# Patient Record
Sex: FEMALE | Race: WHITE | NOT HISPANIC OR LATINO | Employment: FULL TIME | URBAN - NONMETROPOLITAN AREA
[De-identification: names, ages, dates, MRNs, and addresses within clinical notes are randomized per-mention and may not be internally consistent; named-entity substitution may affect disease eponyms.]

---

## 2020-04-26 ENCOUNTER — NURSE TRIAGE (OUTPATIENT)
Dept: CALL CENTER | Facility: HOSPITAL | Age: 40
End: 2020-04-26

## 2020-04-26 NOTE — TELEPHONE ENCOUNTER
"She tested positive for Covid 19- 16 days ago at [facility]. She is having sob, and lost of taste. She is having palpitations. She gets sob if get up talking on the phone and walking, she has to stop and set down. She got winded doing task like sweeping.Her lungs feels weird, she has a bad metal taste in mouth. She states she should of been off isolation Saturday, she does not feel right. Advised to cover face and mouth and go to the ED. Need to continue to isolate. She has not been on any medication for theses symptoms.     Reason for Disposition  • Patient sounds very sick or weak to the triager    Additional Information  • Negative: SEVERE difficulty breathing (e.g., struggling for each breath, speaks in single words)  • Negative: Difficult to awaken or acting confused (e.g., disoriented, slurred speech)  • Negative: Bluish (or gray) lips or face now  • Negative: Shock suspected (e.g., cold/pale/clammy skin, too weak to stand, low BP, rapid pulse)  • Negative: Sounds like a life-threatening emergency to the triager  • Negative: [1] COVID-19 suspected (e.g., cough, fever, shortness of breath) AND [2] public health department recommends testing  • Negative: [1] COVID-19 exposure AND [2] no symptoms  • Negative: COVID-19 and Breastfeeding, questions about  • Negative: SEVERE or constant chest pain (Exception: mild central chest pain, present only when coughing)  • Negative: MODERATE difficulty breathing (e.g., speaks in phrases, SOB even at rest, pulse 100-120)  • Negative: MILD difficulty breathing (e.g., minimal/no SOB at rest, SOB with walking, pulse <100)    Answer Assessment - Initial Assessment Questions  1. COVID-19 DIAGNOSIS: \"Who made your Coronavirus (COVID-19) diagnosis?\" \"Was it confirmed by a positive lab test?\" If not diagnosed by a HCP, ask \"Are there lots of cases (community spread) where you live?\" (See public health department website, if unsure)    * MAJOR community spread: high number of cases; " "numbers of cases are increasing; many people hospitalized.    * MINOR community spread: low number of cases; not increasing; few or no people hospitalized      She tested positive for Covid 19 16 days ago at the [facility].   2. ONSET: \"When did the COVID-19 symptoms start?\"       16 days ago- with fever, cough, and sob, lost of taste and smelll.   3. WORST SYMPTOM: \"What is your worst symptom?\" (e.g., cough, fever, shortness of breath, muscle aches)      Know she is sob, having palpitations, lungs feel strange, and has a bad taste. n  4. COUGH: \"How bad is the cough?\"        n  5. FEVER: \"Do you have a fever?\" If so, ask: \"What is your temperature, how was it measured, and when did it start?\"      n  6. RESPIRATORY STATUS: \"Describe your breathing?\" (e.g., shortness of breath, wheezing, unable to speak)       Sob with adl task, She has to stop and rest.   7. BETTER-SAME-WORSE: \"Are you getting better, staying the same or getting worse compared to yesterday?\"  If getting worse, ask, \"In what way?\"      Worse   8. HIGH RISK DISEASE: \"Do you have any chronic medical problems?\" (e.g., asthma, heart or lung disease, weak immune system, etc.)      n  9. PREGNANCY: \"Is there any chance you are pregnant?\" \"When was your last menstrual period?\"      n  10. OTHER SYMPTOMS: \"Do you have any other symptoms?\"  (e.g., runny nose, headache, sore throat, loss of smell)        Loss of taste.    Protocols used: CORONAVIRUS (COVID-19) DIAGNOSED OR SUSPECTED-ADULT-AH      "

## 2021-12-02 ENCOUNTER — OFFICE VISIT (OUTPATIENT)
Dept: OBSTETRICS AND GYNECOLOGY | Facility: CLINIC | Age: 41
End: 2021-12-02

## 2021-12-02 VITALS
BODY MASS INDEX: 23.55 KG/M2 | SYSTOLIC BLOOD PRESSURE: 132 MMHG | WEIGHT: 128 LBS | HEIGHT: 62 IN | DIASTOLIC BLOOD PRESSURE: 80 MMHG

## 2021-12-02 DIAGNOSIS — N63.0 BREAST LUMP: Primary | ICD-10-CM

## 2021-12-02 PROCEDURE — 99203 OFFICE O/P NEW LOW 30 MIN: CPT | Performed by: NURSE PRACTITIONER

## 2021-12-02 NOTE — PROGRESS NOTES
"Subjective     Aubree Alegria is a 41 y.o. female    Patient comes in today as a new patient.  She has complaints of a breast lump in her left arm.    Breast Mass  This is a new problem. The current episode started in the past 7 days. The problem occurs daily. The problem has been gradually worsening. Pertinent negatives include no abdominal pain, anorexia, arthralgias, change in bowel habit, chest pain, chills, congestion, coughing, diaphoresis, fatigue, fever, headaches, joint swelling, myalgias, nausea, neck pain, numbness, rash, sore throat, swollen glands, urinary symptoms, vertigo, visual change, vomiting or weakness. Nothing aggravates the symptoms. She has tried nothing for the symptoms.         /80   Ht 157.5 cm (62\")   Wt 58.1 kg (128 lb)   LMP 2021 (Approximate)   Breastfeeding No   BMI 23.41 kg/m²     No outpatient encounter medications on file as of 2021.     No facility-administered encounter medications on file as of 2021.       Surgical History  Past Surgical History:   Procedure Laterality Date   • APPENDECTOMY     • BREAST BIOPSY     •  SECTION      x2   • ECTOPIC PREGNANCY     • TUBAL ABDOMINAL LIGATION     • WISDOM TOOTH EXTRACTION         Family History  Family History   Problem Relation Age of Onset   • Kidney cancer Maternal Aunt    • Hypertension Father    • No Known Problems Mother    • No Known Problems Sister    • Leukemia Paternal Grandmother    • Breast cancer Paternal Aunt 40   • Ovarian cancer Neg Hx    • Uterine cancer Neg Hx    • Colon cancer Neg Hx    • Melanoma Neg Hx    • Prostate cancer Neg Hx        The following portions of the patient's history were reviewed and updated as appropriate: allergies, current medications, past family history, past medical history, past social history, past surgical history and problem list.    Review of Systems   Constitutional: Negative for activity change, appetite change, chills, diaphoresis, fatigue, fever, " unexpected weight gain and unexpected weight loss.   HENT: Negative for congestion, dental problem, drooling, ear discharge, ear pain, facial swelling, hearing loss, mouth sores, nosebleeds, postnasal drip, rhinorrhea, sinus pressure, sneezing, sore throat, swollen glands, tinnitus, trouble swallowing and voice change.    Eyes: Negative for blurred vision, double vision, photophobia, pain, discharge, redness, itching and visual disturbance.   Respiratory: Negative for apnea, cough, choking, chest tightness, shortness of breath, wheezing and stridor.    Cardiovascular: Negative for chest pain, palpitations and leg swelling.   Gastrointestinal: Negative for abdominal distention, abdominal pain, anal bleeding, anorexia, blood in stool, change in bowel habit, constipation, diarrhea, nausea, rectal pain, vomiting, GERD and indigestion.   Endocrine: Negative for cold intolerance, heat intolerance, polydipsia, polyphagia and polyuria.   Genitourinary: Positive for breast lump. Negative for amenorrhea, breast discharge, breast pain, decreased libido, decreased urine volume, difficulty urinating, dyspareunia, dysuria, flank pain, frequency, genital sores, hematuria, menstrual problem, pelvic pain, pelvic pressure, urgency, urinary incontinence, vaginal bleeding, vaginal discharge and vaginal pain.   Musculoskeletal: Negative for arthralgias, back pain, gait problem, joint swelling, myalgias, neck pain, neck stiffness and bursitis.   Skin: Negative for color change, dry skin and rash.   Allergic/Immunologic: Negative for environmental allergies, food allergies and immunocompromised state.   Neurological: Negative for dizziness, vertigo, tremors, seizures, syncope, facial asymmetry, speech difficulty, weakness, light-headedness, numbness, headache, memory problem and confusion.   Hematological: Negative for adenopathy. Does not bruise/bleed easily.   Psychiatric/Behavioral: Negative for agitation, behavioral problems,  decreased concentration, dysphoric mood, hallucinations, self-injury, sleep disturbance, suicidal ideas, negative for hyperactivity, depressed mood and stress. The patient is not nervous/anxious.        Objective   Physical Exam  Vitals and nursing note reviewed.   Constitutional:       Appearance: She is well-developed.   HENT:      Head: Normocephalic and atraumatic.   Neck:      Thyroid: No thyromegaly.   Chest:   Breasts: Breasts are symmetrical.      Right: No swelling, bleeding, inverted nipple, mass, nipple discharge, skin change, tenderness, axillary adenopathy or supraclavicular adenopathy.      Left: Mass and tenderness present. No swelling, bleeding, inverted nipple, nipple discharge, skin change, axillary adenopathy or supraclavicular adenopathy.         Musculoskeletal:      Cervical back: Normal range of motion and neck supple.   Lymphadenopathy:      Upper Body:      Right upper body: No supraclavicular, axillary or pectoral adenopathy.      Left upper body: No supraclavicular, axillary or pectoral adenopathy.   Skin:     General: Skin is warm and dry.   Neurological:      Mental Status: She is alert and oriented to person, place, and time.   Psychiatric:         Mood and Affect: Mood normal.         Behavior: Behavior normal.         Thought Content: Thought content normal.         Judgment: Judgment normal.         Assessment/Plan   Diagnoses and all orders for this visit:    1. Breast lump (Primary)  Comments:  Patient has an area in the left breast at approximately 1:00 that is very tender.  I feel a 1 cm firm area.  It has the feel of fibrocystic change.  She will have a mammogram and breast ultrasound at [facility].  She will return here for yearly checkup.  She is encouraged to decrease her caffeine intake.  Orders:  -     Mammo Diagnostic Digital Tomosynthesis Bilateral With CAD; Future  -     US Breast Left Limited; Future         Patient's Body mass index is 23.41 kg/m². indicating that she is  within normal range (BMI 18.5-24.9). No BMI management plan needed..      Michelle Larson, APRN  12/2/2021

## 2021-12-02 NOTE — PATIENT INSTRUCTIONS
"BMI for Adults  What is BMI?  Body mass index (BMI) is a number that is calculated from a person's weight and height. BMI can help estimate how much of a person's weight is composed of fat. BMI does not measure body fat directly. Rather, it is an alternative to procedures that directly measure body fat, which can be difficult and expensive.  BMI can help identify people who may be at higher risk for certain medical problems.  What are BMI measurements used for?  BMI is used as a screening tool to identify possible weight problems. It helps determine whether a person is obese, overweight, a healthy weight, or underweight.  BMI is useful for:  · Identifying a weight problem that may be related to a medical condition or may increase the risk for medical problems.  · Promoting changes, such as changes in diet and exercise, to help reach a healthy weight. BMI screening can be repeated to see if these changes are working.  How is BMI calculated?  BMI involves measuring your weight in relation to your height. Both height and weight are measured, and the BMI is calculated from those numbers. This can be done either in English ([place] or metric measurements. Note that charts and online BMI calculators are available to help you find your BMI quickly and easily without having to do these calculations yourself.  To calculate your BMI in English ([place] measurements:    1. Measure your weight in pounds (lb).  2. Multiply the number of pounds by 703.  ? For example, for a person who weighs 180 lb, multiply that number by 703, which equals 126,540.  3. Measure your height in inches. Then multiply that number by itself to get a measurement called \"inches squared.\"  ? For example, for a person who is 70 inches tall, the \"inches squared\" measurement is 70 inches x 70 inches, which equals 4,900 inches squared.  4. Divide the total from step 2 (number of lb x 703) by the total from step 3 (inches squared): 126,540 ÷ 4,900 = 25.8. This is " "your BMI.    To calculate your BMI in metric measurements:  1. Measure your weight in kilograms (kg).  2. Measure your height in meters (m). Then multiply that number by itself to get a measurement called \"meters squared.\"  ? For example, for a person who is 1.75 m tall, the \"meters squared\" measurement is 1.75 m x 1.75 m, which is equal to 3.1 meters squared.  3. Divide the number of kilograms (your weight) by the meters squared number. In this example: 70 ÷ 3.1 = 22.6. This is your BMI.  What do the results mean?  BMI charts are used to identify whether you are underweight, normal weight, overweight, or obese. The following guidelines will be used:  · Underweight: BMI less than 18.5.  · Normal weight: BMI between 18.5 and 24.9.  · Overweight: BMI between 25 and 29.9.  · Obese: BMI of 30 or above.  Keep these notes in mind:  · Weight includes both fat and muscle, so someone with a muscular build, such as an athlete, may have a BMI that is higher than 24.9. In cases like these, BMI is not an accurate measure of body fat.  · To determine if excess body fat is the cause of a BMI of 25 or higher, further assessments may need to be done by a health care provider.  · BMI is usually interpreted in the same way for men and women.  Where to find more information  For more information about BMI, including tools to quickly calculate your BMI, go to these websites:  · Centers for Disease Control and Prevention: www.cdc.gov  · American Heart Association: www.heart.org  · National Heart, Lung, and Blood [place]: www.nhlbi.nih.gov  Summary  · Body mass index (BMI) is a number that is calculated from a person's weight and height.  · BMI may help estimate how much of a person's weight is composed of fat. BMI can help identify those who may be at higher risk for certain medical problems.  · BMI can be measured using English measurements or metric measurements.  · BMI charts are used to identify whether you are underweight, normal " weight, overweight, or obese.  This information is not intended to replace advice given to you by your health care provider. Make sure you discuss any questions you have with your health care provider.  Document Revised: 09/09/2020 Document Reviewed: 07/17/2020  [employer]vier Patient Education © 2021 [employer]

## 2021-12-13 ENCOUNTER — HOSPITAL ENCOUNTER (OUTPATIENT)
Dept: MAMMOGRAPHY | Facility: HOSPITAL | Age: 41
Discharge: HOME | End: 2021-12-13

## 2021-12-13 ENCOUNTER — APPOINTMENT (OUTPATIENT)
Dept: ULTRASOUND IMAGING | Facility: HOSPITAL | Age: 41
End: 2021-12-13

## 2022-01-05 ENCOUNTER — HOSPITAL ENCOUNTER (OUTPATIENT)
Dept: MAMMOGRAPHY | Facility: HOSPITAL | Age: 42
Discharge: HOME | End: 2022-01-05

## 2022-01-05 ENCOUNTER — HOSPITAL ENCOUNTER (OUTPATIENT)
Dept: ULTRASOUND IMAGING | Facility: HOSPITAL | Age: 42
Discharge: HOME | End: 2022-01-05

## 2022-01-05 DIAGNOSIS — N63.0 BREAST LUMP: ICD-10-CM

## 2022-01-05 PROCEDURE — G0279 TOMOSYNTHESIS, MAMMO: HCPCS

## 2022-01-05 PROCEDURE — 76642 ULTRASOUND BREAST LIMITED: CPT

## 2022-01-05 PROCEDURE — 77066 DX MAMMO INCL CAD BI: CPT

## 2022-01-20 RX ORDER — NITROFURANTOIN 25; 75 MG/1; MG/1
100 CAPSULE ORAL 2 TIMES DAILY
Qty: 10 CAPSULE | Refills: 0 | Status: SHIPPED | OUTPATIENT
Start: 2022-01-20 | End: 2022-01-25

## 2022-01-25 ENCOUNTER — OFFICE VISIT (OUTPATIENT)
Dept: OBSTETRICS AND GYNECOLOGY | Facility: CLINIC | Age: 42
End: 2022-01-25

## 2022-01-25 VITALS
BODY MASS INDEX: 23.55 KG/M2 | DIASTOLIC BLOOD PRESSURE: 82 MMHG | HEIGHT: 62 IN | SYSTOLIC BLOOD PRESSURE: 124 MMHG | WEIGHT: 128 LBS

## 2022-01-25 DIAGNOSIS — Z12.31 ENCOUNTER FOR SCREENING MAMMOGRAM FOR BREAST CANCER: ICD-10-CM

## 2022-01-25 DIAGNOSIS — Z80.3 FAMILY HISTORY OF BREAST CANCER: ICD-10-CM

## 2022-01-25 DIAGNOSIS — N89.8 VAGINAL ITCHING: ICD-10-CM

## 2022-01-25 DIAGNOSIS — Z92.29 COVID-19 VACCINE SERIES COMPLETED: ICD-10-CM

## 2022-01-25 DIAGNOSIS — E55.9 VITAMIN D DEFICIENCY: ICD-10-CM

## 2022-01-25 DIAGNOSIS — N92.0 MENORRHAGIA WITH REGULAR CYCLE: ICD-10-CM

## 2022-01-25 DIAGNOSIS — N89.8 VAGINAL DISCHARGE: ICD-10-CM

## 2022-01-25 DIAGNOSIS — Z01.419 WELL WOMAN EXAM WITH ROUTINE GYNECOLOGICAL EXAM: Primary | ICD-10-CM

## 2022-01-25 PROCEDURE — 87563 M. GENITALIUM AMP PROBE: CPT | Performed by: NURSE PRACTITIONER

## 2022-01-25 PROCEDURE — 87512 GARDNER VAG DNA QUANT: CPT | Performed by: NURSE PRACTITIONER

## 2022-01-25 PROCEDURE — G0123 SCREEN CERV/VAG THIN LAYER: HCPCS | Performed by: NURSE PRACTITIONER

## 2022-01-25 PROCEDURE — 87481 CANDIDA DNA AMP PROBE: CPT | Performed by: NURSE PRACTITIONER

## 2022-01-25 PROCEDURE — 87661 TRICHOMONAS VAGINALIS AMPLIF: CPT | Performed by: NURSE PRACTITIONER

## 2022-01-25 PROCEDURE — 99396 PREV VISIT EST AGE 40-64: CPT | Performed by: NURSE PRACTITIONER

## 2022-01-25 PROCEDURE — 99213 OFFICE O/P EST LOW 20 MIN: CPT | Performed by: NURSE PRACTITIONER

## 2022-01-25 PROCEDURE — 87798 DETECT AGENT NOS DNA AMP: CPT | Performed by: NURSE PRACTITIONER

## 2022-01-25 PROCEDURE — 87624 HPV HI-RISK TYP POOLED RSLT: CPT | Performed by: NURSE PRACTITIONER

## 2022-01-25 NOTE — PATIENT INSTRUCTIONS
Health Maintenance, Female  Adopting a healthy lifestyle and getting preventive care are important in promoting health and wellness. Ask your health care provider about:  · The right schedule for you to have regular tests and exams.  · Things you can do on your own to prevent diseases and keep yourself healthy.  What should I know about diet, weight, and exercise?  Eat a healthy diet    · Eat a diet that includes plenty of vegetables, fruits, low-fat dairy products, and lean protein.  · Do not eat a lot of foods that are high in solid fats, added sugars, or sodium.    Maintain a healthy weight  Body mass index (BMI) is used to identify weight problems. It estimates body fat based on height and weight. Your health care provider can help determine your BMI and help you achieve or maintain a healthy weight.  Get regular exercise  Get regular exercise. This is one of the most important things you can do for your health. Most adults should:  · Exercise for at least 150 minutes each week. The exercise should increase your heart rate and make you sweat (moderate-intensity exercise).  · Do strengthening exercises at least twice a week. This is in addition to the moderate-intensity exercise.  · Spend less time sitting. Even light physical activity can be beneficial.  · Start  Vitamin D 5000IU per day. Vitamin D has been shown to improve your mood, help with fatigue and increase your immune system. A normal Vitamin D in women should be 50-70.  You should have your Vitamin D checked yearly  Watch cholesterol and blood lipids  Have your blood tested for lipids and cholesterol at 20 years of age, then have this test every 3 years.  Have your cholesterol levels checked more often if:  · Your lipid or cholesterol levels are high.  · You are older than 30 years of age.  · You are at high risk for heart disease.  What should I know about cancer screening?  Depending on your health history and family history, you may need to have  cancer screening at various ages. This may include screening for:  · Breast cancer.  · Cervical cancer.  · Colorectal cancer.  · Skin cancer.  · Lung cancer.  What should I know about heart disease, diabetes, and high blood pressure?  Blood pressure and heart disease  1. High blood pressure causes heart disease and increases the risk of stroke. This is more likely to develop in people who have high blood pressure readings, are of  descent, or are overweight.  2. Have your blood pressure checked:                  Every year.  Diabetes  Have regular diabetes screenings. This checks your fasting blood sugar level. Have the screening done:  · Once every year after age 30 if you are at a normal weight and have a low risk for diabetes.  · More often and at a younger age if you are overweight or have a high risk for diabetes.  What should I know about preventing infection?  Hepatitis B  If you have a higher risk for hepatitis B, you should be screened for this virus. Talk with your health care provider to find out if you are at risk for hepatitis B infection.  Hepatitis C  Testing is recommended for:  · Everyone born from 1945 through 1965.  · Anyone with known risk factors for hepatitis C.  Sexually transmitted infections (STIs)  1. Get screened for STIs, including gonorrhea and chlamydia, if:  ? You are sexually active and are younger than 24 years of age.  ? You are older than 24 years of age and your health care provider tells you that you are at risk for this type of infection.  ? Your sexual activity has changed since you were last screened, and you are at increased risk for chlamydia or gonorrhea. Ask your health care provider if you are at risk.  2. Ask your health care provider about whether you are at high risk for HIV. Your health care provider may recommend a prescription medicine to help prevent HIV infection. If you choose to take medicine to prevent HIV, you should first get tested for HIV. You should  then be tested every 3 months for as long as you are taking the medicine.  Pregnancy  · If you are about to stop having your period (premenopausal) and you may become pregnant, seek counseling before you get pregnant.  · Take 400 to 800 micrograms (mcg) of folic acid every day if you become pregnant.  · Ask for birth control (contraception) if you want to prevent pregnancy.  Osteoporosis and menopause  Osteoporosis is a disease in which the bones lose minerals and strength with aging. This can result in bone fractures. If you are 55 years old or older, or if you are at risk for osteoporosis and fractures, ask your health care provider if you should:  · Be screened for bone loss.  · Take a calcium or vitamin D supplement to lower your risk of fractures.  · Be given hormone replacement therapy (HRT) to treat symptoms of menopause.  Follow these instructions at home:  Lifestyle  · Do not use any products that contain nicotine or tobacco, such as cigarettes, e-cigarettes, and chewing tobacco. If you need help quitting, ask your health care provider.  · Do not use street drugs.  · Do not share needles.  · Ask your health care provider for help if you need support or information about quitting drugs.  Alcohol use  1. Do not drink alcohol if:  ? Your health care provider tells you not to drink.  ? You are pregnant, may be pregnant, or are planning to become pregnant.  2. If you drink alcohol:  ? Limit how much you use to 0-1 drink a day.  ? Limit intake if you are breastfeeding.  3. Be aware of how much alcohol is in your drink. In the [place] one drink equals one 12 oz bottle of beer (355 mL), one 5 oz glass of wine (148 mL), or one 1½ oz glass of hard liquor (44 mL).  General instructions  1. Schedule regular health, dental, and eye exams.  2. Stay current with your vaccines.  3. Tell your health care provider if:  ? You often feel depressed.  ? You have ever been abused or do not feel safe at home.  Summary  · Adopting a  healthy lifestyle and getting preventive care are important in promoting health and wellness.  · Follow your health care provider's instructions about healthy diet, exercising, and getting tested or screened for diseases.  · Follow your health care provider's instructions on monitoring your cholesterol and blood pressure.  This information is not intended to replace advice given to you by your health care provider. Make sure you discuss any questions you have with your health care provider.  Document Revised: 12/11/2019 Document Reviewed: 12/11/2019  [employer] Patient Education © 2021 [employer]      BMI for Adults  What is BMI?  Body mass index (BMI) is a number that is calculated from a person's weight and height. BMI can help estimate how much of a person's weight is composed of fat. BMI does not measure body fat directly. Rather, it is an alternative to procedures that directly measure body fat, which can be difficult and expensive.  BMI can help identify people who may be at higher risk for certain medical problems.  What are BMI measurements used for?  BMI is used as a screening tool to identify possible weight problems. It helps determine whether a person is obese, overweight, a healthy weight, or underweight.  BMI is useful for:  · Identifying a weight problem that may be related to a medical condition or may increase the risk for medical problems.  · Promoting changes, such as changes in diet and exercise, to help reach a healthy weight. BMI screening can be repeated to see if these changes are working.  How is BMI calculated?  BMI involves measuring your weight in relation to your height. Both height and weight are measured, and the BMI is calculated from those numbers. This can be done either in English ([place] or metric measurements. Note that charts and online BMI calculators are available to help you find your BMI quickly and easily without having to do these calculations yourself.  To calculate your  "BMI in English ([place] measurements:    1. Measure your weight in pounds (lb).  2. Multiply the number of pounds by 703.  ? For example, for a person who weighs 180 lb, multiply that number by 703, which equals 126,540.  3. Measure your height in inches. Then multiply that number by itself to get a measurement called \"inches squared.\"  ? For example, for a person who is 70 inches tall, the \"inches squared\" measurement is 70 inches x 70 inches, which equals 4,900 inches squared.  4. Divide the total from step 2 (number of lb x 703) by the total from step 3 (inches squared): 126,540 ÷ 4,900 = 25.8. This is your BMI.    To calculate your BMI in metric measurements:  1. Measure your weight in kilograms (kg).  2. Measure your height in meters (m). Then multiply that number by itself to get a measurement called \"meters squared.\"  ? For example, for a person who is 1.75 m tall, the \"meters squared\" measurement is 1.75 m x 1.75 m, which is equal to 3.1 meters squared.  3. Divide the number of kilograms (your weight) by the meters squared number. In this example: 70 ÷ 3.1 = 22.6. This is your BMI.  What do the results mean?  BMI charts are used to identify whether you are underweight, normal weight, overweight, or obese. The following guidelines will be used:  · Underweight: BMI less than 18.5.  · Normal weight: BMI between 18.5 and 24.9.  · Overweight: BMI between 25 and 29.9.  · Obese: BMI of 30 or above.  Keep these notes in mind:  · Weight includes both fat and muscle, so someone with a muscular build, such as an athlete, may have a BMI that is higher than 24.9. In cases like these, BMI is not an accurate measure of body fat.  · To determine if excess body fat is the cause of a BMI of 25 or higher, further assessments may need to be done by a health care provider.  · BMI is usually interpreted in the same way for men and women.  Where to find more information  For more information about BMI, including tools to quickly " calculate your BMI, go to these websites:  · Centers for Disease Control and Prevention: www.cdc.gov  · American Heart Association: www.heart.org  · National Heart, Lung, and Blood [place]: www.nhlbi.nih.gov  Summary  · Body mass index (BMI) is a number that is calculated from a person's weight and height.  · BMI may help estimate how much of a person's weight is composed of fat. BMI can help identify those who may be at higher risk for certain medical problems.  · BMI can be measured using English measurements or metric measurements.  · BMI charts are used to identify whether you are underweight, normal weight, overweight, or obese.  This information is not intended to replace advice given to you by your health care provider. Make sure you discuss any questions you have with your health care provider.  Document Revised: 09/09/2020 Document Reviewed: 07/17/2020  [employer] Patient Education © 2021 [employer]

## 2022-01-25 NOTE — PROGRESS NOTES
"Subjective     Aubree Alegria is a 41 y.o. female    Patient is here today for yearly checkup. She has complaints of vaginal itching. She also has complaints of very heavy periods that have gradually gotten worse. She is passing large clots and states her periods last the full 7 days.    Gynecologic Exam  The patient's primary symptoms include genital itching. The patient's pertinent negatives include no pelvic pain or vaginal discharge. This is a new problem. The current episode started more than 1 month ago. The problem occurs intermittently. The problem has been waxing and waning. The patient is experiencing no pain. Pertinent negatives include no abdominal pain, anorexia, back pain, chills, constipation, diarrhea, discolored urine, dysuria, fever, flank pain, frequency, headaches, hematuria, joint pain, joint swelling, nausea, painful intercourse, rash, sore throat, urgency or vomiting. She is sexually active. She uses nothing for contraception. Her menstrual history has been regular.         /82   Ht 157.5 cm (62.01\")   Wt 58.1 kg (128 lb)   LMP 2022 (Approximate)   Breastfeeding No   BMI 23.41 kg/m²     Outpatient Encounter Medications as of 2022   Medication Sig Dispense Refill   • nystatin-triamcinolone (MYCOLOG II) 281847-2.1 UNIT/GM-% cream Apply 1 application topically to the appropriate area as directed 2 (Two) Times a Day. 45 g 3   • [DISCONTINUED] nitrofurantoin, macrocrystal-monohydrate, (Macrobid) 100 MG capsule Take 1 capsule by mouth 2 (Two) Times a Day for 5 days. 10 capsule 0     No facility-administered encounter medications on file as of 2022.       Surgical History  Past Surgical History:   Procedure Laterality Date   • APPENDECTOMY     • BREAST BIOPSY Bilateral     benign   •  SECTION      x2   • ECTOPIC PREGNANCY     • TUBAL ABDOMINAL LIGATION     • WISDOM TOOTH EXTRACTION         Family History  Family History   Problem Relation Age of Onset   • Kidney " cancer Maternal Aunt    • Hypertension Father    • No Known Problems Mother    • No Known Problems Sister    • Leukemia Paternal Grandmother    • Breast cancer Paternal Aunt 40   • Ovarian cancer Neg Hx    • Uterine cancer Neg Hx    • Colon cancer Neg Hx    • Melanoma Neg Hx    • Prostate cancer Neg Hx        The following portions of the patient's history were reviewed and updated as appropriate: allergies, current medications, past family history, past medical history, past social history, past surgical history, and problem list.    Review of Systems   Constitutional: Negative for activity change, appetite change, chills, diaphoresis, fatigue, fever, unexpected weight gain and unexpected weight loss.   HENT: Negative for congestion, dental problem, drooling, ear discharge, ear pain, facial swelling, hearing loss, mouth sores, nosebleeds, postnasal drip, rhinorrhea, sinus pressure, sneezing, sore throat, swollen glands, tinnitus, trouble swallowing and voice change.    Eyes: Negative for blurred vision, double vision, photophobia, pain, discharge, redness, itching and visual disturbance.   Respiratory: Negative for apnea, cough, choking, chest tightness, shortness of breath, wheezing and stridor.    Cardiovascular: Negative for chest pain, palpitations and leg swelling.   Gastrointestinal: Negative for abdominal distention, abdominal pain, anal bleeding, anorexia, blood in stool, constipation, diarrhea, nausea, rectal pain, vomiting, GERD and indigestion.   Endocrine: Negative for cold intolerance, heat intolerance, polydipsia, polyphagia and polyuria.   Genitourinary: Positive for menstrual problem. Negative for amenorrhea, breast discharge, breast lump, breast pain, decreased libido, decreased urine volume, difficulty urinating, dyspareunia, dysuria, flank pain, frequency, genital sores, hematuria, pelvic pain, pelvic pressure, urgency, urinary incontinence, vaginal bleeding, vaginal discharge and vaginal pain.    Musculoskeletal: Negative for arthralgias, back pain, gait problem, joint pain, joint swelling, myalgias, neck pain, neck stiffness and bursitis.   Skin: Negative for color change, dry skin and rash.   Allergic/Immunologic: Negative for environmental allergies, food allergies and immunocompromised state.   Neurological: Negative for dizziness, tremors, seizures, syncope, facial asymmetry, speech difficulty, weakness, light-headedness, numbness, headache, memory problem and confusion.   Hematological: Negative for adenopathy. Does not bruise/bleed easily.   Psychiatric/Behavioral: Negative for agitation, behavioral problems, decreased concentration, dysphoric mood, hallucinations, self-injury, sleep disturbance, suicidal ideas, negative for hyperactivity, depressed mood and stress. The patient is not nervous/anxious.        Objective   Physical Exam  Vitals and nursing note reviewed. Exam conducted with a chaperone present.   Constitutional:       General: She is not in acute distress.     Appearance: She is well-developed. She is not diaphoretic.   HENT:      Head: Normocephalic.      Right Ear: External ear normal.      Left Ear: External ear normal.      Nose: Nose normal.   Eyes:      General: No scleral icterus.        Right eye: No discharge.         Left eye: No discharge.      Conjunctiva/sclera: Conjunctivae normal.      Pupils: Pupils are equal, round, and reactive to light.   Neck:      Thyroid: No thyromegaly.      Vascular: No carotid bruit.      Trachea: No tracheal deviation.   Cardiovascular:      Rate and Rhythm: Normal rate and regular rhythm.      Heart sounds: Normal heart sounds. No murmur heard.      Pulmonary:      Effort: Pulmonary effort is normal. No respiratory distress.      Breath sounds: Normal breath sounds. No wheezing.   Chest:   Breasts: Breasts are symmetrical.      Right: Normal. No swelling, bleeding, inverted nipple, mass, nipple discharge, skin change, tenderness, axillary  adenopathy or supraclavicular adenopathy.      Left: Normal. No swelling, bleeding, inverted nipple, mass, nipple discharge, skin change, tenderness, axillary adenopathy or supraclavicular adenopathy.       Abdominal:      General: There is no distension.      Palpations: Abdomen is soft. There is no mass.      Tenderness: There is no abdominal tenderness. There is no right CVA tenderness, left CVA tenderness or guarding.      Hernia: No hernia is present. There is no hernia in the left inguinal area or right inguinal area.   Genitourinary:     General: Normal vulva.      Exam position: Lithotomy position.      Labia:         Right: No rash, tenderness, lesion or injury.         Left: No rash, tenderness, lesion or injury.       Vagina: No signs of injury and foreign body. Vaginal discharge present. No erythema, tenderness or bleeding.      Cervix: Normal.      Uterus: Normal. Enlarged. Not fixed and not tender.       Adnexa: Right adnexa normal and left adnexa normal.        Right: No mass, tenderness or fullness.          Left: No mass, tenderness or fullness.        Rectum: Normal. No mass.      Comments:   BSU normal  Urethral meatus  Normal  Perineum  Normal  Musculoskeletal:         General: No tenderness. Normal range of motion.      Cervical back: Normal range of motion and neck supple.   Lymphadenopathy:      Head:      Right side of head: No submental, submandibular, tonsillar, preauricular, posterior auricular or occipital adenopathy.      Left side of head: No submental, submandibular, tonsillar, preauricular, posterior auricular or occipital adenopathy.      Cervical: No cervical adenopathy.      Right cervical: No superficial, deep or posterior cervical adenopathy.     Left cervical: No superficial, deep or posterior cervical adenopathy.      Upper Body:      Right upper body: No supraclavicular, axillary or pectoral adenopathy.      Left upper body: No supraclavicular, axillary or pectoral adenopathy.       Lower Body: No right inguinal adenopathy. No left inguinal adenopathy.   Skin:     General: Skin is warm and dry.      Findings: No bruising, erythema or rash.   Neurological:      Mental Status: She is alert and oriented to person, place, and time.      Coordination: Coordination normal.   Psychiatric:         Mood and Affect: Mood normal.         Behavior: Behavior normal.         Thought Content: Thought content normal.         Judgment: Judgment normal.         Assessment/Plan   Diagnoses and all orders for this visit:    1. Well woman exam with routine gynecological exam (Primary)  Normal GYN exam. Will RTO for lab work. Encouraged SBE, pt is aware how to do self breast exam and the importance of same. Discussed weight management and importance of maintaining a healthy weight. Discussed Vitamin D intake and the importance of adequate vitamin D for both Bone Health and a healthy immune system.  Discussed Daily exercise and the importance of same, in regards to a healthy heart as well as helping to maintain her weight and improving her mental health.  BMI 23.4. Mammogram already done. Pap smear is done per [facility] guidelines.  -     Liquid-based Pap Smear, Screening  -     CBC & Differential  -     Comprehensive Metabolic Panel  -     Lipid Panel With LDL / HDL Ratio  -     TSH  -     T3, Uptake  -     T4, Free  -     Hemoglobin A1c  -     Urine Culture - , Urine, Clean Catch  -     UA / M With / Rflx Culture([facility] ONLY) - Urine, Clean Catch  -     Hepatitis C Antibody    2. Encounter for screening mammogram for breast cancer  Comments:  Patient recently had a normal mammogram. She had a small cyst in the left breast.    3. COVID-19 vaccine series completed  Comments:  Patient has received her Covid vaccine.    4. Family history of breast cancer  Comments:  Patient has family history of breast cancer. We discussed genetic screening. She does not wish to proceed at this point.    5. Menorrhagia with regular  cycle  Comments:  Patient is having very heavy periods with clotting and cramping. States she passed a clot as large as her fist. She states her periods last a full 7 to 8 days. She bleeds heavy for most of that time. We discussed birth control pills but she has a history of migraines and does not really want to start those. We also discussed an IUD. We will go into further detail pending her ultrasound results. She will return for pelvic ultrasound.    6. Vaginal itching  Comments:  Patient complains of itching during her. More toward the clitoral area. No evidence of lichens. She is given nystatin cream to use as needed  Orders:  -     nystatin-triamcinolone (MYCOLOG II) 039677-3.1 UNIT/GM-% cream; Apply 1 application topically to the appropriate area as directed 2 (Two) Times a Day.  Dispense: 45 g; Refill: 3    7. Vaginal discharge  Comments:  Patient has a moderate amount of white discharge. BV panel was added to Pap smear.  Orders:  -     Liquid-based Pap Smear, Screening    8. Vitamin D deficiency  Comments:  Patient will return for blood work.  Orders:  -     Vitamin D 25 Hydroxy         Patient's Body mass index is 23.41 kg/m². indicating that she is within normal range (BMI 18.5-24.9). No BMI management plan needed..      Michelle Larson, APRN  1/25/2022

## 2022-01-27 LAB — HPV I/H RISK 4 DNA CVX QL PROBE+SIG AMP: NOT DETECTED

## 2022-01-29 LAB
25(OH)D3+25(OH)D2 SERPL-MCNC: 22.2 NG/ML (ref 30–100)
ALBUMIN SERPL-MCNC: 4.1 G/DL (ref 3.5–5.2)
ALBUMIN/GLOB SERPL: 1.6 G/DL
ALP SERPL-CCNC: 47 U/L (ref 39–117)
ALT SERPL-CCNC: 9 U/L (ref 1–33)
APPEARANCE UR: ABNORMAL
AST SERPL-CCNC: 13 U/L (ref 1–32)
BACTERIA #/AREA URNS HPF: ABNORMAL /HPF
BACTERIA UR CULT: NO GROWTH
BACTERIA UR CULT: NORMAL
BASOPHILS # BLD AUTO: 0.05 10*3/MM3 (ref 0–0.2)
BASOPHILS NFR BLD AUTO: 1 % (ref 0–1.5)
BILIRUB SERPL-MCNC: 0.5 MG/DL (ref 0–1.2)
BILIRUB UR QL STRIP: NEGATIVE
BUN SERPL-MCNC: 9 MG/DL (ref 6–20)
BUN/CREAT SERPL: 11.5 (ref 7–25)
CALCIUM SERPL-MCNC: 8.9 MG/DL (ref 8.6–10.5)
CASTS URNS QL MICRO: ABNORMAL /LPF
CHLORIDE SERPL-SCNC: 105 MMOL/L (ref 98–107)
CHOLEST SERPL-MCNC: 155 MG/DL (ref 0–200)
CO2 SERPL-SCNC: 23.1 MMOL/L (ref 22–29)
COLOR UR: YELLOW
CREAT SERPL-MCNC: 0.78 MG/DL (ref 0.57–1)
EOSINOPHIL # BLD AUTO: 0.08 10*3/MM3 (ref 0–0.4)
EOSINOPHIL NFR BLD AUTO: 1.6 % (ref 0.3–6.2)
EPI CELLS #/AREA URNS HPF: ABNORMAL /HPF (ref 0–10)
ERYTHROCYTE [DISTWIDTH] IN BLOOD BY AUTOMATED COUNT: 13.1 % (ref 12.3–15.4)
GLOBULIN SER CALC-MCNC: 2.5 GM/DL
GLUCOSE SERPL-MCNC: 95 MG/DL (ref 65–99)
GLUCOSE UR QL STRIP: NEGATIVE
HBA1C MFR BLD: 5.6 % (ref 4.8–5.6)
HCT VFR BLD AUTO: 39.5 % (ref 34–46.6)
HCV AB S/CO SERPL IA: <0.1 S/CO RATIO (ref 0–0.9)
HDLC SERPL-MCNC: 50 MG/DL (ref 40–60)
HGB BLD-MCNC: 12.8 G/DL (ref 12–15.9)
HGB UR QL STRIP: ABNORMAL
IMM GRANULOCYTES # BLD AUTO: 0.01 10*3/MM3 (ref 0–0.05)
IMM GRANULOCYTES NFR BLD AUTO: 0.2 % (ref 0–0.5)
KETONES UR QL STRIP: NEGATIVE
LDLC SERPL CALC-MCNC: 89 MG/DL (ref 0–100)
LDLC/HDLC SERPL: 1.75 {RATIO}
LEUKOCYTE ESTERASE UR QL STRIP: NEGATIVE
LYMPHOCYTES # BLD AUTO: 2.22 10*3/MM3 (ref 0.7–3.1)
LYMPHOCYTES NFR BLD AUTO: 44.9 % (ref 19.6–45.3)
MCH RBC QN AUTO: 26.2 PG (ref 26.6–33)
MCHC RBC AUTO-ENTMCNC: 32.4 G/DL (ref 31.5–35.7)
MCV RBC AUTO: 80.9 FL (ref 79–97)
MICRO URNS: ABNORMAL
MONOCYTES # BLD AUTO: 0.33 10*3/MM3 (ref 0.1–0.9)
MONOCYTES NFR BLD AUTO: 6.7 % (ref 5–12)
NEUTROPHILS # BLD AUTO: 2.25 10*3/MM3 (ref 1.7–7)
NEUTROPHILS NFR BLD AUTO: 45.6 % (ref 42.7–76)
NITRITE UR QL STRIP: NEGATIVE
NRBC BLD AUTO-RTO: 0 /100 WBC (ref 0–0.2)
PH UR STRIP: 5.5 [PH] (ref 5–7.5)
PLATELET # BLD AUTO: 295 10*3/MM3 (ref 140–450)
POTASSIUM SERPL-SCNC: 4.2 MMOL/L (ref 3.5–5.2)
PROT SERPL-MCNC: 6.6 G/DL (ref 6–8.5)
PROT UR QL STRIP: NEGATIVE
RBC # BLD AUTO: 4.88 10*6/MM3 (ref 3.77–5.28)
RBC #/AREA URNS HPF: ABNORMAL /HPF (ref 0–2)
SODIUM SERPL-SCNC: 140 MMOL/L (ref 136–145)
SP GR UR STRIP: 1.03 (ref 1–1.03)
T3RU NFR SERPL: 24 % (ref 24–39)
T4 FREE SERPL-MCNC: 1.01 NG/DL (ref 0.93–1.7)
TRIGL SERPL-MCNC: 87 MG/DL (ref 0–150)
TSH SERPL-ACNC: 2.33 UIU/ML (ref 0.27–4.2)
URINALYSIS REFLEX: ABNORMAL
UROBILINOGEN UR STRIP-MCNC: 0.2 MG/DL (ref 0.2–1)
VLDLC SERPL CALC-MCNC: 16 MG/DL (ref 5–40)
WBC # BLD AUTO: 4.94 10*3/MM3 (ref 3.4–10.8)
WBC #/AREA URNS HPF: ABNORMAL /HPF (ref 0–5)

## 2022-01-31 LAB
GEN CATEG CVX/VAG CYTO-IMP: NORMAL
LAB AP CASE REPORT: NORMAL
LAB AP GYN ADDITIONAL INFORMATION: NORMAL
LAB AP GYN OTHER FINDINGS: NORMAL
PATH INTERP SPEC-IMP: NORMAL
STAT OF ADQ CVX/VAG CYTO-IMP: NORMAL
TRICHOMONAS VAGINALIS PCR: NOT DETECTED

## 2022-02-01 RX ORDER — ERGOCALCIFEROL 1.25 MG/1
50000 CAPSULE ORAL 2 TIMES WEEKLY
Qty: 10 CAPSULE | Refills: 3 | Status: SHIPPED | OUTPATIENT
Start: 2022-02-03

## 2023-10-31 ENCOUNTER — OFFICE VISIT (OUTPATIENT)
Dept: OBSTETRICS AND GYNECOLOGY | Facility: CLINIC | Age: 43
End: 2023-10-31
Payer: COMMERCIAL

## 2023-10-31 VITALS
SYSTOLIC BLOOD PRESSURE: 122 MMHG | DIASTOLIC BLOOD PRESSURE: 80 MMHG | HEIGHT: 62 IN | BODY MASS INDEX: 22.82 KG/M2 | WEIGHT: 124 LBS

## 2023-10-31 DIAGNOSIS — N92.0 MENORRHAGIA WITH REGULAR CYCLE: ICD-10-CM

## 2023-10-31 DIAGNOSIS — N89.8 VAGINAL DISCHARGE: ICD-10-CM

## 2023-10-31 DIAGNOSIS — Z80.3 FAMILY HISTORY OF BREAST CANCER: ICD-10-CM

## 2023-10-31 DIAGNOSIS — E55.9 VITAMIN D DEFICIENCY: ICD-10-CM

## 2023-10-31 DIAGNOSIS — Z12.31 ENCOUNTER FOR SCREENING MAMMOGRAM FOR BREAST CANCER: ICD-10-CM

## 2023-10-31 DIAGNOSIS — Z01.419 WELL WOMAN EXAM WITH ROUTINE GYNECOLOGICAL EXAM: Primary | ICD-10-CM

## 2023-10-31 PROCEDURE — 87624 HPV HI-RISK TYP POOLED RSLT: CPT | Performed by: NURSE PRACTITIONER

## 2023-10-31 PROCEDURE — 87661 TRICHOMONAS VAGINALIS AMPLIF: CPT | Performed by: NURSE PRACTITIONER

## 2023-10-31 NOTE — PROGRESS NOTES
"Subjective     Aubree Alegria is a 43 y.o. female    History of Present Illness  Patient comes in today for checkup.  She continues to have heavy periods  Gynecologic Exam  The patient's primary symptoms include vaginal bleeding. The patient's pertinent negatives include no pelvic pain or vaginal discharge. The patient is experiencing no pain. Pertinent negatives include no abdominal pain, anorexia, back pain, chills, constipation, diarrhea, discolored urine, dysuria, fever, flank pain, frequency, headaches, hematuria, joint pain, joint swelling, nausea, painful intercourse, rash, sore throat, urgency or vomiting. The vaginal discharge was bloody. The vaginal bleeding is heavier than menses. She has been passing clots. She has not been passing tissue. She is sexually active. She uses tubal ligation for contraception. Her menstrual history has been regular.         /80   Ht 157.5 cm (62\")   Wt 56.2 kg (124 lb)   LMP 10/25/2023   BMI 22.68 kg/m²     Outpatient Encounter Medications as of 10/31/2023   Medication Sig Dispense Refill    [DISCONTINUED] nystatin-triamcinolone (MYCOLOG II) 547212-8.1 UNIT/GM-% cream Apply 1 application topically to the appropriate area as directed 2 (Two) Times a Day. 45 g 3    [DISCONTINUED] vitamin D (ERGOCALCIFEROL) 1.25 MG (30845 UT) capsule capsule Take 1 capsule by mouth 2 (Two) Times a Week. 10 capsule 3     No facility-administered encounter medications on file as of 10/31/2023.       Past Medical History  History reviewed. No pertinent past medical history.    Surgical History  Past Surgical History:   Procedure Laterality Date    APPENDECTOMY      BREAST BIOPSY Bilateral     benign     SECTION      x2    ECTOPIC PREGNANCY      TUBAL ABDOMINAL LIGATION      WISDOM TOOTH EXTRACTION         Family History  Family History   Problem Relation Age of Onset    Kidney cancer Maternal Aunt     Hypertension Father     No Known Problems Mother     No Known Problems Sister  "    Leukemia Paternal Grandmother     Breast cancer Paternal Aunt 40    Ovarian cancer Neg Hx     Uterine cancer Neg Hx     Colon cancer Neg Hx     Melanoma Neg Hx     Prostate cancer Neg Hx        The following portions of the patient's history were reviewed and updated as appropriate: allergies, current medications, past family history, past medical history, past social history, past surgical history, and problem list.    Review of Systems   Constitutional:  Negative for activity change, appetite change, chills, diaphoresis, fatigue, fever, unexpected weight gain and unexpected weight loss.   HENT:  Negative for congestion, dental problem, drooling, ear discharge, ear pain, facial swelling, hearing loss, mouth sores, nosebleeds, postnasal drip, rhinorrhea, sinus pressure, sneezing, sore throat, swollen glands, tinnitus, trouble swallowing and voice change.    Eyes:  Negative for blurred vision, double vision, photophobia, pain, discharge, redness, itching and visual disturbance.   Respiratory:  Negative for apnea, cough, choking, chest tightness, shortness of breath, wheezing and stridor.    Cardiovascular:  Negative for chest pain, palpitations and leg swelling.   Gastrointestinal:  Negative for abdominal distention, abdominal pain, anal bleeding, anorexia, blood in stool, constipation, diarrhea, nausea, rectal pain, vomiting, GERD and indigestion.   Endocrine: Negative for cold intolerance, heat intolerance, polydipsia, polyphagia and polyuria.   Genitourinary:  Positive for menstrual problem. Negative for amenorrhea, breast discharge, breast lump, breast pain, decreased libido, decreased urine volume, difficulty urinating, dyspareunia, dysuria, flank pain, frequency, genital sores, hematuria, pelvic pain, pelvic pressure, urgency, urinary incontinence, vaginal bleeding, vaginal discharge and vaginal pain.   Musculoskeletal:  Negative for arthralgias, back pain, gait problem, joint pain, joint swelling, myalgias,  neck pain, neck stiffness and bursitis.   Skin:  Negative for color change, dry skin and rash.   Allergic/Immunologic: Negative for environmental allergies, food allergies and immunocompromised state.   Neurological:  Negative for dizziness, tremors, seizures, syncope, facial asymmetry, speech difficulty, weakness, light-headedness, numbness, headache, memory problem and confusion.   Hematological:  Negative for adenopathy. Does not bruise/bleed easily.   Psychiatric/Behavioral:  Negative for agitation, behavioral problems, decreased concentration, dysphoric mood, hallucinations, self-injury, sleep disturbance, suicidal ideas, negative for hyperactivity, depressed mood and stress. The patient is not nervous/anxious.        Objective   Physical Exam  Vitals and nursing note reviewed. Exam conducted with a chaperone present.   Constitutional:       General: She is not in acute distress.     Appearance: She is well-developed. She is not diaphoretic.   HENT:      Head: Normocephalic.      Right Ear: External ear normal.      Left Ear: External ear normal.      Nose: Nose normal.   Eyes:      General: No scleral icterus.        Right eye: No discharge.         Left eye: No discharge.      Conjunctiva/sclera: Conjunctivae normal.      Pupils: Pupils are equal, round, and reactive to light.   Neck:      Thyroid: No thyromegaly.      Vascular: No carotid bruit.      Trachea: No tracheal deviation.   Cardiovascular:      Rate and Rhythm: Normal rate and regular rhythm.      Heart sounds: Normal heart sounds. No murmur heard.  Pulmonary:      Effort: Pulmonary effort is normal. No respiratory distress.      Breath sounds: Normal breath sounds. No wheezing.   Chest:   Breasts:     Breasts are symmetrical.      Right: Normal. No swelling, bleeding, inverted nipple, mass, nipple discharge, skin change or tenderness.      Left: Normal. No swelling, bleeding, inverted nipple, mass, nipple discharge, skin change or tenderness.    Abdominal:      General: There is no distension.      Palpations: Abdomen is soft. There is no mass.      Tenderness: There is no abdominal tenderness. There is no right CVA tenderness, left CVA tenderness or guarding.      Hernia: No hernia is present. There is no hernia in the left inguinal area or right inguinal area.   Genitourinary:     General: Normal vulva.      Exam position: Lithotomy position.      Labia:         Right: No rash, tenderness, lesion or injury.         Left: No rash, tenderness, lesion or injury.       Vagina: Normal. No signs of injury and foreign body. No vaginal discharge, erythema, tenderness or bleeding.      Cervix: Normal.      Uterus: Normal. Not enlarged, not fixed and not tender.       Adnexa: Right adnexa normal and left adnexa normal.        Right: No mass, tenderness or fullness.          Left: No mass, tenderness or fullness.        Rectum: Normal. No mass.      Comments:   BSU normal  Urethral meatus  Normal  Perineum  Normal  Musculoskeletal:         General: No tenderness. Normal range of motion.      Cervical back: Normal range of motion and neck supple.   Lymphadenopathy:      Head:      Right side of head: No submental, submandibular, tonsillar, preauricular, posterior auricular or occipital adenopathy.      Left side of head: No submental, submandibular, tonsillar, preauricular, posterior auricular or occipital adenopathy.      Cervical: No cervical adenopathy.      Right cervical: No superficial, deep or posterior cervical adenopathy.     Left cervical: No superficial, deep or posterior cervical adenopathy.      Upper Body:      Right upper body: No supraclavicular, axillary or pectoral adenopathy.      Left upper body: No supraclavicular, axillary or pectoral adenopathy.      Lower Body: No right inguinal adenopathy. No left inguinal adenopathy.   Skin:     General: Skin is warm and dry.      Findings: No bruising, erythema or rash.   Neurological:      Mental Status:  She is alert and oriented to person, place, and time.      Coordination: Coordination normal.   Psychiatric:         Mood and Affect: Mood normal.         Behavior: Behavior normal.         Thought Content: Thought content normal.         Judgment: Judgment normal.         PHQ-9 Depression Screening  Little interest or pleasure in doing things? 0-->not at all   Feeling down, depressed, or hopeless? 0-->not at all   Trouble falling or staying asleep, or sleeping too much?     Feeling tired or having little energy?     Poor appetite or overeating?     Feeling bad about yourself - or that you are a failure or have let yourself or your family down?     Trouble concentrating on things, such as reading the newspaper or watching television?     Moving or speaking so slowly that other people could have noticed? Or the opposite - being so fidgety or restless that you have been moving around a lot more than usual?     Thoughts that you would be better off dead, or of hurting yourself in some way?     PHQ-9 Total Score 0   If you checked off any problems, how difficult have these problems made it for you to do your work, take care of things at home, or get along with other people?          Assessment & Plan   Diagnoses and all orders for this visit:    1. Well woman exam with routine gynecological exam (Primary)  Normal GYN exam. Will have lab work. Encouraged SBE, pt is aware how to do self breast exam and the importance of same. Discussed weight management and importance of maintaining a healthy weight. Discussed Vitamin D intake and the importance of adequate vitamin D for both Bone Health and a healthy immune system.  Discussed Daily exercise and the importance of same, in regards to a healthy heart as well as helping to maintain her weight and improving her mental health.  BMI  22.7.  Patient declines colonoscopy.  Mammogram will be scheduled at [facility].  Pap smear is done due to patient's request.  We  discussed ASCCP guidelines.  After informed decision patient wishes to proceed with the Pap smear.        -     Liquid-based Pap Smear, Screening  -     CBC & Differential  -     Lipid Panel With LDL / HDL Ratio  -     Comprehensive Metabolic Panel  -     TSH  -     T3, Uptake  -     T4, Free  -     Hemoglobin A1c  -     Urine Culture - , Urine, Clean Catch  -     UA / M With / Rflx Culture([facility] ONLY) - Urine, Clean Catch  -     Hepatitis C Antibody    2. Encounter for screening mammogram for breast cancer  Comments:  Patient will have a mammogram at [facility].  Orders:  -     Mammo Screening Digital Tomosynthesis Bilateral With CAD; Future    3. Menorrhagia with regular cycle  Comments:  She had an ultrasound done over a year ago and had a thickened endometrium.  She was supposed to have a endometrial biopsy at that time.  She was insurance at that time and did not return.  Due to the fact she may have an infection today we did not do an endometrial biopsy.  She will return for repeat pelvic ultrasound and then have a biopsy at that time.  She would like to have a hysterectomy due to the excessive bleeding that she has.  She will see Dr. Marie to follow-up with that.  She is also given a pamphlet today on ablation.    4. Family history of breast cancer  Comments:  Patient has family history of breast cancer.  We discussed genetic screening.  If she desires to have that done she will do when she gets her mammogram done.    5. Vaginal discharge  Comments:  Patient has a moderate amount of yellow discharge.  BV panel is sent to lab.  Orders:  -     Liquid-based Pap Smear, Screening    6. Vitamin D deficiency  Comments:  Patient will have labs drawn.  Orders:  -     Vitamin D,25-Hydroxy         BMI is within normal parameters. No other follow-up for BMI required.      Michelle Larson, APRN  10/31/2023

## 2023-11-03 LAB
25(OH)D3+25(OH)D2 SERPL-MCNC: 31.8 NG/ML (ref 30–100)
ALBUMIN SERPL-MCNC: 4.6 G/DL (ref 3.5–5.2)
ALBUMIN/GLOB SERPL: 1.9 G/DL
ALP SERPL-CCNC: 44 U/L (ref 39–117)
ALT SERPL-CCNC: 9 U/L (ref 1–33)
AST SERPL-CCNC: 16 U/L (ref 1–32)
BASOPHILS # BLD AUTO: 0.08 10*3/MM3 (ref 0–0.2)
BASOPHILS NFR BLD AUTO: 1.6 % (ref 0–1.5)
BILIRUB SERPL-MCNC: 0.4 MG/DL (ref 0–1.2)
BUN SERPL-MCNC: 10 MG/DL (ref 6–20)
BUN/CREAT SERPL: 13.3 (ref 7–25)
CALCIUM SERPL-MCNC: 9.4 MG/DL (ref 8.6–10.5)
CHLORIDE SERPL-SCNC: 106 MMOL/L (ref 98–107)
CHOLEST SERPL-MCNC: 166 MG/DL (ref 0–200)
CO2 SERPL-SCNC: 26 MMOL/L (ref 22–29)
CREAT SERPL-MCNC: 0.75 MG/DL (ref 0.57–1)
EGFRCR SERPLBLD CKD-EPI 2021: 100.2 ML/MIN/1.73
EOSINOPHIL # BLD AUTO: 0.07 10*3/MM3 (ref 0–0.4)
EOSINOPHIL NFR BLD AUTO: 1.4 % (ref 0.3–6.2)
ERYTHROCYTE [DISTWIDTH] IN BLOOD BY AUTOMATED COUNT: 15.7 % (ref 12.3–15.4)
GLOBULIN SER CALC-MCNC: 2.4 GM/DL
GLUCOSE SERPL-MCNC: 90 MG/DL (ref 65–99)
HBA1C MFR BLD: 5.3 % (ref 4.8–5.6)
HCT VFR BLD AUTO: 40 % (ref 34–46.6)
HCV IGG SERPL QL IA: NON REACTIVE
HDLC SERPL-MCNC: 51 MG/DL (ref 40–60)
HGB BLD-MCNC: 12.5 G/DL (ref 12–15.9)
IMM GRANULOCYTES # BLD AUTO: 0.01 10*3/MM3 (ref 0–0.05)
IMM GRANULOCYTES NFR BLD AUTO: 0.2 % (ref 0–0.5)
LDLC SERPL CALC-MCNC: 97 MG/DL (ref 0–100)
LDLC/HDLC SERPL: 1.88 {RATIO}
LYMPHOCYTES # BLD AUTO: 2.2 10*3/MM3 (ref 0.7–3.1)
LYMPHOCYTES NFR BLD AUTO: 44.2 % (ref 19.6–45.3)
MCH RBC QN AUTO: 25.3 PG (ref 26.6–33)
MCHC RBC AUTO-ENTMCNC: 31.3 G/DL (ref 31.5–35.7)
MCV RBC AUTO: 81 FL (ref 79–97)
MONOCYTES # BLD AUTO: 0.43 10*3/MM3 (ref 0.1–0.9)
MONOCYTES NFR BLD AUTO: 8.6 % (ref 5–12)
NEUTROPHILS # BLD AUTO: 2.19 10*3/MM3 (ref 1.7–7)
NEUTROPHILS NFR BLD AUTO: 44 % (ref 42.7–76)
NRBC BLD AUTO-RTO: 0 /100 WBC (ref 0–0.2)
PLATELET # BLD AUTO: 331 10*3/MM3 (ref 140–450)
POTASSIUM SERPL-SCNC: 4.6 MMOL/L (ref 3.5–5.2)
PROT SERPL-MCNC: 7 G/DL (ref 6–8.5)
RBC # BLD AUTO: 4.94 10*6/MM3 (ref 3.77–5.28)
SODIUM SERPL-SCNC: 141 MMOL/L (ref 136–145)
T3RU NFR SERPL: 23 % (ref 24–39)
T4 FREE SERPL-MCNC: 0.98 NG/DL (ref 0.93–1.7)
TRIGL SERPL-MCNC: 95 MG/DL (ref 0–150)
TSH SERPL DL<=0.005 MIU/L-ACNC: 1.73 UIU/ML (ref 0.27–4.2)
VLDLC SERPL CALC-MCNC: 18 MG/DL (ref 5–40)
WBC # BLD AUTO: 4.98 10*3/MM3 (ref 3.4–10.8)

## 2023-11-03 RX ORDER — ERGOCALCIFEROL 1.25 MG/1
50000 CAPSULE ORAL WEEKLY
Qty: 5 CAPSULE | Refills: 3 | Status: SHIPPED | OUTPATIENT
Start: 2023-11-03

## 2023-11-06 LAB — HPV I/H RISK 4 DNA CVX QL PROBE+SIG AMP: NOT DETECTED

## 2023-11-07 LAB
GEN CATEG CVX/VAG CYTO-IMP: NORMAL
LAB AP CASE REPORT: NORMAL
LAB AP GYN ADDITIONAL INFORMATION: NORMAL
Lab: NORMAL
PATH INTERP SPEC-IMP: NORMAL
STAT OF ADQ CVX/VAG CYTO-IMP: NORMAL
TRICHOMONAS VAGINALIS PCR: NOT DETECTED

## 2023-11-07 RX ORDER — FLUCONAZOLE 150 MG/1
150 TABLET ORAL ONCE
Qty: 2 TABLET | Refills: 0 | Status: SHIPPED | OUTPATIENT
Start: 2023-11-07 | End: 2023-11-07

## 2023-11-10 ENCOUNTER — OFFICE VISIT (OUTPATIENT)
Dept: OBSTETRICS AND GYNECOLOGY | Facility: CLINIC | Age: 43
End: 2023-11-10
Payer: COMMERCIAL

## 2023-11-10 VITALS
BODY MASS INDEX: 23 KG/M2 | WEIGHT: 125 LBS | HEIGHT: 62 IN | DIASTOLIC BLOOD PRESSURE: 78 MMHG | SYSTOLIC BLOOD PRESSURE: 112 MMHG

## 2023-11-10 DIAGNOSIS — R93.89 THICKENED ENDOMETRIUM: Primary | ICD-10-CM

## 2023-11-10 DIAGNOSIS — N92.0 MENORRHAGIA WITH REGULAR CYCLE: ICD-10-CM

## 2023-11-10 LAB
B-HCG UR QL: NEGATIVE
EXPIRATION DATE: NORMAL
INTERNAL NEGATIVE CONTROL: NEGATIVE
INTERNAL POSITIVE CONTROL: POSITIVE
Lab: NORMAL

## 2023-11-10 PROCEDURE — 88305 TISSUE EXAM BY PATHOLOGIST: CPT | Performed by: NURSE PRACTITIONER

## 2023-11-10 RX ORDER — FLUCONAZOLE 150 MG/1
TABLET ORAL
COMMUNITY
Start: 2023-11-07

## 2023-11-10 NOTE — PROGRESS NOTES
Procedure   Procedures   An endometrial biopsy was performed in the office today.  The cervix was visualized with a speculum and prepped with Betadine.  The anterior lip was grasped with a tenaculum and a standard endometrial sampling Pipelle was passed into the uterine cavity.  The patient experienced moderate cramping and the uterus sounded to 6 cm.  A moderate amount of tissue was obtained with 2 passes.  The tenaculum was removed and the site was hemostatic.  She tolerated the procedure well.

## 2023-11-10 NOTE — PROGRESS NOTES
"Subjective     Aubree Alegria is a 43 y.o. female    History of Present Illness  Patient comes in today for endometrial biopsy.  She had a thickened endometrium on ultrasound.  It was 17 mm.  She is having very heavy periods.  She would like to have an ablation done.  Menorrhagia  This is a recurrent problem. The current episode started more than 1 year ago. Pertinent negatives include no abdominal pain, anorexia, arthralgias, change in bowel habit, chest pain, chills, congestion, coughing, diaphoresis, fatigue, fever, headaches, joint swelling, myalgias, nausea, neck pain, numbness, rash, sore throat, swollen glands, urinary symptoms, vertigo, visual change, vomiting or weakness. Nothing aggravates the symptoms. She has tried nothing for the symptoms.         /78 (BP Location: Left arm, Patient Position: Sitting, Cuff Size: Adult)   Ht 157.5 cm (62.01\")   Wt 56.7 kg (125 lb)   LMP 10/25/2023   Breastfeeding No   BMI 22.86 kg/m²     Outpatient Encounter Medications as of 11/10/2023   Medication Sig Dispense Refill    fluconazole (DIFLUCAN) 150 MG tablet TAKE 1 TABLET BY MOUTH ONCE A WEEK FOR 2 WEEKS      vitamin D (ERGOCALCIFEROL) 1.25 MG (79391 UT) capsule capsule Take 1 capsule by mouth 1 (One) Time Per Week. 5 capsule 3     No facility-administered encounter medications on file as of 11/10/2023.       Past Medical History  History reviewed. No pertinent past medical history.    Surgical History  Past Surgical History:   Procedure Laterality Date    APPENDECTOMY      BREAST BIOPSY Bilateral     benign     SECTION      x2    ECTOPIC PREGNANCY      TUBAL ABDOMINAL LIGATION      WISDOM TOOTH EXTRACTION         Family History  Family History   Problem Relation Age of Onset    Kidney cancer Maternal Aunt     Hypertension Father     No Known Problems Mother     No Known Problems Sister     Leukemia Paternal Grandmother     Breast cancer Paternal Aunt 40    Ovarian cancer Neg Hx     Uterine cancer Neg " Hx     Colon cancer Neg Hx     Melanoma Neg Hx     Prostate cancer Neg Hx        The following portions of the patient's history were reviewed and updated as appropriate: allergies, current medications, past family history, past medical history, past social history, past surgical history, and problem list.    Review of Systems   Constitutional:  Negative for activity change, appetite change, chills, diaphoresis, fatigue, fever, unexpected weight gain and unexpected weight loss.   HENT:  Negative for congestion, dental problem, drooling, ear discharge, ear pain, facial swelling, hearing loss, mouth sores, nosebleeds, postnasal drip, rhinorrhea, sinus pressure, sneezing, sore throat, swollen glands, tinnitus, trouble swallowing and voice change.    Eyes:  Negative for blurred vision, double vision, photophobia, pain, discharge, redness, itching and visual disturbance.   Respiratory:  Negative for apnea, cough, choking, chest tightness, shortness of breath, wheezing and stridor.    Cardiovascular:  Negative for chest pain, palpitations and leg swelling.   Gastrointestinal:  Negative for abdominal distention, abdominal pain, anal bleeding, anorexia, blood in stool, change in bowel habit, constipation, diarrhea, nausea, rectal pain, vomiting, GERD and indigestion.   Endocrine: Negative for cold intolerance, heat intolerance, polydipsia, polyphagia and polyuria.   Genitourinary:  Positive for menorrhagia and menstrual problem. Negative for amenorrhea, breast discharge, breast lump, breast pain, decreased libido, decreased urine volume, difficulty urinating, dyspareunia, dysuria, flank pain, frequency, genital sores, hematuria, pelvic pain, pelvic pressure, urgency, urinary incontinence, vaginal bleeding, vaginal discharge and vaginal pain.   Musculoskeletal:  Negative for arthralgias, back pain, gait problem, joint swelling, myalgias, neck pain, neck stiffness and bursitis.   Skin:  Negative for color change, dry skin and  rash.   Allergic/Immunologic: Negative for environmental allergies, food allergies and immunocompromised state.   Neurological:  Negative for dizziness, vertigo, tremors, seizures, syncope, facial asymmetry, speech difficulty, weakness, light-headedness, numbness, headache, memory problem and confusion.   Hematological:  Negative for adenopathy. Does not bruise/bleed easily.   Psychiatric/Behavioral:  Negative for agitation, behavioral problems, decreased concentration, dysphoric mood, hallucinations, self-injury, sleep disturbance, suicidal ideas, negative for hyperactivity, depressed mood and stress. The patient is not nervous/anxious.        Objective   Physical Exam  Vitals and nursing note reviewed. Exam conducted with a chaperone present.   Constitutional:       Appearance: She is well-developed.   HENT:      Head: Normocephalic and atraumatic.   Abdominal:      General: There is no distension.      Palpations: Abdomen is soft.      Tenderness: There is no abdominal tenderness.      Hernia: There is no hernia in the left inguinal area or right inguinal area.   Genitourinary:     General: Normal vulva.      Exam position: Lithotomy position.      Labia:         Right: No rash, tenderness, lesion or injury.         Left: No rash, tenderness, lesion or injury.       Vagina: Normal. No vaginal discharge, erythema, tenderness or bleeding.      Cervix: Normal.      Uterus: Normal. Not enlarged and not tender.       Adnexa: Right adnexa normal and left adnexa normal.        Right: No mass, tenderness or fullness.          Left: No mass, tenderness or fullness.     Lymphadenopathy:      Lower Body: No right inguinal adenopathy. No left inguinal adenopathy.   Skin:     General: Skin is warm and dry.   Neurological:      Mental Status: She is alert and oriented to person, place, and time.   Psychiatric:         Mood and Affect: Mood normal.         Behavior: Behavior normal.         Thought Content: Thought content  normal.         Judgment: Judgment normal.       PHQ-9 Depression Screening  Little interest or pleasure in doing things?  0   Feeling down, depressed, or hopeless?  0   Trouble falling or staying asleep, or sleeping too much?     Feeling tired or having little energy?     Poor appetite or overeating?     Feeling bad about yourself - or that you are a failure or have let yourself or your family down?     Trouble concentrating on things, such as reading the newspaper or watching television?     Moving or speaking so slowly that other people could have noticed? Or the opposite - being so fidgety or restless that you have been moving around a lot more than usual?     Thoughts that you would be better off dead, or of hurting yourself in some way?     PHQ-9 Total Score  0   If you checked off any problems, how difficult have these problems made it for you to do your work, take care of things at home, or get along with other people?          Assessment & Plan   Diagnoses and all orders for this visit:    1. Thickened endometrium (Primary)  Comments:  Patient is here today for endometrial biopsy due to thickened endometrium on ultrasound.  Her last ultrasound shows her endometrium to be 17 mm.  UPT was negative.  Endobiopsy was done.  See procedure note.  Orders:  -     POC Pregnancy, Urine  -     Tissue Pathology Exam    2. Menorrhagia with regular cycle  Comments:  Patient is having very heavy cycles.  She would like to have an ablation or hysterectomy.  She was given a pamphlet on same last visit.  She will return to see Dr. Marie for discussion and follow-up.         BMI is within normal parameters. No other follow-up for BMI required.      Michelle Larson, APRN  11/10/2023

## 2023-11-14 LAB
LAB AP CASE REPORT: NORMAL
LAB AP CLINICAL INFORMATION: NORMAL
Lab: NORMAL
PATH REPORT.FINAL DX SPEC: NORMAL
PATH REPORT.GROSS SPEC: NORMAL

## 2023-12-29 ENCOUNTER — OFFICE VISIT (OUTPATIENT)
Dept: OBSTETRICS AND GYNECOLOGY | Facility: CLINIC | Age: 43
End: 2023-12-29
Payer: COMMERCIAL

## 2023-12-29 VITALS
SYSTOLIC BLOOD PRESSURE: 114 MMHG | WEIGHT: 122 LBS | BODY MASS INDEX: 22.45 KG/M2 | DIASTOLIC BLOOD PRESSURE: 62 MMHG | HEIGHT: 62 IN

## 2023-12-29 DIAGNOSIS — N92.0 MENORRHAGIA WITH REGULAR CYCLE: Primary | ICD-10-CM

## 2023-12-29 DIAGNOSIS — G43.109 MIGRAINE WITH AURA AND WITHOUT STATUS MIGRAINOSUS, NOT INTRACTABLE: ICD-10-CM

## 2023-12-29 DIAGNOSIS — Z80.3 FAMILY HISTORY OF BREAST CANCER: ICD-10-CM

## 2023-12-29 PROCEDURE — 99214 OFFICE O/P EST MOD 30 MIN: CPT | Performed by: OBSTETRICS & GYNECOLOGY

## 2023-12-29 NOTE — PROGRESS NOTES
[facility]  Aubree Alegria  : 1980  MRN: [id number]  CSN: [id number]    History and Physical    Subjective   Aubree Alegria is a 43 y.o. year old  who presents for consultation about menorrhagia.  Menses are regular, but last 7 whole days.  She uses super tampons with an overnight pad as the backup, and will have to change both (pad completely saturated) about every 3 hours.  Sometimes menses includes large clots.  Aubree reports pelvic pressure that is painful, but denies cramping.    Patient has always been told she is not a pill candidate since the patient has auras with migraine.  Because of this, she has not really tried anything to improve her bleeding profile.    The patient already had a pelvic ultrasound and an endometrial biopsy as Michelle Larson was preparing the patient to see a physician.  Her endometrial biopsy was benign, and the transvaginal ultrasound revealed an 8.8 cm fundal fibroid (previously 6.6 cm 2 years ago).  Both of those studies are copied below.    No past medical history on file.  Past Surgical History:   Procedure Laterality Date    APPENDECTOMY      BREAST BIOPSY Bilateral     benign     SECTION      x2    ECTOPIC PREGNANCY      TUBAL ABDOMINAL LIGATION      WISDOM TOOTH EXTRACTION       Social History    Tobacco Use      Smoking status: Former      Smokeless tobacco: Never      Current Outpatient Medications:     vitamin D (ERGOCALCIFEROL) 1.25 MG (15129 UT) capsule capsule, Take 1 capsule by mouth 1 (One) Time Per Week., Disp: 5 capsule, Rfl: 3    Allergies   Allergen Reactions    Ketorolac Tromethamine Hives    Tramadol Hives       Family History   Problem Relation Age of Onset    Kidney cancer Maternal Aunt     Hypertension Father     No Known Problems Mother     No Known Problems Sister     Leukemia Paternal Grandmother     Breast cancer Paternal Aunt 40    Ovarian cancer Neg Hx     Uterine cancer Neg Hx     Colon cancer Neg Hx     Melanoma Neg Hx      "Prostate cancer Neg Hx      Review of Systems   Constitutional:  Negative for activity change and unexpected weight change.   Respiratory:  Negative for shortness of breath.    Cardiovascular:  Negative for chest pain.   Gastrointestinal:  Negative for abdominal pain.   Genitourinary:  Positive for menstrual problem. Negative for pelvic pain.         Objective   /62   Ht 157.5 cm (62\")   Wt 55.3 kg (122 lb)   LMP 12/15/2023 (Approximate)   BMI 22.31 kg/m²     Physical Exam   Physical Exam  Vitals and nursing note reviewed.   Constitutional:       General: She is not in acute distress.     Appearance: She is well-developed.   HENT:      Head: Normocephalic and atraumatic.   Neck:      Thyroid: No thyromegaly.   Pulmonary:      Effort: Pulmonary effort is normal.   Abdominal:      General: There is no distension.      Palpations: Abdomen is soft.      Tenderness: There is no abdominal tenderness.   Musculoskeletal:         General: Normal range of motion.      Cervical back: Normal range of motion.   Skin:     General: Skin is warm and dry.   Neurological:      Mental Status: She is alert and oriented to person, place, and time.   Psychiatric:         Mood and Affect: Mood normal.         Behavior: Behavior normal.         Thought Content: Thought content normal.         Judgment: Judgment normal.         Labs  Lab Results   Component Value Date     11/02/2023    HGB 12.5 11/02/2023    HCT 40.0 11/02/2023    WBC 4.98 11/02/2023     11/02/2023    K 4.6 11/02/2023     11/02/2023    CO2 26.0 11/02/2023    BUN 10 11/02/2023    CREATININE 0.75 11/02/2023    GLUCOSE 90 11/02/2023    ALBUMIN 4.6 11/02/2023    CALCIUM 9.4 11/02/2023    AST 16 11/02/2023    ALT 9 11/02/2023    BILITOT 0.4 11/02/2023       Final Diagnosis   Endometrium, biopsy:  -Secretory endometrium (day 20).  -No evidence of complex hyperplasia, atypia, or malignancy.   Electronically signed by Kehr, Elizabeth L, MD on 11/14/2023 " at 1142     Impression:     1.  Uterus: Enlarged, with uterine volume of 412 ml, and Anteverted     2.  Endometrium:  17 mm      3.  Myometrium:  Large 8.8 cm posterior fundal fibroid     4.  Ovaries  Left:    Normal/unremarkable   Right:  Normal/unremarkable         Relevant comparison data: No relevant comparison data     Colin Reyes MD  11/2/2023 17:33 CDT   Assessment & Plan    Diagnoses and all orders for this visit:    1. Menorrhagia with regular cycle (Primary): We reviewed options for the patient's menorrhagia to include Mirena IUD versus da Stevan assisted laparoscopic hysterectomy.  The patient is not a great candidate for combination OCPs since she has migraine with aura and already knows that this increases her of CVA.  She is also not a great candidate for an endometrial ablation since her large fibroid may diminish the improvement she gets from seizure.  The Mirena brochure was reviewed with the patient and all of her questions answered.  We have also discussed laparoscopic hysterectomy, including all of the surgical risks associated with it.  Additionally, we have discussed the patient's family history of breast cancer.  If she elects to have a hysterectomy, the patient will need to decide whether she would like to have risk reducing oophorectomy at the same time.  The pros and cons of this have been discussed with the patient.  She is decided she would like to see the genetic counselor and have BRCA testing before she decides how to proceed.  The patient took the Mirena brochure with her and will be considering her options.  The patient will return to the office in 6 weeks.    2. Family history of breast cancer  Comments:  Paternal aunt at age 40  Orders:  -     Ambulatory Referral to Genetic Counseling/Testing    3. Migraine with aura and without status migrainosus, not intractable        Kathy Marie MD  12/29/2023  13:31 CST

## 2024-03-19 ENCOUNTER — TELEPHONE (OUTPATIENT)
Dept: GENETICS | Facility: HOSPITAL | Age: 44
End: 2024-03-19
Payer: COMMERCIAL

## 2024-03-20 ENCOUNTER — TELEPHONE (OUTPATIENT)
Dept: GENETICS | Facility: HOSPITAL | Age: 44
End: 2024-03-20
Payer: COMMERCIAL

## 2024-03-20 NOTE — TELEPHONE ENCOUNTER
Left VM letting pt know I received her VM about wanting to cancel her genetics appt. Left genetics scheduling phone number for when pt is ready to reschedule.

## 2024-04-03 ENCOUNTER — TELEPHONE (OUTPATIENT)
Dept: OBSTETRICS AND GYNECOLOGY | Age: 44
End: 2024-04-03
Payer: COMMERCIAL

## 2024-04-03 NOTE — TELEPHONE ENCOUNTER
Mychart message sent to pt reminding of mammogram needing scheduled   
- BP initially elevated to 188  - continue on amlodipine 10mg, hydralazine 10mg with hold parameters given bleed   - monitor vitals q 4hrs

## 2024-04-17 ENCOUNTER — OFFICE VISIT (OUTPATIENT)
Dept: OBSTETRICS AND GYNECOLOGY | Age: 44
End: 2024-04-17
Payer: COMMERCIAL

## 2024-04-17 VITALS
BODY MASS INDEX: 23 KG/M2 | WEIGHT: 125 LBS | DIASTOLIC BLOOD PRESSURE: 82 MMHG | SYSTOLIC BLOOD PRESSURE: 122 MMHG | HEIGHT: 62 IN

## 2024-04-17 DIAGNOSIS — Z80.3 FAMILY HISTORY OF BREAST CANCER: ICD-10-CM

## 2024-04-17 DIAGNOSIS — N92.0 MENORRHAGIA WITH REGULAR CYCLE: Primary | ICD-10-CM

## 2024-04-17 PROCEDURE — 99214 OFFICE O/P EST MOD 30 MIN: CPT | Performed by: OBSTETRICS & GYNECOLOGY

## 2024-04-17 RX ORDER — SCOLOPAMINE TRANSDERMAL SYSTEM 1 MG/1
1 PATCH, EXTENDED RELEASE TRANSDERMAL CONTINUOUS
OUTPATIENT
Start: 2024-04-17 | End: 2024-04-20

## 2024-04-17 RX ORDER — SODIUM CHLORIDE 9 MG/ML
40 INJECTION, SOLUTION INTRAVENOUS AS NEEDED
OUTPATIENT
Start: 2024-04-17

## 2024-04-17 RX ORDER — GABAPENTIN 100 MG/1
600 CAPSULE ORAL ONCE
OUTPATIENT
Start: 2024-04-17 | End: 2024-04-17

## 2024-04-17 RX ORDER — SODIUM CHLORIDE 0.9 % (FLUSH) 0.9 %
10 SYRINGE (ML) INJECTION AS NEEDED
OUTPATIENT
Start: 2024-04-17

## 2024-04-17 RX ORDER — ACETAMINOPHEN 500 MG
1000 TABLET ORAL ONCE
OUTPATIENT
Start: 2024-04-17 | End: 2024-04-17

## 2024-04-17 RX ORDER — PHENAZOPYRIDINE HYDROCHLORIDE 100 MG/1
200 TABLET, FILM COATED ORAL ONCE
OUTPATIENT
Start: 2024-04-17 | End: 2024-04-17

## 2024-04-17 RX ORDER — SODIUM CHLORIDE 0.9 % (FLUSH) 0.9 %
10 SYRINGE (ML) INJECTION EVERY 12 HOURS SCHEDULED
OUTPATIENT
Start: 2024-04-17

## 2024-04-17 NOTE — PROGRESS NOTES
[facility]  Aubree Alegria  : 1980  MRN: [id number]  [facility]: [id number]    History and Physical    Subjective   Aurbee Alegria is a 44 y.o. year old  who presents for consultation about menorrhagia.  Patient here to schedule a hysterectomy for treatment of her heavy periods.  The patient had endometrial biopsy in the fall, and preparation for hysterectomy.  We delayed scheduling her surgery at that time because the patient was going to have BRCA testing due to her family history.  The patient did not have insurance coverage for the test, and has simply decided to go ahead and have her ovaries removed since she is already close to being menopausal.  She has plans to start HRT following surgery, if she is symptomatic.    Menses are regular, but last 7 whole days.  She uses super tampons with an overnight pad as the backup, and will have to change both (pad completely saturated) about every 3 hours.  Sometimes menses includes large clots.  Aubree reports pelvic pressure that is painful, but denies cramping.    Patient has always been told she is not a pill candidate since the patient has auras with migraine.  Because of this, she has not really tried anything to improve her bleeding profile.    The patient already had a pelvic ultrasound and an endometrial biopsy as Michelle Larson was preparing the patient to see a physician.  Her endometrial biopsy was benign, and the transvaginal ultrasound revealed an 8.8 cm fundal fibroid (previously 6.6 cm 2 years ago).  Both of those studies are copied below.    No past medical history on file.  Past Surgical History:   Procedure Laterality Date    APPENDECTOMY      BREAST BIOPSY Bilateral     benign     SECTION      x2    ECTOPIC PREGNANCY      TUBAL ABDOMINAL LIGATION      WISDOM TOOTH EXTRACTION       Social History    Tobacco Use      Smoking status: Former      Smokeless tobacco: Never    No current outpatient medications on file.    Allergies   Allergen  "Reactions    Ketorolac Tromethamine Hives    Tramadol Hives       Family History   Problem Relation Age of Onset    Kidney cancer Maternal Aunt     Hypertension Father     No Known Problems Mother     No Known Problems Sister     Leukemia Paternal Grandmother     Breast cancer Paternal Aunt 40    Ovarian cancer Neg Hx     Uterine cancer Neg Hx     Colon cancer Neg Hx     Melanoma Neg Hx     Prostate cancer Neg Hx      Review of Systems   Constitutional:  Negative for activity change and unexpected weight change.   Respiratory:  Negative for shortness of breath.    Cardiovascular:  Negative for chest pain.   Gastrointestinal:  Negative for abdominal pain.   Genitourinary:  Positive for menstrual problem. Negative for pelvic pain.         Objective   /82   Ht 157.5 cm (62\")   Wt 56.7 kg (125 lb)   LMP 04/01/2024 (Exact Date)   BMI 22.86 kg/m²     Physical Exam   Physical Exam  Vitals and nursing note reviewed.   Constitutional:       General: She is not in acute distress.     Appearance: She is well-developed.   HENT:      Head: Normocephalic and atraumatic.   Neck:      Thyroid: No thyromegaly.   Pulmonary:      Effort: Pulmonary effort is normal.   Abdominal:      General: There is no distension.      Palpations: Abdomen is soft.      Tenderness: There is no abdominal tenderness.   Musculoskeletal:         General: Normal range of motion.      Cervical back: Normal range of motion.   Skin:     General: Skin is warm and dry.   Neurological:      Mental Status: She is alert and oriented to person, place, and time.   Psychiatric:         Mood and Affect: Mood normal.         Behavior: Behavior normal.         Thought Content: Thought content normal.         Judgment: Judgment normal.         Labs  Lab Results   Component Value Date     11/02/2023    HGB 12.5 11/02/2023    HCT 40.0 11/02/2023    WBC 4.98 11/02/2023     11/02/2023    K 4.6 11/02/2023     11/02/2023    CO2 26.0 11/02/2023    " BUN 10 11/02/2023    CREATININE 0.75 11/02/2023    GLUCOSE 90 11/02/2023    ALBUMIN 4.6 11/02/2023    CALCIUM 9.4 11/02/2023    AST 16 11/02/2023    ALT 9 11/02/2023    BILITOT 0.4 11/02/2023       Final Diagnosis   Endometrium, biopsy:  -Secretory endometrium (day 20).  -No evidence of complex hyperplasia, atypia, or malignancy.   Electronically signed by Kehr, Elizabeth L, MD on 11/14/2023 at 1142     Impression:     1.  Uterus: Enlarged, with uterine volume of 412 ml, and Anteverted     2.  Endometrium:  17 mm      3.  Myometrium:  Large 8.8 cm posterior fundal fibroid     4.  Ovaries  Left:    Normal/unremarkable   Right:  Normal/unremarkable         Relevant comparison data: No relevant comparison data     Colin Reyes MD  11/2/2023 17:33 CDT     Assessment & Plan    1. Menorrhagia with regular cycle (Primary): We reviewed options for the patient's menorrhagia to include Mirena IUD versus da Stevan assisted laparoscopic hysterectomy at her previous visit.  The patient is not a great candidate for combination OCPs since she has migraine with aura and already knows that this increases her of CVA.  She is also not a great candidate for an endometrial ablation since her large fibroid may diminish the improvement she gets from seizure.  The Mirena brochure was reviewed with the patient and all of her questions answered.  We have also discussed laparoscopic hysterectomy, including all of the surgical risks associated with it.  Additionally, we have discussed the patient's family history of breast cancer, with her and having a diagnosis of breast cancer at only 38 years old.  The patient feels like hysterectomy is the best choice for her, and would like to have her uterus, cervix, fallopian tubes, and ovaries all removed.  Risks of laparoscopic surgery were reviewed to include, but are not limited to, the possibility of bowel perforation requiring possible bowel resection and/or colostomy, possible bladder  injury or possible and possible vascular injury which could require the need for a blood transfusion.  Possible need for conversion to a laparotomy was also discussed.  The patient's questions were answered to her satisfaction.  Post-op restrictions and typical post-op course were also reviewed.   -     Case Request; Standing  -     CBC and Differential; Future  -     ECG 12 Lead; Future  -     sodium chloride 0.9 % flush 10 mL  -     sodium chloride 0.9 % flush 10 mL  -     sodium chloride 0.9 % infusion 40 mL  -     phenazopyridine (PYRIDIUM) tablet 200 mg  -     ceFAZolin (ANCEF) 2,000 mg in sodium chloride 0.9 % 100 mL IVPB  -     acetaminophen (TYLENOL) tablet 1,000 mg  -     gabapentin (NEURONTIN) capsule 600 mg  -     scopolamine patch 1 mg/72 hr  -     Case Request    2. Family history of breast cancer    Other orders  -     Code Status and Medical Interventions:; Standing  -     Follow Anesthesia Guidelines / Protocol; Future  -     Follow Anesthesia Guidelines / Protocol; Standing  -     Chlorhexidine Skin Prep; Future  -     Verify / Perform Chlorhexidine Skin Prep; Standing  -     Type & Screen; Standing  -     Insert Peripheral IV; Standing  -     Saline Lock & Maintain IV Access; Standing  -     Place Sequential Compression Device; Standing  -     Maintain Sequential Compression Device; Standing  -     Obtain Informed Consent; Standing  -     Verify NPO Status; Standing         Kathy Marie MD  4/17/2024  10:15 CDT

## 2024-08-08 ENCOUNTER — TELEPHONE (OUTPATIENT)
Dept: OBSTETRICS AND GYNECOLOGY | Age: 44
End: 2024-08-08
Payer: COMMERCIAL

## 2024-08-08 NOTE — TELEPHONE ENCOUNTER
Provider: OZZY ASHLEY MD    Caller: UNA HINSON    Relationship to Patient: SELF    Phone Number: [phone]    Reason for Call: PT WOULD LIKE TO SCHEDULE TO HAVE A HYSTERECTOMY.     OK TO CALL PT ANYTIME/ OK TO LEAVE A VM

## 2024-08-08 NOTE — TELEPHONE ENCOUNTER
Pt called to scheduled hyst for October. Pt advised that she will have to come back in to the office for another pre op visit and to updated hyst consent. Pt sent to scheduling to set up an appointment.

## 2024-09-10 ENCOUNTER — OFFICE VISIT (OUTPATIENT)
Dept: OBSTETRICS AND GYNECOLOGY | Age: 44
End: 2024-09-10
Payer: COMMERCIAL

## 2024-09-10 ENCOUNTER — TELEPHONE (OUTPATIENT)
Dept: OBSTETRICS AND GYNECOLOGY | Age: 44
End: 2024-09-10
Payer: COMMERCIAL

## 2024-09-10 ENCOUNTER — TELEPHONE (OUTPATIENT)
Age: 44
End: 2024-09-10
Payer: COMMERCIAL

## 2024-09-10 VITALS
SYSTOLIC BLOOD PRESSURE: 118 MMHG | BODY MASS INDEX: 22.45 KG/M2 | HEIGHT: 62 IN | WEIGHT: 122 LBS | DIASTOLIC BLOOD PRESSURE: 78 MMHG

## 2024-09-10 DIAGNOSIS — R93.89 THICKENED ENDOMETRIUM: ICD-10-CM

## 2024-09-10 DIAGNOSIS — Z80.3 FAMILY HISTORY OF BREAST CANCER: ICD-10-CM

## 2024-09-10 DIAGNOSIS — G43.109 MIGRAINE WITH AURA AND WITHOUT STATUS MIGRAINOSUS, NOT INTRACTABLE: ICD-10-CM

## 2024-09-10 DIAGNOSIS — N92.0 MENORRHAGIA WITH REGULAR CYCLE: Primary | ICD-10-CM

## 2024-09-10 PROCEDURE — 99214 OFFICE O/P EST MOD 30 MIN: CPT | Performed by: OBSTETRICS & GYNECOLOGY

## 2024-09-10 NOTE — TELEPHONE ENCOUNTER
Called and informed pt her sx arrival time changed and she needs to arrive at 0530, pt voiced understanding.

## 2024-09-10 NOTE — TELEPHONE ENCOUNTER
Pt left without checking out. Attempted to call and get post op appt scheduled 2 weeks after 10/21 with Dr Marie. No answer, left vm.

## 2024-09-10 NOTE — PROGRESS NOTES
[facility]  Aubree Alegria  : 1980  MRN: [id number]  [facility]: [id number]    History and Physical    Subjective   Aubree Alegria is a 44 y.o. year old  who presents for consultation about menorrhagia.  Patient here  for pre-op consultation and to schedule a hysterectomy for treatment of her heavy periods.  We discussed the hysterectomy in May, but the patient did not want to do anything surgical during the summer months.  The patient had endometrial biopsy that was benign last  - she has been planning for a hysterectomy ever since that time.  We initially delayed scheduling her surgery at that time because the patient was going to have BRCA testing due to her family history.  The patient did not have insurance coverage for the test, and has simply decided to go ahead and have her ovaries removed since she is already close to being menopausal.  She has plans to start HRT following surgery, if she is symptomatic.    Menses are regular, but last 7 whole days.  She uses super tampons with an overnight pad as the backup, and will have to change both (pad completely saturated) about every 3 hours.  Sometimes menses includes large clots.  Aubree reports pelvic pressure that is painful, but denies cramping.    Patient has always been told she is not a pill candidate since the patient has auras with migraine.  Because of this, she has not really tried anything to improve her bleeding profile.    The patient already had a pelvic ultrasound and an endometrial biopsy as Michelle Larson was preparing the patient to see a physician.  Her endometrial biopsy was benign, and the transvaginal ultrasound revealed an 8.8 cm fundal fibroid (previously 6.6 cm 2 years ago).  Both of those studies are copied below.    No past medical history on file.  Past Surgical History:   Procedure Laterality Date    APPENDECTOMY      BREAST BIOPSY Bilateral     benign     SECTION      x2    ECTOPIC PREGNANCY      TUBAL ABDOMINAL  "LIGATION      WISDOM TOOTH EXTRACTION       Social History    Tobacco Use      Smoking status: Former      Smokeless tobacco: Never    No current outpatient medications on file.    Allergies   Allergen Reactions    Ketorolac Tromethamine Hives    Tramadol Hives       Family History   Problem Relation Age of Onset    Kidney cancer Maternal Aunt     Hypertension Father     No Known Problems Mother     No Known Problems Sister     Leukemia Paternal Grandmother     Breast cancer Paternal Aunt 40    Ovarian cancer Neg Hx     Uterine cancer Neg Hx     Colon cancer Neg Hx     Melanoma Neg Hx     Prostate cancer Neg Hx      Review of Systems   Constitutional:  Negative for activity change and unexpected weight change.   Respiratory:  Negative for shortness of breath.    Cardiovascular:  Negative for chest pain.   Gastrointestinal:  Negative for abdominal pain.   Genitourinary:  Positive for menstrual problem. Negative for pelvic pain.         Objective   /78 (BP Location: Left arm, Patient Position: Sitting, Cuff Size: Adult)   Ht 157.5 cm (62\")   Wt 55.3 kg (122 lb)   LMP 09/08/2024 (Exact Date)   Breastfeeding No   BMI 22.31 kg/m²     Physical Exam   Physical Exam  Vitals and nursing note reviewed.   Constitutional:       General: She is not in acute distress.     Appearance: She is well-developed.   HENT:      Head: Normocephalic and atraumatic.   Neck:      Thyroid: No thyromegaly.   Pulmonary:      Effort: Pulmonary effort is normal.   Abdominal:      General: There is no distension.      Palpations: Abdomen is soft.      Tenderness: There is no abdominal tenderness.   Musculoskeletal:         General: Normal range of motion.      Cervical back: Normal range of motion.   Skin:     General: Skin is warm and dry.   Neurological:      Mental Status: She is alert and oriented to person, place, and time.   Psychiatric:         Mood and Affect: Mood normal.         Behavior: Behavior normal.         Thought " Content: Thought content normal.         Judgment: Judgment normal.         Labs  Lab Results   Component Value Date     11/02/2023    HGB 12.5 11/02/2023    HCT 40.0 11/02/2023    WBC 4.98 11/02/2023     11/02/2023    K 4.6 11/02/2023     11/02/2023    CO2 26.0 11/02/2023    BUN 10 11/02/2023    CREATININE 0.75 11/02/2023    GLUCOSE 90 11/02/2023    ALBUMIN 4.6 11/02/2023    CALCIUM 9.4 11/02/2023    AST 16 11/02/2023    ALT 9 11/02/2023    BILITOT 0.4 11/02/2023       Final Diagnosis   Endometrium, biopsy:  -Secretory endometrium (day 20).  -No evidence of complex hyperplasia, atypia, or malignancy.   Electronically signed by Kehr, Elizabeth L, MD on 11/14/2023 at 1142     Impression:     1.  Uterus: Enlarged, with uterine volume of 412 ml, and Anteverted     2.  Endometrium:  17 mm      3.  Myometrium:  Large 8.8 cm posterior fundal fibroid     4.  Ovaries  Left:    Normal/unremarkable   Right:  Normal/unremarkable         Relevant comparison data: No relevant comparison data     Colin Reyes MD  11/2/2023 17:33 CDT     Assessment & Plan    1. Menorrhagia with regular cycle (Primary): We reviewed options for the patient's menorrhagia to include Mirena IUD versus da Stevan assisted laparoscopic hysterectomy at her previous visit.  The patient is not a great candidate for combination OCPs since she has migraine with aura and already knows that this increases her of CVA.  She is also not a great candidate for an endometrial ablation since her large fibroid may diminish the improvement she gets from seizure.  The Mirena brochure was reviewed with the patient and all of her questions answered.  We have also discussed laparoscopic hysterectomy, including all of the surgical risks associated with it.  Additionally, we have discussed the patient's family history of breast cancer, with her and having a diagnosis of breast cancer at only 38 years old.  The patient feels like hysterectomy is the  best choice for her, and would like to have her uterus, cervix, fallopian tubes, and ovaries all removed.  Risks of laparoscopic surgery were reviewed to include, but are not limited to, the possibility of bowel perforation requiring possible bowel resection and/or colostomy, possible bladder injury or possible and possible vascular injury which could require the need for a blood transfusion.  Possible need for conversion to a laparotomy was also discussed.  The patient's questions were answered to her satisfaction.  Post-op restrictions and typical post-op course were also reviewed.   -     Case Request; Standing  -     CBC and Differential; Future  -     ECG 12 Lead; Future  -     sodium chloride 0.9 % flush 10 mL  -     sodium chloride 0.9 % flush 10 mL  -     sodium chloride 0.9 % infusion 40 mL  -     phenazopyridine (PYRIDIUM) tablet 200 mg  -     ceFAZolin (ANCEF) 2,000 mg in sodium chloride 0.9 % 100 mL IVPB  -     acetaminophen (TYLENOL) tablet 1,000 mg  -     gabapentin (NEURONTIN) capsule 600 mg  -     scopolamine patch 1 mg/72 hr  -     Case Request    2. Family history of breast cancer: Patient did not ever get BRCA testing since her insurance did not cover it.  She has decided that she would like to go ahead and have her ovaries removed and lieu of BRCA testing since she is already within a few years of being menopausal.  The patient understands that she will be surgically menopausal and that HRT will be started if symptomatically indicated.    Other orders  -     Code Status and Medical Interventions:; Standing  -     Follow Anesthesia Guidelines / Protocol; Future  -     Follow Anesthesia Guidelines / Protocol; Standing  -     Chlorhexidine Skin Prep; Future  -     Verify / Perform Chlorhexidine Skin Prep; Standing  -     Type & Screen; Standing  -     Insert Peripheral IV; Standing  -     Saline Lock & Maintain IV Access; Standing  -     Place Sequential Compression Device; Standing  -     Maintain  Sequential Compression Device; Standing  -     Obtain Informed Consent; Standing  -     Verify NPO Status; Standing         Kathy Marie MD  9/10/2024  09:53 CDT

## 2024-10-07 ENCOUNTER — PRE-ADMISSION TESTING (OUTPATIENT)
Dept: PREADMISSION TESTING | Facility: HOSPITAL | Age: 44
End: 2024-10-07
Payer: COMMERCIAL

## 2024-10-07 VITALS
OXYGEN SATURATION: 99 % | WEIGHT: 124.78 LBS | HEIGHT: 62 IN | DIASTOLIC BLOOD PRESSURE: 88 MMHG | HEART RATE: 83 BPM | RESPIRATION RATE: 20 BRPM | BODY MASS INDEX: 22.96 KG/M2 | SYSTOLIC BLOOD PRESSURE: 138 MMHG

## 2024-10-07 DIAGNOSIS — N92.0 MENORRHAGIA WITH REGULAR CYCLE: ICD-10-CM

## 2024-10-07 PROCEDURE — 85025 COMPLETE CBC W/AUTO DIFF WBC: CPT | Performed by: OBSTETRICS & GYNECOLOGY

## 2024-10-07 PROCEDURE — 93005 ELECTROCARDIOGRAM TRACING: CPT

## 2024-10-07 NOTE — DISCHARGE INSTRUCTIONS
Preparing for Surgery  Follow these instructions before the procedure:  Several days or weeks before your procedure      Ask your health care provider about:  Changing or stopping your regular medicines. This is especially important if you are taking diabetes medicines or blood thinners.  Taking medicines such as aspirin and ibuprofen. These medicines can thin your blood. Do not take these medicines unless your health care provider tells you to take them.  Taking over-the-counter medicines, vitamins, herbs, and supplements.    Contact your surgeon if you:  Develop a fever of more than 100.4°F (38°C) or other feelings of illness during the 48 hours before your surgery.  Have symptoms that get worse.  Have questions or concerns about your surgery.  If you are going home the same day of your surgery you will need to arrange for a responsible adult, age 18 years old or older, to drive you home from the hospital and stay with you for 24 hours. Verification of the  will be made prior to any procedure requiring sedation. You may not go home in a taxi or any form of public transportation by yourself.     Day before your procedure    24 hours before your procedure DO NOT drink alcoholic beverages or smoke.  24 hours before your procedure STOP taking Erectile Dysfunction medication (i.e.,Cialis, Viagra)   You may be asked to shower with a germ-killing soap.  Day of your procedure   You may take the following medication(s) the morning of surgery with a sip of water: AS DIRECTED BY YOUR DOCTOR      8 hours before your scheduled arrival time, STOP all food, any dairy products, and full liquids. This includes hard candy, chewing gum or mints. This is extremely important to prevent serious complications.     Up to 2 hours before your scheduled arrival time, you may have clear liquids no cream, powder, or pulp of any kind. Safe options are water, black coffee, plain tea, soda, Gatorade/Powerade, clear broth, apple juice.    2  hours before your scheduled arrival time, STOP drinking clear liquids.    You may need to take another shower with a germ-killing soap before you leave home in the morning. Do not use perfumes, colognes, or body lotions.  Wear comfortable loose-fitting clothing.  Remove all jewelry including body piercing and rings, dark colored nail polish, and make up prior to arrival at the hospital. Leave all valuables at home.   Bring your hearing aids if you rely on them.  Do not wear contact lenses. If you wear eyeglasses remember to bring a case to store them in while you are in surgery.  Do not use denture adhesives since you will be asked to remove them during your surgery.    You do not need to bring your home medications into the hospital.   Bring your sleep apnea device with you on the day of your surgery (if this applies to you).  If you wear portable oxygen, bring it with you.   If you are staying overnight, you may bring a bag of items you may need such as slippers, robe and a change of clothes for your discharge. You may want to leave these items in the car until you are ready for them since your family will take your belongings when you leave the pre-operative area.  Arrive at the hospital as scheduled by the office. You will be asked to arrive 2 hours prior to your surgery time in order to prepare for your procedure.  When you arrive at the hospital  Go to the registration desk located at the main entrance of the hospital.  After registration is completed, you will be given a beeper and a sticker sheet. Take the stickers to Outpatient Surgery and place in the tray at the end of the desk to notify the staff that you have arrived and registered.   Return to the lobby to wait. You are not always called back according to the time of arrival but rather the time your doctor will be ready.  When your beeper lights up and vibrates proceed through the double doors, under the stairs, and a member of the Outpatient Surgery  staff will escort you to your preoperative room.     How to Use Chlorhexidine Before Surgery  Chlorhexidine gluconate (CHG) is a germ-killing (antiseptic) solution that is used to clean the skin. It can get rid of the bacteria that normally live on the skin and can keep them away for about 24 hours. To clean your skin with CHG, you may be given:  A CHG solution to use in the shower or as part of a sponge bath.  A prepackaged cloth that contains CHG.  Cleaning your skin with CHG may help lower the risk for infection:  While you are staying in the intensive care unit of the hospital.  If you have a vascular access, such as a central line, to provide short-term or long-term access to your veins.  If you have a catheter to drain urine from your bladder.  If you are on a ventilator. A ventilator is a machine that helps you breathe by moving air in and out of your lungs.  After surgery.  What are the risks?  Risks of using CHG include:  A skin reaction.  Hearing loss, if CHG gets in your ears and you have a perforated eardrum.  Eye injury, if CHG gets in your eyes and is not rinsed out.  The CHG product catching fire.  Make sure that you avoid smoking and flames after applying CHG to your skin.  Do not use CHG:  If you have a chlorhexidine allergy or have previously reacted to chlorhexidine.  On babies younger than 2 months of age.  How to use CHG solution  Use CHG only as told by your health care provider, and follow the instructions on the label.  Use the full amount of CHG as directed. Usually, this is one bottle.  During a shower    Follow these steps when using CHG solution during a shower (unless your health care provider gives you different instructions):  Start the shower.  Use your normal soap and shampoo to wash your face and hair.  Turn off the shower or move out of the shower stream.  Pour the CHG onto a clean washcloth. Do not use any type of brush or rough-edged sponge.  Starting at your neck, lather your  body down to your toes. Make sure you follow these instructions:  If you will be having surgery, pay special attention to the part of your body where you will be having surgery. Scrub this area for at least 1 minute.  Do not use CHG on your head or face. If the solution gets into your ears or eyes, rinse them well with water.  Avoid your genital area.  Avoid any areas of skin that have broken skin, cuts, or scrapes.  Scrub your back and under your arms. Make sure to wash skin folds.  Let the lather sit on your skin for 1-2 minutes or as long as told by your health care provider.  Thoroughly rinse your entire body in the shower. Make sure that all body creases and crevices are rinsed well.  Dry off with a clean towel. Do not put any substances on your body afterward--such as powder, lotion, or perfume--unless you are told to do so by your health care provider. Only use lotions that are recommended by the .  Put on clean clothes or pajamas.  If it is the night before your surgery, sleep in clean sheets.     During a sponge bath  Follow these steps when using CHG solution during a sponge bath (unless your health care provider gives you different instructions):  Use your normal soap and shampoo to wash your face and hair.  Pour the CHG onto a clean washcloth.  Starting at your neck, lather your body down to your toes. Make sure you follow these instructions:  If you will be having surgery, pay special attention to the part of your body where you will be having surgery. Scrub this area for at least 1 minute.  Do not use CHG on your head or face. If the solution gets into your ears or eyes, rinse them well with water.  Avoid your genital area.  Avoid any areas of skin that have broken skin, cuts, or scrapes.  Scrub your back and under your arms. Make sure to wash skin folds.  Let the lather sit on your skin for 1-2 minutes or as long as told by your health care provider.  Using a different clean, wet  washcloth, thoroughly rinse your entire body. Make sure that all body creases and crevices are rinsed well.  Dry off with a clean towel. Do not put any substances on your body afterward--such as powder, lotion, or perfume--unless you are told to do so by your health care provider. Only use lotions that are recommended by the .  Put on clean clothes or pajamas.  If it is the night before your surgery, sleep in clean sheets.  How to use CHG prepackaged cloths  Only use CHG cloths as told by your health care provider, and follow the instructions on the label.  Use the CHG cloth on clean, dry skin.  Do not use the CHG cloth on your head or face unless your health care provider tells you to.  When washing with the CHG cloth:  Avoid your genital area.  Avoid any areas of skin that have broken skin, cuts, or scrapes.  Before surgery    Follow these steps when using a CHG cloth to clean before surgery (unless your health care provider gives you different instructions):  Using the CHG cloth, vigorously scrub the part of your body where you will be having surgery. Scrub using a back-and-forth motion for 3 minutes. The area on your body should be completely wet with CHG when you are done scrubbing.  Do not rinse. Discard the cloth and let the area air-dry. Do not put any substances on the area afterward, such as powder, lotion, or perfume.  Put on clean clothes or pajamas.  If it is the night before your surgery, sleep in clean sheets.     For general bathing  Follow these steps when using CHG cloths for general bathing (unless your health care provider gives you different instructions).  Use a separate CHG cloth for each area of your body. Make sure you wash between any folds of skin and between your fingers and toes. Wash your body in the following order, switching to a new cloth after each step:  The front of your neck, shoulders, and chest.  Both of your arms, under your arms, and your hands.  Your stomach and  groin area, avoiding the genitals.  Your right leg and foot.  Your left leg and foot.  The back of your neck, your back, and your buttocks.  Do not rinse. Discard the cloth and let the area air-dry. Do not put any substances on your body afterward--such as powder, lotion, or perfume--unless you are told to do so by your health care provider. Only use lotions that are recommended by the .  Put on clean clothes or pajamas.  Contact a health care provider if:  Your skin gets irritated after scrubbing.  You have questions about using your solution or cloth.  You swallow any chlorhexidine. Call your local poison control center (1-911.666.3434 in the [place]  Get help right away if:  Your eyes itch badly, or they become very red or swollen.  Your skin itches badly and is red or swollen.  Your hearing changes.  You have trouble seeing.  You have swelling or tingling in your mouth or throat.  You have trouble breathing.  These symptoms may represent a serious problem that is an emergency. Do not wait to see if the symptoms will go away. Get medical help right away. Call your local emergency services (421 in the [place] Do not drive yourself to the hospital.  Summary  Chlorhexidine gluconate (CHG) is a germ-killing (antiseptic) solution that is used to clean the skin. Cleaning your skin with CHG may help to lower your risk for infection.  You may be given CHG to use for bathing. It may be in a bottle or in a prepackaged cloth to use on your skin. Carefully follow your health care provider's instructions and the instructions on the product label.  Do not use CHG if you have a chlorhexidine allergy.  Contact your health care provider if your skin gets irritated after scrubbing.  This information is not intended to replace advice given to you by your health care provider. Make sure you discuss any questions you have with your health care provider.  Document Revised: 04/17/2023 Document Reviewed: 02/28/2022  Zhao  Patient Education © 2023 [employer]

## 2024-10-11 LAB
QT INTERVAL: 386 MS
QTC INTERVAL: 440 MS

## 2024-10-21 ENCOUNTER — HOSPITAL ENCOUNTER (OUTPATIENT)
Facility: HOSPITAL | Age: 44
Setting detail: HOSPITAL OUTPATIENT SURGERY
Discharge: HOME | End: 2024-10-21
Attending: OBSTETRICS & GYNECOLOGY | Admitting: OBSTETRICS & GYNECOLOGY
Payer: COMMERCIAL

## 2024-10-21 ENCOUNTER — ANESTHESIA (OUTPATIENT)
Dept: PERIOP | Facility: HOSPITAL | Age: 44
End: 2024-10-21
Payer: COMMERCIAL

## 2024-10-21 ENCOUNTER — ANESTHESIA EVENT (OUTPATIENT)
Dept: PERIOP | Facility: HOSPITAL | Age: 44
End: 2024-10-21
Payer: COMMERCIAL

## 2024-10-21 VITALS
OXYGEN SATURATION: 99 % | HEART RATE: 78 BPM | DIASTOLIC BLOOD PRESSURE: 81 MMHG | RESPIRATION RATE: 16 BRPM | TEMPERATURE: 98.6 F | SYSTOLIC BLOOD PRESSURE: 120 MMHG

## 2024-10-21 DIAGNOSIS — Z90.710 S/P LAPAROSCOPIC HYSTERECTOMY: Primary | ICD-10-CM

## 2024-10-21 DIAGNOSIS — N92.0 MENORRHAGIA WITH REGULAR CYCLE: ICD-10-CM

## 2024-10-21 LAB
ABO GROUP BLD: NORMAL
B-HCG UR QL: NEGATIVE
BLD GP AB SCN SERPL QL: NEGATIVE
RH BLD: POSITIVE
T&S EXPIRATION DATE: NORMAL

## 2024-10-21 PROCEDURE — 88307 TISSUE EXAM BY PATHOLOGIST: CPT | Performed by: OBSTETRICS & GYNECOLOGY

## 2024-10-21 PROCEDURE — 25010000002 CEFAZOLIN PER 500 MG: Performed by: OBSTETRICS & GYNECOLOGY

## 2024-10-21 PROCEDURE — 81025 URINE PREGNANCY TEST: CPT | Performed by: OBSTETRICS & GYNECOLOGY

## 2024-10-21 PROCEDURE — 25010000002 DROPERIDOL PER 5 MG: Performed by: ANESTHESIOLOGY

## 2024-10-21 PROCEDURE — 25010000002 FENTANYL CITRATE (PF) 50 MCG/ML SOLUTION: Performed by: ANESTHESIOLOGY

## 2024-10-21 PROCEDURE — 25010000002 FUROSEMIDE PER 20 MG

## 2024-10-21 PROCEDURE — 25010000002 ONDANSETRON PER 1 MG: Performed by: ANESTHESIOLOGY

## 2024-10-21 PROCEDURE — 25010000002 PROPOFOL 10 MG/ML EMULSION

## 2024-10-21 PROCEDURE — 25010000002 GLYCOPYRROLATE 0.4 MG/2ML SOLUTION

## 2024-10-21 PROCEDURE — 25810000003 SODIUM CHLORIDE PER 500 ML: Performed by: OBSTETRICS & GYNECOLOGY

## 2024-10-21 PROCEDURE — 25810000003 LACTATED RINGERS PER 1000 ML: Performed by: OBSTETRICS & GYNECOLOGY

## 2024-10-21 PROCEDURE — 86900 BLOOD TYPING SEROLOGIC ABO: CPT | Performed by: ANESTHESIOLOGY

## 2024-10-21 PROCEDURE — 25010000002 LIDOCAINE PF 2% 2 % SOLUTION

## 2024-10-21 PROCEDURE — 25010000002 FENTANYL CITRATE (PF) 100 MCG/2ML SOLUTION

## 2024-10-21 PROCEDURE — 25010000002 ONDANSETRON PER 1 MG

## 2024-10-21 PROCEDURE — 25010000002 HYDROMORPHONE 1 MG/ML SOLUTION

## 2024-10-21 PROCEDURE — 25010000002 DEXAMETHASONE PER 1 MG

## 2024-10-21 PROCEDURE — 86850 RBC ANTIBODY SCREEN: CPT | Performed by: ANESTHESIOLOGY

## 2024-10-21 PROCEDURE — 25010000002 MIDAZOLAM PER 1MG: Performed by: ANESTHESIOLOGY

## 2024-10-21 PROCEDURE — 86901 BLOOD TYPING SEROLOGIC RH(D): CPT | Performed by: ANESTHESIOLOGY

## 2024-10-21 PROCEDURE — 25010000002 DEXAMETHASONE PER 1 MG: Performed by: ANESTHESIOLOGY

## 2024-10-21 PROCEDURE — 25010000002 SUGAMMADEX 200 MG/2ML SOLUTION

## 2024-10-21 DEVICE — HEMOST ABS SURGICEL PWDR 3GM: Type: IMPLANTABLE DEVICE | Site: ABDOMEN | Status: FUNCTIONAL

## 2024-10-21 DEVICE — DEV WND/CLS CONTRL TISS STRATAFIX SPIRAL PDO 2/0 SH 14X14: Type: IMPLANTABLE DEVICE | Site: ABDOMEN | Status: FUNCTIONAL

## 2024-10-21 RX ORDER — SODIUM CHLORIDE, SODIUM LACTATE, POTASSIUM CHLORIDE, CALCIUM CHLORIDE 600; 310; 30; 20 MG/100ML; MG/100ML; MG/100ML; MG/100ML
1000 INJECTION, SOLUTION INTRAVENOUS CONTINUOUS
Status: DISCONTINUED | OUTPATIENT
Start: 2024-10-21 | End: 2024-10-21 | Stop reason: HOSPADM

## 2024-10-21 RX ORDER — SCOLOPAMINE TRANSDERMAL SYSTEM 1 MG/1
1 PATCH, EXTENDED RELEASE TRANSDERMAL ONCE
Status: DISCONTINUED | OUTPATIENT
Start: 2024-10-21 | End: 2024-10-21 | Stop reason: HOSPADM

## 2024-10-21 RX ORDER — DROPERIDOL 2.5 MG/ML
0.62 INJECTION, SOLUTION INTRAMUSCULAR; INTRAVENOUS ONCE AS NEEDED
Status: COMPLETED | OUTPATIENT
Start: 2024-10-21 | End: 2024-10-21

## 2024-10-21 RX ORDER — SODIUM CHLORIDE 0.9 % (FLUSH) 0.9 %
3-10 SYRINGE (ML) INJECTION AS NEEDED
Status: DISCONTINUED | OUTPATIENT
Start: 2024-10-21 | End: 2024-10-21 | Stop reason: HOSPADM

## 2024-10-21 RX ORDER — SODIUM CHLORIDE 0.9 % (FLUSH) 0.9 %
3 SYRINGE (ML) INJECTION AS NEEDED
Status: DISCONTINUED | OUTPATIENT
Start: 2024-10-21 | End: 2024-10-21 | Stop reason: HOSPADM

## 2024-10-21 RX ORDER — FENTANYL CITRATE 50 UG/ML
INJECTION, SOLUTION INTRAMUSCULAR; INTRAVENOUS AS NEEDED
Status: DISCONTINUED | OUTPATIENT
Start: 2024-10-21 | End: 2024-10-21 | Stop reason: SURG

## 2024-10-21 RX ORDER — LABETALOL HYDROCHLORIDE 5 MG/ML
5 INJECTION, SOLUTION INTRAVENOUS
Status: DISCONTINUED | OUTPATIENT
Start: 2024-10-21 | End: 2024-10-21 | Stop reason: HOSPADM

## 2024-10-21 RX ORDER — ONDANSETRON 2 MG/ML
4 INJECTION INTRAMUSCULAR; INTRAVENOUS ONCE AS NEEDED
Status: COMPLETED | OUTPATIENT
Start: 2024-10-21 | End: 2024-10-21

## 2024-10-21 RX ORDER — IBUPROFEN 600 MG/1
600 TABLET, FILM COATED ORAL EVERY 6 HOURS PRN
Status: DISCONTINUED | OUTPATIENT
Start: 2024-10-21 | End: 2024-10-21 | Stop reason: HOSPADM

## 2024-10-21 RX ORDER — FENTANYL CITRATE 50 UG/ML
50 INJECTION, SOLUTION INTRAMUSCULAR; INTRAVENOUS
Status: COMPLETED | OUTPATIENT
Start: 2024-10-21 | End: 2024-10-21

## 2024-10-21 RX ORDER — OXYCODONE AND ACETAMINOPHEN 5; 325 MG/1; MG/1
1 TABLET ORAL EVERY 6 HOURS PRN
Qty: 12 TABLET | Refills: 0 | Status: SHIPPED | OUTPATIENT
Start: 2024-10-21 | End: 2024-10-27 | Stop reason: HOSPADM

## 2024-10-21 RX ORDER — HYDROMORPHONE HYDROCHLORIDE 1 MG/ML
0.5 INJECTION, SOLUTION INTRAMUSCULAR; INTRAVENOUS; SUBCUTANEOUS
Status: DISCONTINUED | OUTPATIENT
Start: 2024-10-21 | End: 2024-10-21 | Stop reason: HOSPADM

## 2024-10-21 RX ORDER — ROCURONIUM BROMIDE 10 MG/ML
INJECTION, SOLUTION INTRAVENOUS AS NEEDED
Status: DISCONTINUED | OUTPATIENT
Start: 2024-10-21 | End: 2024-10-21 | Stop reason: SURG

## 2024-10-21 RX ORDER — FLUMAZENIL 0.1 MG/ML
0.2 INJECTION INTRAVENOUS AS NEEDED
Status: DISCONTINUED | OUTPATIENT
Start: 2024-10-21 | End: 2024-10-21 | Stop reason: HOSPADM

## 2024-10-21 RX ORDER — SODIUM CHLORIDE 0.9 % (FLUSH) 0.9 %
3 SYRINGE (ML) INJECTION EVERY 12 HOURS SCHEDULED
Status: DISCONTINUED | OUTPATIENT
Start: 2024-10-21 | End: 2024-10-21 | Stop reason: HOSPADM

## 2024-10-21 RX ORDER — SODIUM CHLORIDE 9 MG/ML
INJECTION, SOLUTION INTRAVENOUS AS NEEDED
Status: DISCONTINUED | OUTPATIENT
Start: 2024-10-21 | End: 2024-10-21 | Stop reason: HOSPADM

## 2024-10-21 RX ORDER — OXYCODONE AND ACETAMINOPHEN 10; 325 MG/1; MG/1
1 TABLET ORAL EVERY 4 HOURS PRN
Status: DISCONTINUED | OUTPATIENT
Start: 2024-10-21 | End: 2024-10-21 | Stop reason: HOSPADM

## 2024-10-21 RX ORDER — PROPOFOL 10 MG/ML
VIAL (ML) INTRAVENOUS AS NEEDED
Status: DISCONTINUED | OUTPATIENT
Start: 2024-10-21 | End: 2024-10-21 | Stop reason: SURG

## 2024-10-21 RX ORDER — SODIUM CHLORIDE 0.9 % (FLUSH) 0.9 %
10 SYRINGE (ML) INJECTION AS NEEDED
Status: DISCONTINUED | OUTPATIENT
Start: 2024-10-21 | End: 2024-10-21 | Stop reason: HOSPADM

## 2024-10-21 RX ORDER — LIDOCAINE HYDROCHLORIDE 10 MG/ML
0.5 INJECTION, SOLUTION EPIDURAL; INFILTRATION; INTRACAUDAL; PERINEURAL ONCE AS NEEDED
Status: DISCONTINUED | OUTPATIENT
Start: 2024-10-21 | End: 2024-10-21 | Stop reason: HOSPADM

## 2024-10-21 RX ORDER — GLYCOPYRROLATE 0.2 MG/ML
INJECTION INTRAMUSCULAR; INTRAVENOUS AS NEEDED
Status: DISCONTINUED | OUTPATIENT
Start: 2024-10-21 | End: 2024-10-21 | Stop reason: SURG

## 2024-10-21 RX ORDER — DEXAMETHASONE SODIUM PHOSPHATE 4 MG/ML
4 INJECTION, SOLUTION INTRA-ARTICULAR; INTRALESIONAL; INTRAMUSCULAR; INTRAVENOUS; SOFT TISSUE ONCE AS NEEDED
Status: COMPLETED | OUTPATIENT
Start: 2024-10-21 | End: 2024-10-21

## 2024-10-21 RX ORDER — NEOSTIGMINE METHYLSULFATE 5 MG/5 ML
SYRINGE (ML) INTRAVENOUS AS NEEDED
Status: DISCONTINUED | OUTPATIENT
Start: 2024-10-21 | End: 2024-10-21 | Stop reason: SURG

## 2024-10-21 RX ORDER — DEXAMETHASONE SODIUM PHOSPHATE 4 MG/ML
INJECTION, SOLUTION INTRA-ARTICULAR; INTRALESIONAL; INTRAMUSCULAR; INTRAVENOUS; SOFT TISSUE AS NEEDED
Status: DISCONTINUED | OUTPATIENT
Start: 2024-10-21 | End: 2024-10-21 | Stop reason: SURG

## 2024-10-21 RX ORDER — LIDOCAINE HYDROCHLORIDE 20 MG/ML
INJECTION, SOLUTION EPIDURAL; INFILTRATION; INTRACAUDAL; PERINEURAL AS NEEDED
Status: DISCONTINUED | OUTPATIENT
Start: 2024-10-21 | End: 2024-10-21 | Stop reason: SURG

## 2024-10-21 RX ORDER — SODIUM CHLORIDE, SODIUM LACTATE, POTASSIUM CHLORIDE, CALCIUM CHLORIDE 600; 310; 30; 20 MG/100ML; MG/100ML; MG/100ML; MG/100ML
100 INJECTION, SOLUTION INTRAVENOUS CONTINUOUS
Status: DISCONTINUED | OUTPATIENT
Start: 2024-10-21 | End: 2024-10-21 | Stop reason: HOSPADM

## 2024-10-21 RX ORDER — ONDANSETRON 2 MG/ML
4 INJECTION INTRAMUSCULAR; INTRAVENOUS
Status: DISCONTINUED | OUTPATIENT
Start: 2024-10-21 | End: 2024-10-21 | Stop reason: HOSPADM

## 2024-10-21 RX ORDER — ACETAMINOPHEN 500 MG
1000 TABLET ORAL ONCE
Status: COMPLETED | OUTPATIENT
Start: 2024-10-21 | End: 2024-10-21

## 2024-10-21 RX ORDER — NALOXONE HCL 0.4 MG/ML
0.04 VIAL (ML) INJECTION AS NEEDED
Status: DISCONTINUED | OUTPATIENT
Start: 2024-10-21 | End: 2024-10-21 | Stop reason: HOSPADM

## 2024-10-21 RX ORDER — MIDAZOLAM HYDROCHLORIDE 2 MG/2ML
2 INJECTION, SOLUTION INTRAMUSCULAR; INTRAVENOUS
Status: DISCONTINUED | OUTPATIENT
Start: 2024-10-21 | End: 2024-10-21 | Stop reason: HOSPADM

## 2024-10-21 RX ORDER — PHENAZOPYRIDINE HYDROCHLORIDE 200 MG/1
200 TABLET, FILM COATED ORAL ONCE
Status: COMPLETED | OUTPATIENT
Start: 2024-10-21 | End: 2024-10-21

## 2024-10-21 RX ORDER — FUROSEMIDE 10 MG/ML
INJECTION INTRAMUSCULAR; INTRAVENOUS AS NEEDED
Status: DISCONTINUED | OUTPATIENT
Start: 2024-10-21 | End: 2024-10-21 | Stop reason: SURG

## 2024-10-21 RX ORDER — GABAPENTIN 250 MG/5ML
600 SOLUTION ORAL ONCE
Status: COMPLETED | OUTPATIENT
Start: 2024-10-21 | End: 2024-10-21

## 2024-10-21 RX ORDER — ONDANSETRON 2 MG/ML
INJECTION INTRAMUSCULAR; INTRAVENOUS AS NEEDED
Status: DISCONTINUED | OUTPATIENT
Start: 2024-10-21 | End: 2024-10-21 | Stop reason: SURG

## 2024-10-21 RX ADMIN — HYDROMORPHONE HYDROCHLORIDE 1 MG: 1 INJECTION, SOLUTION INTRAMUSCULAR; INTRAVENOUS; SUBCUTANEOUS at 09:51

## 2024-10-21 RX ADMIN — OXYCODONE AND ACETAMINOPHEN 1 TABLET: 325; 10 TABLET ORAL at 10:31

## 2024-10-21 RX ADMIN — CEFAZOLIN 2000 MG: 2 INJECTION, POWDER, FOR SOLUTION INTRAMUSCULAR; INTRAVENOUS at 08:05

## 2024-10-21 RX ADMIN — FENTANYL CITRATE 50 MCG: 50 INJECTION, SOLUTION INTRAMUSCULAR; INTRAVENOUS at 10:33

## 2024-10-21 RX ADMIN — PROPOFOL 200 MG: 10 INJECTION, EMULSION INTRAVENOUS at 08:00

## 2024-10-21 RX ADMIN — DROPERIDOL 0.62 MG: 2.5 INJECTION, SOLUTION INTRAMUSCULAR; INTRAVENOUS at 10:28

## 2024-10-21 RX ADMIN — DEXAMETHASONE SODIUM PHOSPHATE 4 MG: 4 INJECTION INTRA-ARTICULAR; INTRALESIONAL; INTRAMUSCULAR; INTRAVENOUS; SOFT TISSUE at 09:50

## 2024-10-21 RX ADMIN — GLYCOPYRROLATE 0.4 MG: 0.2 INJECTION INTRAMUSCULAR; INTRAVENOUS at 09:55

## 2024-10-21 RX ADMIN — ROCURONIUM BROMIDE 20 MG: 10 INJECTION, SOLUTION INTRAVENOUS at 09:33

## 2024-10-21 RX ADMIN — FENTANYL CITRATE 50 MCG: 50 INJECTION, SOLUTION INTRAMUSCULAR; INTRAVENOUS at 10:45

## 2024-10-21 RX ADMIN — ONDANSETRON 4 MG: 2 INJECTION INTRAMUSCULAR; INTRAVENOUS at 09:50

## 2024-10-21 RX ADMIN — SODIUM CHLORIDE, POTASSIUM CHLORIDE, SODIUM LACTATE AND CALCIUM CHLORIDE: 600; 310; 30; 20 INJECTION, SOLUTION INTRAVENOUS at 07:58

## 2024-10-21 RX ADMIN — LIDOCAINE HYDROCHLORIDE 80 MG: 20 INJECTION, SOLUTION EPIDURAL; INFILTRATION; INTRACAUDAL; PERINEURAL at 08:00

## 2024-10-21 RX ADMIN — SODIUM CHLORIDE, POTASSIUM CHLORIDE, SODIUM LACTATE AND CALCIUM CHLORIDE 1000 ML: 600; 310; 30; 20 INJECTION, SOLUTION INTRAVENOUS at 10:50

## 2024-10-21 RX ADMIN — SCOPALAMINE 1 PATCH: 1 PATCH, EXTENDED RELEASE TRANSDERMAL at 07:48

## 2024-10-21 RX ADMIN — PHENAZOPYRIDINE HYDROCHLORIDE 200 MG: 200 TABLET ORAL at 07:52

## 2024-10-21 RX ADMIN — ONDANSETRON 4 MG: 2 INJECTION INTRAMUSCULAR; INTRAVENOUS at 07:48

## 2024-10-21 RX ADMIN — MIDAZOLAM HYDROCHLORIDE 2 MG: 1 INJECTION, SOLUTION INTRAMUSCULAR; INTRAVENOUS at 07:49

## 2024-10-21 RX ADMIN — FUROSEMIDE 20 MG: 10 INJECTION, SOLUTION INTRAVENOUS at 09:51

## 2024-10-21 RX ADMIN — ROCURONIUM BROMIDE 50 MG: 10 INJECTION, SOLUTION INTRAVENOUS at 08:00

## 2024-10-21 RX ADMIN — SUGAMMADEX 200 MG: 100 INJECTION, SOLUTION INTRAVENOUS at 10:00

## 2024-10-21 RX ADMIN — DEXAMETHASONE SODIUM PHOSPHATE 4 MG: 4 INJECTION, SOLUTION INTRA-ARTICULAR; INTRALESIONAL; INTRAMUSCULAR; INTRAVENOUS; SOFT TISSUE at 07:49

## 2024-10-21 RX ADMIN — ACETAMINOPHEN 1000 MG: 500 TABLET, FILM COATED ORAL at 07:48

## 2024-10-21 RX ADMIN — GABAPENTIN 600 MG: 250 SOLUTION ORAL at 07:52

## 2024-10-21 RX ADMIN — Medication 4 MG: at 09:55

## 2024-10-21 RX ADMIN — FENTANYL CITRATE 100 MCG: 50 INJECTION, SOLUTION INTRAMUSCULAR; INTRAVENOUS at 07:59

## 2024-10-21 NOTE — ANESTHESIA PROCEDURE NOTES
Airway  Date/Time: 10/21/2024 8:01 AM  Airway not difficult    General Information and Staff    Patient location during procedure: OR  CRNA/CAA: Smita Cox CRNA    Indications and Patient Condition  Indications for airway management: airway protection    Preoxygenated: yes  Mask difficulty assessment: 1 - vent by mask    Final Airway Details  Final airway type: endotracheal airway      Successful airway: ETT  Cuffed: yes   Successful intubation technique: direct laryngoscopy  Facilitating devices/methods: intubating stylet  Endotracheal tube insertion site: oral  Blade: Morales  Blade size: 2  ETT size (mm): 7.0  Cormack-Lehane Classification: grade IIb - view of arytenoids or posterior of glottis only  Placement verified by: chest auscultation and capnometry   Cuff volume (mL): 6  Measured from: teeth  ETT/EBT  to teeth (cm): 21  Number of attempts at approach: 1  Assessment: lips, teeth, and gum same as pre-op and atraumatic intubation    Additional Comments  Small mouth opening

## 2024-10-21 NOTE — ANESTHESIA POSTPROCEDURE EVALUATION
Patient: Aubree Alegria    Procedure Summary       Date: 10/21/24 Room / Location: [facility] OR  /  [facility] OR    Anesthesia Start: 0758 Anesthesia Stop: 1010    Procedure: TOTAL LAPAROSCOPIC HYSTERECTOMY BILATERAL SALPINGOOPHORECTOMY WITH DAVINCI ROBOT (Bilateral: Abdomen) Diagnosis:       Menorrhagia with regular cycle      (Menorrhagia with regular cycle [N92.0])    Surgeons: Kathy Marie MD Provider: Smita Cox CRNA    Anesthesia Type: general ASA Status: 2            Anesthesia Type: general    Vitals  Vitals Value Taken Time   /77 10/21/24 1133   Temp 98.6 °F (37 °C) 10/21/24 1123   Pulse 65 10/21/24 1133   Resp 16 10/21/24 1133   SpO2 99 % 10/21/24 1133           Post Anesthesia Care and Evaluation    Patient location during evaluation: PACU  Patient participation: complete - patient participated  Level of consciousness: awake and alert  Pain management: adequate    Airway patency: patent  Anesthetic complications: No anesthetic complications    Cardiovascular status: acceptable  Respiratory status: acceptable  Hydration status: acceptable    Comments: Blood pressure 125/77, pulse 65, temperature 98.6 °F (37 °C), resp. rate 16, last menstrual period 10/07/2024, SpO2 99%, not currently breastfeeding.    Pt discharged from PACU based on tomy score >8

## 2024-10-21 NOTE — H&P
History and Physical        Subjective  Aubree Alegria is a 44 y.o. year old  who presents for consultation about menorrhagia.  Patient here  for pre-op consultation and to schedule a hysterectomy for treatment of her heavy periods.  We discussed the hysterectomy in May, but the patient did not want to do anything surgical during the summer months.  The patient had endometrial biopsy that was benign last  - she has been planning for a hysterectomy ever since that time.  We initially delayed scheduling her surgery at that time because the patient was going to have BRCA testing due to her family history.  The patient did not have insurance coverage for the test, and has simply decided to go ahead and have her ovaries removed since she is already close to being menopausal.  She has plans to start HRT following surgery, if she is symptomatic.     Menses are regular, but last 7 whole days.  She uses super tampons with an overnight pad as the backup, and will have to change both (pad completely saturated) about every 3 hours.  Sometimes menses includes large clots.  Aubree reports pelvic pressure that is painful, but denies cramping.     Patient has always been told she is not a pill candidate since the patient has auras with migraine.  Because of this, she has not really tried anything to improve her bleeding profile.     The patient already had a pelvic ultrasound and an endometrial biopsy as Michelle Larson was preparing the patient to see a physician.  Her endometrial biopsy was benign, and the transvaginal ultrasound revealed an 8.8 cm fundal fibroid (previously 6.6 cm 2 years ago).  Both of those studies are copied below.     Medical History   No past medical history on file.     Surgical History         Past Surgical History:   Procedure Laterality Date    APPENDECTOMY        BREAST BIOPSY Bilateral       benign     SECTION         x2    ECTOPIC PREGNANCY        TUBAL ABDOMINAL LIGATION        WISDOM  "TOOTH EXTRACTION               Tobacco Use History   Social History    Tobacco Use      Smoking status: Former      Smokeless tobacco: Never      Current Medications   No current outpatient medications on file.        Allergies        Allergies   Allergen Reactions    Ketorolac Tromethamine Hives    Tramadol Hives                  Family History   Problem Relation Age of Onset    Kidney cancer Maternal Aunt      Hypertension Father      No Known Problems Mother      No Known Problems Sister      Leukemia Paternal Grandmother      Breast cancer Paternal Aunt 40    Ovarian cancer Neg Hx      Uterine cancer Neg Hx      Colon cancer Neg Hx      Melanoma Neg Hx      Prostate cancer Neg Hx        Review of Systems   Constitutional:  Negative for activity change and unexpected weight change.   Respiratory:  Negative for shortness of breath.    Cardiovascular:  Negative for chest pain.   Gastrointestinal:  Negative for abdominal pain.   Genitourinary:  Positive for menstrual problem. Negative for pelvic pain.               Objective  /78 (BP Location: Left arm, Patient Position: Sitting, Cuff Size: Adult)   Ht 157.5 cm (62\")   Wt 55.3 kg (122 lb)   LMP 09/08/2024 (Exact Date)   Breastfeeding No   BMI 22.31 kg/m²      Physical Exam   Physical Exam  Vitals and nursing note reviewed.   Constitutional:       General: She is not in acute distress.     Appearance: She is well-developed.   HENT:      Head: Normocephalic and atraumatic.   Neck:      Thyroid: No thyromegaly.   Pulmonary:      Effort: Pulmonary effort is normal.   Abdominal:      General: There is no distension.      Palpations: Abdomen is soft.      Tenderness: There is no abdominal tenderness.   Musculoskeletal:         General: Normal range of motion.      Cervical back: Normal range of motion.   Skin:     General: Skin is warm and dry.   Neurological:      Mental Status: She is alert and oriented to person, place, and time.   Psychiatric:         Mood " and Affect: Mood normal.         Behavior: Behavior normal.         Thought Content: Thought content normal.         Judgment: Judgment normal.            Labs        Lab Results   Component Value Date      11/02/2023     HGB 12.5 11/02/2023     HCT 40.0 11/02/2023     WBC 4.98 11/02/2023      11/02/2023     K 4.6 11/02/2023      11/02/2023     CO2 26.0 11/02/2023     BUN 10 11/02/2023     CREATININE 0.75 11/02/2023     GLUCOSE 90 11/02/2023     ALBUMIN 4.6 11/02/2023     CALCIUM 9.4 11/02/2023     AST 16 11/02/2023     ALT 9 11/02/2023     BILITOT 0.4 11/02/2023         Final Diagnosis   Endometrium, biopsy:  -Secretory endometrium (day 20).  -No evidence of complex hyperplasia, atypia, or malignancy.   Electronically signed by Kehr, Elizabeth L, MD on 11/14/2023 at 1142      Impression:     1.  Uterus: Enlarged, with uterine volume of 412 ml, and Anteverted     2.  Endometrium:  17 mm      3.  Myometrium:  Large 8.8 cm posterior fundal fibroid     4.  Ovaries  Left:    Normal/unremarkable   Right:  Normal/unremarkable         Relevant comparison data: No relevant comparison data     Colin Reyes MD  11/2/2023 17:33 CDT                 Assessment & Plan     1. Menorrhagia with regular cycle (Primary): We reviewed options for the patient's menorrhagia to include Mirena IUD versus da Stevan assisted laparoscopic hysterectomy at her previous visit.  The patient is not a great candidate for combination OCPs since she has migraine with aura and already knows that this increases her of CVA.  She is also not a great candidate for an endometrial ablation since her large fibroid may diminish the improvement she gets from seizure.  The Mirena brochure was reviewed with the patient and all of her questions answered.  We have also discussed laparoscopic hysterectomy, including all of the surgical risks associated with it.  Additionally, we have discussed the patient's family history of breast cancer,  with her and having a diagnosis of breast cancer at only 38 years old.  The patient feels like hysterectomy is the best choice for her, and would like to have her uterus, cervix, fallopian tubes, and ovaries all removed.  Risks of laparoscopic surgery were reviewed to include, but are not limited to, the possibility of bowel perforation requiring possible bowel resection and/or colostomy, possible bladder injury or possible and possible vascular injury which could require the need for a blood transfusion.  Possible need for conversion to a laparotomy was also discussed.  The patient's questions were answered to her satisfaction.  Post-op restrictions and typical post-op course were also reviewed.   -     Case Request; Standing  -     CBC and Differential; Future  -     ECG 12 Lead; Future  -     sodium chloride 0.9 % flush 10 mL  -     sodium chloride 0.9 % flush 10 mL  -     sodium chloride 0.9 % infusion 40 mL  -     phenazopyridine (PYRIDIUM) tablet 200 mg  -     ceFAZolin (ANCEF) 2,000 mg in sodium chloride 0.9 % 100 mL IVPB  -     acetaminophen (TYLENOL) tablet 1,000 mg  -     gabapentin (NEURONTIN) capsule 600 mg  -     scopolamine patch 1 mg/72 hr  -     Case Request     2. Family history of breast cancer: Patient did not ever get BRCA testing since her insurance did not cover it.  She has decided that she would like to go ahead and have her ovaries removed and lieu of BRCA testing since she is already within a few years of being menopausal.  The patient understands that she will be surgically menopausal and that HRT will be started if symptomatically indicated.     Other orders  -     Code Status and Medical Interventions:; Standing  -     Follow Anesthesia Guidelines / Protocol; Future  -     Follow Anesthesia Guidelines / Protocol; Standing  -     Chlorhexidine Skin Prep; Future  -     Verify / Perform Chlorhexidine Skin Prep; Standing  -     Type & Screen; Standing  -     Insert Peripheral IV; Standing  -      Saline Lock & Maintain IV Access; Standing  -     Place Sequential Compression Device; Standing  -     Maintain Sequential Compression Device; Standing  -     Obtain Informed Consent; Standing  -     Verify NPO Status; Standing         Seen in holding area and denied any changes to her symptoms or status.  Procedure reviewed with patient, and she confirms her understanding that we will be removing her uterus, cervix, fallopian tubes, and ovaries.  The patient has no questions about scheduled procedure.    Kathy Marie MD  10/21/2024  07:51 CDT

## 2024-10-21 NOTE — ANESTHESIA PREPROCEDURE EVALUATION
Anesthesia Evaluation     Patient summary reviewed   no history of anesthetic complications:   NPO Solid Status: > 8 hours  NPO Liquid Status: > 8 hours           Airway   Mallampati: II  TM distance: >3 FB  Neck ROM: full  No difficulty expected  Dental - normal exam     Pulmonary    (+) a smoker,  Cardiovascular - negative cardio ROS  Exercise tolerance: excellent (>7 METS)        Neuro/Psych- negative ROS  GI/Hepatic/Renal/Endo - negative ROS     Musculoskeletal (-) negative ROS    Abdominal    Substance History      OB/GYN          Other                    Anesthesia Plan    ASA 2     general     intravenous induction     Anesthetic plan, risks, benefits, and alternatives have been provided, discussed and informed consent has been obtained with: patient.    CODE STATUS:

## 2024-10-21 NOTE — OP NOTE
[facility]    Aubree Alegria  [id number]  [id number]  10/21/2024      Hysterectomy Procedure Note      Pre-operative Diagnosis: Menorrhagia and Fibroids    Post-operative Diagnosis: Menorrhagia and Fibroids    Operation: Total Laparoscopic Hysterectomy with bilateral salpingoophorectomy, da Stevan assisted    Surgeon: Kathy Marie MD, FACOG    Assistants: OR Staff    Anesthesia: General endotracheal anesthesia, General    Findings: Fibroid uterus in otherwise normal pelvis.  Unremarkable cystoscopy    Estimated Blood Loss: 100 ml  IVF: 800 ml  UOP: 300 ml      Specimens: Uterus, Cervix, Bilateral fallopian tubes, and Bilateral ovaries    Complications:  None    Disposition: PACU - hemodynamically stable.      Procedure Details    The patient was seen in the Holding Room. The risks, benefits, complications, treatment options, and expected outcomes were discussed with the patient. The patient concurred with the proposed plan, giving informed consent. The patient was identified as Aubree Alegria and the procedure verified as Robotic hysterectomy with bilateral salpingooophorectomy. A Time Out was held and the above information confirmed.    After these above consents were obtained the patient was taken to the  operating room, where general anesthesia was administered. She was placed in  the dorsal lithotomy position with care taken in placement of her legs to avoid  nerve injury. She was prepped and draped in the usual sterile fashion. Valencia  catheter was inserted. A complete procedural timeout was completed to ensure  proper patient and proper procedure.    A supraumbilical skin incision  was made with a knife and the Veress needle was inserted carefully and without  difficulty. The abdomen insufflated to 15 mmHg. An 8 mm DaVinci trocar was inserted without difficulty and proper placement was confirmed with the DaVinci scope.  Areas that were immediately  adjacent to entry were free of injury. Two  additional 8 mm da Stevan trocars  were placed under direct visualization and one 8 mm AirSeal trocar was placed under direct visualization without difficulty or complication or injury.      The patient was then placed in Trendelenburg position and a  Homejoyare uterine manipulator, colpotomizer and vaginal occluder were inserted  under direct visualization. The robot was docked using a side docking  technique. Monopolar scissors were placed in arm #1 and bipolar PK instrument into  arm #2.  Examination of the pelvis showed the above findings. The ureters were  identified bilaterally.  Attention was then turned to the adnexa.  The fallopian tubes and ovaries were  from the uterus and cervix by cauterizing and then transecting the round ligament, fallopian tube, and utero-ovarian ligament on both the R and L sides..  This procedure was performed bilaterally, and then dissection proceeded in a stepwise fashion down both sides of the uterine body- beginning by transecting the round ligament, and taking care to stay close to the side of the uterus.  The uterine arteries were skeletonized bilaterally, sealed and transected.  Anteriorly, a bladder flap was created. The bladder was minimally adherent to the lower uterine segment and care was taken to properly gain access to the plane to adequately dissect the bladder off of the lower uterine segment and cervix. Colpotomy was begun and continued circumferentially around the cervix until complete. The specimen was removed vaginally. All pedicles were noted to be hemostatic.  The uterus was fairly enlarged, due to several fibroids; because of this a small (approximately 1.5 cm) abrasion was created on the right sidewall of the vagina.  This was noted at the time it occurred, with intention to repair it later in the case.    Attention was then turned to removal of the adnexa.  Each fallopian tube was grasped with a Kendell [employer] Antonieta grasper by the patient-side assistant and  placed under gentle traction while the tube and ovary were dissected away from the pelvic sidewall with the PK grasper and the monopolar scissors.  The adnexa were each passed out through the open vaginal cuff.    Surgical pedicles were inspected for hemostasis.  The vaginal cuff was closed with Stratafix double-armed, barbed suture beginning in the middle of the cuff and working toward each vaginal cuff angle.  Residual suture on each side was then used to reapproximate the peritoneum back toward the midline, effectively achieving a two layer closure, and incorporating the uterosacral ligaments when appropriate.  The pelvis was copiously irrigated and suctioned and again hemostasis was noted even after desufflation.  Jonathan was placed over all pedicles for added postoperative hemostatic benefits.  The vaginal cuff was then inspected vaginally using a bivalve speculum. Good approximation of the tissue was noted and no bleeding was encountered.  The small abrasion on the patient's right vaginal sidewall was repaired with a single figure-of-eight suture using 3-0 Vicryl.    The 70 degree cystoscope was used to inspect the bladder and all walls. All walls were normal without abnormality or evidence of injury. Bilateral ureteral patency was noted. At this point, the instruments were removed. The robot was undocked, insufflation was released and the trocars were removed.  The patient tolerated the procedure well. All instrument counts were correct. She was taken to the recovery room after extubation in stable condition.     Kathy Marie MD  10/21/2024  10:03 CDT

## 2024-10-22 LAB
CYTO UR: NORMAL
LAB AP CASE REPORT: NORMAL
Lab: NORMAL
PATH REPORT.FINAL DX SPEC: NORMAL
PATH REPORT.GROSS SPEC: NORMAL

## 2024-10-24 ENCOUNTER — HOSPITAL ENCOUNTER (INPATIENT)
Facility: HOSPITAL | Age: 44
LOS: 3 days | Discharge: HOME | DRG: 862 | End: 2024-10-27
Attending: OBSTETRICS & GYNECOLOGY | Admitting: OBSTETRICS & GYNECOLOGY
Payer: COMMERCIAL

## 2024-10-24 ENCOUNTER — APPOINTMENT (OUTPATIENT)
Dept: OTHER | Facility: HOSPITAL | Age: 44
DRG: 862 | End: 2024-10-24
Payer: COMMERCIAL

## 2024-10-24 ENCOUNTER — ANESTHESIA EVENT (OUTPATIENT)
Dept: PERIOP | Facility: HOSPITAL | Age: 44
End: 2024-10-24
Payer: COMMERCIAL

## 2024-10-24 ENCOUNTER — PREP FOR SURGERY (OUTPATIENT)
Dept: OTHER | Facility: HOSPITAL | Age: 44
End: 2024-10-24
Payer: COMMERCIAL

## 2024-10-24 ENCOUNTER — ANESTHESIA (OUTPATIENT)
Dept: PERIOP | Facility: HOSPITAL | Age: 44
End: 2024-10-24
Payer: COMMERCIAL

## 2024-10-24 ENCOUNTER — TELEPHONE (OUTPATIENT)
Dept: OBSTETRICS AND GYNECOLOGY | Age: 44
End: 2024-10-24
Payer: COMMERCIAL

## 2024-10-24 DIAGNOSIS — N73.9 PELVIS, FEMALE ABSCESS: ICD-10-CM

## 2024-10-24 DIAGNOSIS — K65.1 POSTOPERATIVE INTRA-ABDOMINAL ABSCESS: ICD-10-CM

## 2024-10-24 DIAGNOSIS — T81.43XA POSTOPERATIVE INTRA-ABDOMINAL ABSCESS: ICD-10-CM

## 2024-10-24 DIAGNOSIS — Z98.890 S/P LAPAROSCOPY WITH LYSIS OF ADHESIONS: Primary | ICD-10-CM

## 2024-10-24 LAB
ABO GROUP BLD: NORMAL
ALBUMIN SERPL-MCNC: 3.3 G/DL (ref 3.5–5.2)
ALBUMIN/GLOB SERPL: 1.1 G/DL
ALP SERPL-CCNC: 49 U/L (ref 39–117)
ALT SERPL W P-5'-P-CCNC: 7 U/L (ref 1–33)
ANION GAP SERPL CALCULATED.3IONS-SCNC: 12 MMOL/L (ref 5–15)
AST SERPL-CCNC: 9 U/L (ref 1–32)
BASOPHILS # BLD AUTO: 0.04 10*3/MM3 (ref 0–0.2)
BASOPHILS NFR BLD AUTO: 0.2 % (ref 0–1.5)
BILIRUB SERPL-MCNC: 0.7 MG/DL (ref 0–1.2)
BLD GP AB SCN SERPL QL: NEGATIVE
BUN SERPL-MCNC: 6 MG/DL (ref 6–20)
BUN/CREAT SERPL: 9.4 (ref 7–25)
CALCIUM SPEC-SCNC: 8 MG/DL (ref 8.6–10.5)
CHLORIDE SERPL-SCNC: 104 MMOL/L (ref 98–107)
CO2 SERPL-SCNC: 21 MMOL/L (ref 22–29)
CREAT SERPL-MCNC: 0.64 MG/DL (ref 0.57–1)
D-LACTATE SERPL-SCNC: 0.7 MMOL/L (ref 0.5–2)
DEPRECATED RDW RBC AUTO: 47.7 FL (ref 37–54)
EGFRCR SERPLBLD CKD-EPI 2021: 110.5 ML/MIN/1.73
EOSINOPHIL # BLD AUTO: 0.03 10*3/MM3 (ref 0–0.4)
EOSINOPHIL NFR BLD AUTO: 0.2 % (ref 0.3–6.2)
ERYTHROCYTE [DISTWIDTH] IN BLOOD BY AUTOMATED COUNT: 17.6 % (ref 12.3–15.4)
GLOBULIN UR ELPH-MCNC: 3.1 GM/DL
GLUCOSE SERPL-MCNC: 114 MG/DL (ref 65–99)
HCT VFR BLD AUTO: 30.6 % (ref 34–46.6)
HGB BLD-MCNC: 9.5 G/DL (ref 12–15.9)
IMM GRANULOCYTES # BLD AUTO: 0.19 10*3/MM3 (ref 0–0.05)
IMM GRANULOCYTES NFR BLD AUTO: 1 % (ref 0–0.5)
LYMPHOCYTES # BLD AUTO: 1.62 10*3/MM3 (ref 0.7–3.1)
LYMPHOCYTES NFR BLD AUTO: 8.7 % (ref 19.6–45.3)
MAGNESIUM SERPL-MCNC: 1.8 MG/DL (ref 1.6–2.6)
MCH RBC QN AUTO: 23.2 PG (ref 26.6–33)
MCHC RBC AUTO-ENTMCNC: 31 G/DL (ref 31.5–35.7)
MCV RBC AUTO: 74.6 FL (ref 79–97)
MONOCYTES # BLD AUTO: 1.3 10*3/MM3 (ref 0.1–0.9)
MONOCYTES NFR BLD AUTO: 7 % (ref 5–12)
NEUTROPHILS NFR BLD AUTO: 15.48 10*3/MM3 (ref 1.7–7)
NEUTROPHILS NFR BLD AUTO: 82.9 % (ref 42.7–76)
NRBC BLD AUTO-RTO: 0 /100 WBC (ref 0–0.2)
PHOSPHATE SERPL-MCNC: 3.2 MG/DL (ref 2.5–4.5)
PLATELET # BLD AUTO: 362 10*3/MM3 (ref 140–450)
PMV BLD AUTO: 11.4 FL (ref 6–12)
POTASSIUM SERPL-SCNC: 3.4 MMOL/L (ref 3.5–5.2)
PROCALCITONIN SERPL-MCNC: 0.17 NG/ML (ref 0–0.25)
PROT SERPL-MCNC: 6.4 G/DL (ref 6–8.5)
RBC # BLD AUTO: 4.1 10*6/MM3 (ref 3.77–5.28)
RH BLD: POSITIVE
SODIUM SERPL-SCNC: 137 MMOL/L (ref 136–145)
T&S EXPIRATION DATE: NORMAL
WBC NRBC COR # BLD AUTO: 18.66 10*3/MM3 (ref 3.4–10.8)

## 2024-10-24 PROCEDURE — 87186 SC STD MICRODIL/AGAR DIL: CPT | Performed by: OBSTETRICS & GYNECOLOGY

## 2024-10-24 PROCEDURE — 25010000002 CEFAZOLIN PER 500 MG: Performed by: OBSTETRICS & GYNECOLOGY

## 2024-10-24 PROCEDURE — 85025 COMPLETE CBC W/AUTO DIFF WBC: CPT | Performed by: OBSTETRICS & GYNECOLOGY

## 2024-10-24 PROCEDURE — 25010000002 LIDOCAINE PF 2% 2 % SOLUTION: Performed by: NURSE ANESTHETIST, CERTIFIED REGISTERED

## 2024-10-24 PROCEDURE — 86900 BLOOD TYPING SEROLOGIC ABO: CPT | Performed by: OBSTETRICS & GYNECOLOGY

## 2024-10-24 PROCEDURE — 84100 ASSAY OF PHOSPHORUS: CPT | Performed by: OBSTETRICS & GYNECOLOGY

## 2024-10-24 PROCEDURE — 83735 ASSAY OF MAGNESIUM: CPT | Performed by: OBSTETRICS & GYNECOLOGY

## 2024-10-24 PROCEDURE — 87070 CULTURE OTHR SPECIMN AEROBIC: CPT | Performed by: OBSTETRICS & GYNECOLOGY

## 2024-10-24 PROCEDURE — 87077 CULTURE AEROBIC IDENTIFY: CPT | Performed by: OBSTETRICS & GYNECOLOGY

## 2024-10-24 PROCEDURE — 87040 BLOOD CULTURE FOR BACTERIA: CPT | Performed by: OBSTETRICS & GYNECOLOGY

## 2024-10-24 PROCEDURE — 0DJD8ZZ INSPECTION OF LOWER INTESTINAL TRACT, VIA NATURAL OR ARTIFICIAL OPENING ENDOSCOPIC: ICD-10-PCS | Performed by: SURGERY

## 2024-10-24 PROCEDURE — 84145 PROCALCITONIN (PCT): CPT | Performed by: OBSTETRICS & GYNECOLOGY

## 2024-10-24 PROCEDURE — 87075 CULTR BACTERIA EXCEPT BLOOD: CPT | Performed by: OBSTETRICS & GYNECOLOGY

## 2024-10-24 PROCEDURE — 25810000003 LACTATED RINGERS PER 1000 ML: Performed by: OBSTETRICS & GYNECOLOGY

## 2024-10-24 PROCEDURE — 83605 ASSAY OF LACTIC ACID: CPT | Performed by: OBSTETRICS & GYNECOLOGY

## 2024-10-24 PROCEDURE — 25010000002 PROPOFOL 10 MG/ML EMULSION: Performed by: NURSE ANESTHETIST, CERTIFIED REGISTERED

## 2024-10-24 PROCEDURE — 86850 RBC ANTIBODY SCREEN: CPT | Performed by: OBSTETRICS & GYNECOLOGY

## 2024-10-24 PROCEDURE — 99223 1ST HOSP IP/OBS HIGH 75: CPT | Performed by: OBSTETRICS & GYNECOLOGY

## 2024-10-24 PROCEDURE — 80053 COMPREHEN METABOLIC PANEL: CPT | Performed by: OBSTETRICS & GYNECOLOGY

## 2024-10-24 PROCEDURE — 25010000002 LIDOCAINE PF 1% 1 % SOLUTION: Performed by: OBSTETRICS & GYNECOLOGY

## 2024-10-24 PROCEDURE — 87205 SMEAR GRAM STAIN: CPT | Performed by: OBSTETRICS & GYNECOLOGY

## 2024-10-24 PROCEDURE — 25010000002 MIDAZOLAM PER 1MG: Performed by: NURSE ANESTHETIST, CERTIFIED REGISTERED

## 2024-10-24 PROCEDURE — 3E1M48Z IRRIGATION OF PERITONEAL CAVITY USING IRRIGATING SUBSTANCE, PERCUTANEOUS ENDOSCOPIC APPROACH: ICD-10-PCS | Performed by: SURGERY

## 2024-10-24 PROCEDURE — 86901 BLOOD TYPING SEROLOGIC RH(D): CPT | Performed by: OBSTETRICS & GYNECOLOGY

## 2024-10-24 RX ORDER — CALCIUM CARBONATE 500 MG/1
2 TABLET, CHEWABLE ORAL 2 TIMES DAILY PRN
Status: CANCELLED | OUTPATIENT
Start: 2024-10-24

## 2024-10-24 RX ORDER — NALOXONE HCL 0.4 MG/ML
0.4 VIAL (ML) INJECTION
Status: DISCONTINUED | OUTPATIENT
Start: 2024-10-24 | End: 2024-10-25

## 2024-10-24 RX ORDER — ACETAMINOPHEN 650 MG/1
650 SUPPOSITORY RECTAL EVERY 4 HOURS PRN
Status: DISCONTINUED | OUTPATIENT
Start: 2024-10-24 | End: 2024-10-25

## 2024-10-24 RX ORDER — ROCURONIUM BROMIDE 10 MG/ML
INJECTION, SOLUTION INTRAVENOUS AS NEEDED
Status: DISCONTINUED | OUTPATIENT
Start: 2024-10-24 | End: 2024-10-25 | Stop reason: SURG

## 2024-10-24 RX ORDER — AMOXICILLIN 250 MG
2 CAPSULE ORAL 2 TIMES DAILY
Status: DISCONTINUED | OUTPATIENT
Start: 2024-10-24 | End: 2024-10-25

## 2024-10-24 RX ORDER — ACETAMINOPHEN 160 MG/5ML
650 SOLUTION ORAL EVERY 4 HOURS PRN
Status: DISCONTINUED | OUTPATIENT
Start: 2024-10-24 | End: 2024-10-25

## 2024-10-24 RX ORDER — MIDAZOLAM HYDROCHLORIDE 2 MG/2ML
INJECTION, SOLUTION INTRAMUSCULAR; INTRAVENOUS AS NEEDED
Status: DISCONTINUED | OUTPATIENT
Start: 2024-10-24 | End: 2024-10-25 | Stop reason: SURG

## 2024-10-24 RX ORDER — ONDANSETRON 4 MG/1
4 TABLET, ORALLY DISINTEGRATING ORAL EVERY 6 HOURS PRN
Status: DISCONTINUED | OUTPATIENT
Start: 2024-10-24 | End: 2024-10-25

## 2024-10-24 RX ORDER — ONDANSETRON 2 MG/ML
4 INJECTION INTRAMUSCULAR; INTRAVENOUS EVERY 6 HOURS PRN
Status: DISCONTINUED | OUTPATIENT
Start: 2024-10-24 | End: 2024-10-25

## 2024-10-24 RX ORDER — LIDOCAINE HYDROCHLORIDE 10 MG/ML
INJECTION, SOLUTION EPIDURAL; INFILTRATION; INTRACAUDAL; PERINEURAL AS NEEDED
Status: DISCONTINUED | OUTPATIENT
Start: 2024-10-24 | End: 2024-10-25 | Stop reason: HOSPADM

## 2024-10-24 RX ORDER — LIDOCAINE HYDROCHLORIDE 20 MG/ML
INJECTION, SOLUTION EPIDURAL; INFILTRATION; INTRACAUDAL; PERINEURAL AS NEEDED
Status: DISCONTINUED | OUTPATIENT
Start: 2024-10-24 | End: 2024-10-25 | Stop reason: SURG

## 2024-10-24 RX ORDER — MAGNESIUM HYDROXIDE 1200 MG/15ML
LIQUID ORAL AS NEEDED
Status: DISCONTINUED | OUTPATIENT
Start: 2024-10-24 | End: 2024-10-25 | Stop reason: HOSPADM

## 2024-10-24 RX ORDER — HYDROMORPHONE HYDROCHLORIDE 1 MG/ML
0.5 INJECTION, SOLUTION INTRAMUSCULAR; INTRAVENOUS; SUBCUTANEOUS
Status: CANCELLED | OUTPATIENT
Start: 2024-10-24 | End: 2024-10-29

## 2024-10-24 RX ORDER — OXYCODONE HYDROCHLORIDE 5 MG/1
5 TABLET ORAL EVERY 6 HOURS PRN
Status: DISCONTINUED | OUTPATIENT
Start: 2024-10-24 | End: 2024-10-25

## 2024-10-24 RX ORDER — SUCCINYLCHOLINE/SOD CL,ISO/PF 200MG/10ML
SYRINGE (ML) INTRAVENOUS AS NEEDED
Status: DISCONTINUED | OUTPATIENT
Start: 2024-10-24 | End: 2024-10-25 | Stop reason: SURG

## 2024-10-24 RX ORDER — SODIUM CHLORIDE 9 MG/ML
100 INJECTION, SOLUTION INTRAVENOUS CONTINUOUS
Status: CANCELLED | OUTPATIENT
Start: 2024-10-24 | End: 2024-10-25

## 2024-10-24 RX ORDER — SODIUM CHLORIDE 0.9 % (FLUSH) 0.9 %
10 SYRINGE (ML) INJECTION EVERY 12 HOURS SCHEDULED
Status: DISCONTINUED | OUTPATIENT
Start: 2024-10-24 | End: 2024-10-25

## 2024-10-24 RX ORDER — ACETAMINOPHEN 325 MG/1
650 TABLET ORAL EVERY 4 HOURS PRN
Status: DISCONTINUED | OUTPATIENT
Start: 2024-10-24 | End: 2024-10-25

## 2024-10-24 RX ORDER — ACETAMINOPHEN 160 MG/5ML
650 SOLUTION ORAL EVERY 4 HOURS PRN
Status: CANCELLED | OUTPATIENT
Start: 2024-10-24

## 2024-10-24 RX ORDER — POLYETHYLENE GLYCOL 3350 17 G/17G
17 POWDER, FOR SOLUTION ORAL DAILY PRN
Status: DISCONTINUED | OUTPATIENT
Start: 2024-10-24 | End: 2024-10-25

## 2024-10-24 RX ORDER — SODIUM CHLORIDE, SODIUM LACTATE, POTASSIUM CHLORIDE, CALCIUM CHLORIDE 600; 310; 30; 20 MG/100ML; MG/100ML; MG/100ML; MG/100ML
125 INJECTION, SOLUTION INTRAVENOUS CONTINUOUS
Status: DISCONTINUED | OUTPATIENT
Start: 2024-10-24 | End: 2024-10-25

## 2024-10-24 RX ORDER — NALOXONE HCL 0.4 MG/ML
0.4 VIAL (ML) INJECTION
Status: CANCELLED | OUTPATIENT
Start: 2024-10-24

## 2024-10-24 RX ORDER — BISACODYL 5 MG/1
5 TABLET, DELAYED RELEASE ORAL DAILY PRN
Status: DISCONTINUED | OUTPATIENT
Start: 2024-10-24 | End: 2024-10-25

## 2024-10-24 RX ORDER — SODIUM CHLORIDE 0.9 % (FLUSH) 0.9 %
10 SYRINGE (ML) INJECTION AS NEEDED
Status: DISCONTINUED | OUTPATIENT
Start: 2024-10-24 | End: 2024-10-25

## 2024-10-24 RX ORDER — SODIUM CHLORIDE 9 MG/ML
40 INJECTION, SOLUTION INTRAVENOUS AS NEEDED
Status: DISCONTINUED | OUTPATIENT
Start: 2024-10-24 | End: 2024-10-25

## 2024-10-24 RX ORDER — SODIUM CHLORIDE 9 MG/ML
INJECTION, SOLUTION INTRAVENOUS AS NEEDED
Status: DISCONTINUED | OUTPATIENT
Start: 2024-10-24 | End: 2024-10-25 | Stop reason: HOSPADM

## 2024-10-24 RX ORDER — SODIUM CHLORIDE 0.9 % (FLUSH) 0.9 %
10 SYRINGE (ML) INJECTION AS NEEDED
Status: CANCELLED | OUTPATIENT
Start: 2024-10-24

## 2024-10-24 RX ORDER — BISACODYL 10 MG
10 SUPPOSITORY, RECTAL RECTAL DAILY PRN
Status: CANCELLED | OUTPATIENT
Start: 2024-10-24

## 2024-10-24 RX ORDER — FENTANYL CITRATE 50 UG/ML
INJECTION, SOLUTION INTRAMUSCULAR; INTRAVENOUS AS NEEDED
Status: DISCONTINUED | OUTPATIENT
Start: 2024-10-24 | End: 2024-10-25 | Stop reason: SURG

## 2024-10-24 RX ORDER — OXYCODONE HYDROCHLORIDE 5 MG/1
5 TABLET ORAL EVERY 6 HOURS PRN
Status: CANCELLED | OUTPATIENT
Start: 2024-10-24 | End: 2024-10-29

## 2024-10-24 RX ORDER — SODIUM CHLORIDE, SODIUM LACTATE, POTASSIUM CHLORIDE, CALCIUM CHLORIDE 600; 310; 30; 20 MG/100ML; MG/100ML; MG/100ML; MG/100ML
125 INJECTION, SOLUTION INTRAVENOUS CONTINUOUS
Status: CANCELLED | OUTPATIENT
Start: 2024-10-24 | End: 2024-10-27

## 2024-10-24 RX ORDER — AMOXICILLIN 250 MG
2 CAPSULE ORAL 2 TIMES DAILY
Status: CANCELLED | OUTPATIENT
Start: 2024-10-24

## 2024-10-24 RX ORDER — SODIUM CHLORIDE 0.9 % (FLUSH) 0.9 %
10 SYRINGE (ML) INJECTION EVERY 12 HOURS SCHEDULED
OUTPATIENT
Start: 2024-10-24

## 2024-10-24 RX ORDER — ACETAMINOPHEN 325 MG/1
650 TABLET ORAL EVERY 4 HOURS PRN
Status: CANCELLED | OUTPATIENT
Start: 2024-10-24

## 2024-10-24 RX ORDER — ONDANSETRON 4 MG/1
4 TABLET, ORALLY DISINTEGRATING ORAL EVERY 6 HOURS PRN
Status: CANCELLED | OUTPATIENT
Start: 2024-10-24

## 2024-10-24 RX ORDER — BISACODYL 5 MG/1
5 TABLET, DELAYED RELEASE ORAL DAILY PRN
Status: CANCELLED | OUTPATIENT
Start: 2024-10-24

## 2024-10-24 RX ORDER — SODIUM CHLORIDE 0.9 % (FLUSH) 0.9 %
1-10 SYRINGE (ML) INJECTION AS NEEDED
OUTPATIENT
Start: 2024-10-24

## 2024-10-24 RX ORDER — ACETAMINOPHEN 650 MG/1
650 SUPPOSITORY RECTAL EVERY 4 HOURS PRN
Status: CANCELLED | OUTPATIENT
Start: 2024-10-24

## 2024-10-24 RX ORDER — CALCIUM CARBONATE 500 MG/1
2 TABLET, CHEWABLE ORAL 2 TIMES DAILY PRN
Status: DISCONTINUED | OUTPATIENT
Start: 2024-10-24 | End: 2024-10-25

## 2024-10-24 RX ORDER — BISACODYL 10 MG
10 SUPPOSITORY, RECTAL RECTAL DAILY PRN
Status: DISCONTINUED | OUTPATIENT
Start: 2024-10-24 | End: 2024-10-25

## 2024-10-24 RX ORDER — PROPOFOL 10 MG/ML
VIAL (ML) INTRAVENOUS AS NEEDED
Status: DISCONTINUED | OUTPATIENT
Start: 2024-10-24 | End: 2024-10-25 | Stop reason: SURG

## 2024-10-24 RX ORDER — POLYETHYLENE GLYCOL 3350 17 G/17G
17 POWDER, FOR SOLUTION ORAL DAILY PRN
Status: CANCELLED | OUTPATIENT
Start: 2024-10-24

## 2024-10-24 RX ORDER — HYDROMORPHONE HYDROCHLORIDE 1 MG/ML
0.5 INJECTION, SOLUTION INTRAMUSCULAR; INTRAVENOUS; SUBCUTANEOUS
Status: DISCONTINUED | OUTPATIENT
Start: 2024-10-24 | End: 2024-10-25

## 2024-10-24 RX ORDER — ONDANSETRON 2 MG/ML
4 INJECTION INTRAMUSCULAR; INTRAVENOUS EVERY 6 HOURS PRN
Status: CANCELLED | OUTPATIENT
Start: 2024-10-24

## 2024-10-24 RX ORDER — SODIUM CHLORIDE 9 MG/ML
40 INJECTION, SOLUTION INTRAVENOUS AS NEEDED
Status: CANCELLED | OUTPATIENT
Start: 2024-10-24 | End: 2024-10-29

## 2024-10-24 RX ORDER — SODIUM CHLORIDE 0.9 % (FLUSH) 0.9 %
10 SYRINGE (ML) INJECTION EVERY 12 HOURS SCHEDULED
Status: CANCELLED | OUTPATIENT
Start: 2024-10-24

## 2024-10-24 RX ADMIN — FENTANYL CITRATE 100 MCG: 50 INJECTION, SOLUTION INTRAMUSCULAR; INTRAVENOUS at 23:30

## 2024-10-24 RX ADMIN — FENTANYL CITRATE 100 MCG: 50 INJECTION, SOLUTION INTRAMUSCULAR; INTRAVENOUS at 22:57

## 2024-10-24 RX ADMIN — ROCURONIUM BROMIDE 30 MG: 50 INJECTION INTRAVENOUS at 23:04

## 2024-10-24 RX ADMIN — PROPOFOL 150 MG: 10 INJECTION, EMULSION INTRAVENOUS at 22:57

## 2024-10-24 RX ADMIN — ROCURONIUM BROMIDE 10 MG: 50 INJECTION INTRAVENOUS at 22:57

## 2024-10-24 RX ADMIN — MIDAZOLAM HYDROCHLORIDE 2 MG: 1 INJECTION, SOLUTION INTRAMUSCULAR; INTRAVENOUS at 22:49

## 2024-10-24 RX ADMIN — Medication 10 ML: at 21:22

## 2024-10-24 RX ADMIN — SODIUM CHLORIDE, POTASSIUM CHLORIDE, SODIUM LACTATE AND CALCIUM CHLORIDE 125 ML/HR: 600; 310; 30; 20 INJECTION, SOLUTION INTRAVENOUS at 21:18

## 2024-10-24 RX ADMIN — Medication 120 MG: at 22:57

## 2024-10-24 RX ADMIN — LIDOCAINE HYDROCHLORIDE 100 MG: 20 INJECTION, SOLUTION EPIDURAL; INFILTRATION; INTRACAUDAL; PERINEURAL at 22:57

## 2024-10-24 NOTE — TELEPHONE ENCOUNTER
Post op patient.  Fever and cough, temp 101.8 pulse ox 93% room air, taking deep breaths and ambulate as tolerated.  Pt c/o chills and just not feeling well.  Pt advised to come to office for an appt this morning.  Pt added to Dr Marie.

## 2024-10-25 PROBLEM — Z98.890 S/P LAPAROSCOPY WITH LYSIS OF ADHESIONS: Status: ACTIVE | Noted: 2024-10-25

## 2024-10-25 LAB
ALBUMIN SERPL-MCNC: 2.8 G/DL (ref 3.5–5.2)
ALBUMIN/GLOB SERPL: 1 G/DL
ALP SERPL-CCNC: 46 U/L (ref 39–117)
ALT SERPL W P-5'-P-CCNC: 5 U/L (ref 1–33)
ANION GAP SERPL CALCULATED.3IONS-SCNC: 13 MMOL/L (ref 5–15)
ANISOCYTOSIS BLD QL: ABNORMAL
AST SERPL-CCNC: 7 U/L (ref 1–32)
BASOPHILS # BLD MANUAL: 0 10*3/MM3 (ref 0–0.2)
BASOPHILS NFR BLD MANUAL: 0 % (ref 0–1.5)
BILIRUB SERPL-MCNC: 0.7 MG/DL (ref 0–1.2)
BUN SERPL-MCNC: 8 MG/DL (ref 6–20)
BUN/CREAT SERPL: 12.9 (ref 7–25)
BURR CELLS BLD QL SMEAR: ABNORMAL
CALCIUM SPEC-SCNC: 8.2 MG/DL (ref 8.6–10.5)
CHLORIDE SERPL-SCNC: 105 MMOL/L (ref 98–107)
CLUMPED PLATELETS: PRESENT
CO2 SERPL-SCNC: 20 MMOL/L (ref 22–29)
CREAT SERPL-MCNC: 0.62 MG/DL (ref 0.57–1)
DACRYOCYTES BLD QL SMEAR: ABNORMAL
DEPRECATED RDW RBC AUTO: 47.8 FL (ref 37–54)
EGFRCR SERPLBLD CKD-EPI 2021: 111.4 ML/MIN/1.73
EOSINOPHIL # BLD MANUAL: 0 10*3/MM3 (ref 0–0.4)
EOSINOPHIL NFR BLD MANUAL: 0 % (ref 0.3–6.2)
ERYTHROCYTE [DISTWIDTH] IN BLOOD BY AUTOMATED COUNT: 17.8 % (ref 12.3–15.4)
GLOBULIN UR ELPH-MCNC: 2.9 GM/DL
GLUCOSE SERPL-MCNC: 184 MG/DL (ref 65–99)
HCT VFR BLD AUTO: 30.9 % (ref 34–46.6)
HGB BLD-MCNC: 9.5 G/DL (ref 12–15.9)
LYMPHOCYTES # BLD MANUAL: 0.26 10*3/MM3 (ref 0.7–3.1)
LYMPHOCYTES NFR BLD MANUAL: 2 % (ref 5–12)
MCH RBC QN AUTO: 22.9 PG (ref 26.6–33)
MCHC RBC AUTO-ENTMCNC: 30.7 G/DL (ref 31.5–35.7)
MCV RBC AUTO: 74.6 FL (ref 79–97)
METAMYELOCYTES NFR BLD MANUAL: 2 % (ref 0–0)
MICROCYTES BLD QL: ABNORMAL
MONOCYTES # BLD: 0.26 10*3/MM3 (ref 0.1–0.9)
NEUTROPHILS # BLD AUTO: 12.42 10*3/MM3 (ref 1.7–7)
NEUTROPHILS NFR BLD MANUAL: 71.3 % (ref 42.7–76)
NEUTS BAND NFR BLD MANUAL: 22.8 % (ref 0–5)
NEUTS VAC BLD QL SMEAR: ABNORMAL
OVALOCYTES BLD QL SMEAR: ABNORMAL
PLATELET # BLD AUTO: 355 10*3/MM3 (ref 140–450)
PMV BLD AUTO: 11.9 FL (ref 6–12)
POIKILOCYTOSIS BLD QL SMEAR: ABNORMAL
POLYCHROMASIA BLD QL SMEAR: ABNORMAL
POTASSIUM SERPL-SCNC: 3.6 MMOL/L (ref 3.5–5.2)
PROT SERPL-MCNC: 5.7 G/DL (ref 6–8.5)
RBC # BLD AUTO: 4.14 10*6/MM3 (ref 3.77–5.28)
SODIUM SERPL-SCNC: 138 MMOL/L (ref 136–145)
TOXIC GRANULATION: ABNORMAL
VARIANT LYMPHS NFR BLD MANUAL: 2 % (ref 19.6–45.3)
WBC NRBC COR # BLD AUTO: 13.2 10*3/MM3 (ref 3.4–10.8)

## 2024-10-25 PROCEDURE — 25010000002 FENTANYL CITRATE (PF) 100 MCG/2ML SOLUTION: Performed by: NURSE ANESTHETIST, CERTIFIED REGISTERED

## 2024-10-25 PROCEDURE — 25010000002 ONDANSETRON PER 1 MG: Performed by: SURGERY

## 2024-10-25 PROCEDURE — 25010000002 DROPERIDOL PER 5 MG: Performed by: NURSE ANESTHETIST, CERTIFIED REGISTERED

## 2024-10-25 PROCEDURE — 25810000003 LACTATED RINGERS PER 1000 ML: Performed by: SURGERY

## 2024-10-25 PROCEDURE — 25010000002 FENTANYL CITRATE (PF) 50 MCG/ML SOLUTION: Performed by: NURSE ANESTHETIST, CERTIFIED REGISTERED

## 2024-10-25 PROCEDURE — 25010000002 DEXAMETHASONE PER 1 MG: Performed by: NURSE ANESTHETIST, CERTIFIED REGISTERED

## 2024-10-25 PROCEDURE — 85025 COMPLETE CBC W/AUTO DIFF WBC: CPT | Performed by: OBSTETRICS & GYNECOLOGY

## 2024-10-25 PROCEDURE — 25010000002 HYDROMORPHONE PER 4 MG: Performed by: NURSE ANESTHETIST, CERTIFIED REGISTERED

## 2024-10-25 PROCEDURE — 80053 COMPREHEN METABOLIC PANEL: CPT | Performed by: SURGERY

## 2024-10-25 PROCEDURE — 85007 BL SMEAR W/DIFF WBC COUNT: CPT | Performed by: OBSTETRICS & GYNECOLOGY

## 2024-10-25 PROCEDURE — 25010000002 ONDANSETRON PER 1 MG: Performed by: NURSE ANESTHETIST, CERTIFIED REGISTERED

## 2024-10-25 PROCEDURE — 25010000002 SUGAMMADEX 200 MG/2ML SOLUTION: Performed by: NURSE ANESTHETIST, CERTIFIED REGISTERED

## 2024-10-25 PROCEDURE — 25010000002 PIPERACILLIN SOD-TAZOBACTAM PER 1 G: Performed by: SURGERY

## 2024-10-25 RX ORDER — ONDANSETRON 2 MG/ML
4 INJECTION INTRAMUSCULAR; INTRAVENOUS
Status: DISCONTINUED | OUTPATIENT
Start: 2024-10-25 | End: 2024-10-25

## 2024-10-25 RX ORDER — LABETALOL HYDROCHLORIDE 5 MG/ML
5 INJECTION, SOLUTION INTRAVENOUS
Status: DISCONTINUED | OUTPATIENT
Start: 2024-10-25 | End: 2024-10-25

## 2024-10-25 RX ORDER — ONDANSETRON 2 MG/ML
4 INJECTION INTRAMUSCULAR; INTRAVENOUS EVERY 6 HOURS PRN
Status: DISCONTINUED | OUTPATIENT
Start: 2024-10-25 | End: 2024-10-26

## 2024-10-25 RX ORDER — DIPHENHYDRAMINE HYDROCHLORIDE 50 MG/ML
25 INJECTION INTRAMUSCULAR; INTRAVENOUS EVERY 6 HOURS PRN
Status: DISCONTINUED | OUTPATIENT
Start: 2024-10-25 | End: 2024-10-27 | Stop reason: HOSPADM

## 2024-10-25 RX ORDER — SCOLOPAMINE TRANSDERMAL SYSTEM 1 MG/1
1 PATCH, EXTENDED RELEASE TRANSDERMAL
Status: DISCONTINUED | OUTPATIENT
Start: 2024-10-25 | End: 2024-10-27 | Stop reason: HOSPADM

## 2024-10-25 RX ORDER — NALOXONE HCL 0.4 MG/ML
0.04 VIAL (ML) INJECTION AS NEEDED
Status: DISCONTINUED | OUTPATIENT
Start: 2024-10-25 | End: 2024-10-25

## 2024-10-25 RX ORDER — OXYCODONE AND ACETAMINOPHEN 10; 325 MG/1; MG/1
1 TABLET ORAL EVERY 4 HOURS PRN
Status: DISCONTINUED | OUTPATIENT
Start: 2024-10-25 | End: 2024-10-25

## 2024-10-25 RX ORDER — SODIUM CHLORIDE 0.9 % (FLUSH) 0.9 %
1-10 SYRINGE (ML) INJECTION AS NEEDED
Status: DISCONTINUED | OUTPATIENT
Start: 2024-10-25 | End: 2024-10-27 | Stop reason: HOSPADM

## 2024-10-25 RX ORDER — HYDROMORPHONE HYDROCHLORIDE 1 MG/ML
0.5 INJECTION, SOLUTION INTRAMUSCULAR; INTRAVENOUS; SUBCUTANEOUS
Status: DISCONTINUED | OUTPATIENT
Start: 2024-10-25 | End: 2024-10-25

## 2024-10-25 RX ORDER — DEXAMETHASONE SODIUM PHOSPHATE 4 MG/ML
INJECTION, SOLUTION INTRA-ARTICULAR; INTRALESIONAL; INTRAMUSCULAR; INTRAVENOUS; SOFT TISSUE AS NEEDED
Status: DISCONTINUED | OUTPATIENT
Start: 2024-10-25 | End: 2024-10-25 | Stop reason: SURG

## 2024-10-25 RX ORDER — ONDANSETRON 2 MG/ML
INJECTION INTRAMUSCULAR; INTRAVENOUS AS NEEDED
Status: DISCONTINUED | OUTPATIENT
Start: 2024-10-25 | End: 2024-10-25 | Stop reason: SURG

## 2024-10-25 RX ORDER — FENTANYL CITRATE 50 UG/ML
50 INJECTION, SOLUTION INTRAMUSCULAR; INTRAVENOUS
Status: DISCONTINUED | OUTPATIENT
Start: 2024-10-25 | End: 2024-10-25

## 2024-10-25 RX ORDER — DROPERIDOL 2.5 MG/ML
0.62 INJECTION, SOLUTION INTRAMUSCULAR; INTRAVENOUS ONCE AS NEEDED
Status: COMPLETED | OUTPATIENT
Start: 2024-10-25 | End: 2024-10-25

## 2024-10-25 RX ORDER — ACETAMINOPHEN 325 MG/1
325 TABLET ORAL EVERY 8 HOURS
Status: DISCONTINUED | OUTPATIENT
Start: 2024-10-25 | End: 2024-10-27 | Stop reason: HOSPADM

## 2024-10-25 RX ORDER — LABETALOL HYDROCHLORIDE 5 MG/ML
10 INJECTION, SOLUTION INTRAVENOUS
Status: DISCONTINUED | OUTPATIENT
Start: 2024-10-25 | End: 2024-10-27 | Stop reason: HOSPADM

## 2024-10-25 RX ORDER — SODIUM CHLORIDE 0.9 % (FLUSH) 0.9 %
10 SYRINGE (ML) INJECTION EVERY 12 HOURS SCHEDULED
Status: DISCONTINUED | OUTPATIENT
Start: 2024-10-25 | End: 2024-10-27 | Stop reason: HOSPADM

## 2024-10-25 RX ORDER — DEXAMETHASONE SODIUM PHOSPHATE 4 MG/ML
4 INJECTION, SOLUTION INTRA-ARTICULAR; INTRALESIONAL; INTRAMUSCULAR; INTRAVENOUS; SOFT TISSUE EVERY 8 HOURS PRN
Status: DISCONTINUED | OUTPATIENT
Start: 2024-10-25 | End: 2024-10-27 | Stop reason: HOSPADM

## 2024-10-25 RX ORDER — SODIUM CHLORIDE, SODIUM LACTATE, POTASSIUM CHLORIDE, CALCIUM CHLORIDE 600; 310; 30; 20 MG/100ML; MG/100ML; MG/100ML; MG/100ML
75 INJECTION, SOLUTION INTRAVENOUS CONTINUOUS
Status: DISCONTINUED | OUTPATIENT
Start: 2024-10-25 | End: 2024-10-26

## 2024-10-25 RX ORDER — OXYCODONE HYDROCHLORIDE 5 MG/1
5 TABLET ORAL EVERY 4 HOURS PRN
Status: DISCONTINUED | OUTPATIENT
Start: 2024-10-25 | End: 2024-10-27 | Stop reason: HOSPADM

## 2024-10-25 RX ORDER — FLUMAZENIL 0.1 MG/ML
0.2 INJECTION INTRAVENOUS AS NEEDED
Status: DISCONTINUED | OUTPATIENT
Start: 2024-10-25 | End: 2024-10-25

## 2024-10-25 RX ORDER — SIMETHICONE 80 MG
40 TABLET,CHEWABLE ORAL 4 TIMES DAILY PRN
Status: DISCONTINUED | OUTPATIENT
Start: 2024-10-25 | End: 2024-10-27 | Stop reason: HOSPADM

## 2024-10-25 RX ORDER — NALOXONE HCL 0.4 MG/ML
0.1 VIAL (ML) INJECTION
Status: DISCONTINUED | OUTPATIENT
Start: 2024-10-25 | End: 2024-10-27 | Stop reason: HOSPADM

## 2024-10-25 RX ADMIN — DROPERIDOL 0.62 MG: 2.5 INJECTION, SOLUTION INTRAMUSCULAR; INTRAVENOUS at 01:26

## 2024-10-25 RX ADMIN — SUGAMMADEX 200 MG: 100 INJECTION, SOLUTION INTRAVENOUS at 00:51

## 2024-10-25 RX ADMIN — FENTANYL CITRATE 50 MCG: 50 INJECTION, SOLUTION INTRAMUSCULAR; INTRAVENOUS at 01:29

## 2024-10-25 RX ADMIN — ACETAMINOPHEN 325 MG: 325 TABLET ORAL at 09:21

## 2024-10-25 RX ADMIN — DEXAMETHASONE SODIUM PHOSPHATE 4 MG: 4 INJECTION INTRA-ARTICULAR; INTRALESIONAL; INTRAMUSCULAR; INTRAVENOUS; SOFT TISSUE at 00:46

## 2024-10-25 RX ADMIN — OXYCODONE HYDROCHLORIDE 5 MG: 5 TABLET ORAL at 22:53

## 2024-10-25 RX ADMIN — ACETAMINOPHEN 325 MG: 325 TABLET ORAL at 02:31

## 2024-10-25 RX ADMIN — ACETAMINOPHEN 325 MG: 325 TABLET ORAL at 18:17

## 2024-10-25 RX ADMIN — PIPERACILLIN SODIUM AND TAZOBACTAM SODIUM 3.38 G: 3; .375 INJECTION, POWDER, LYOPHILIZED, FOR SOLUTION INTRAVENOUS at 09:21

## 2024-10-25 RX ADMIN — ONDANSETRON 4 MG: 2 INJECTION INTRAMUSCULAR; INTRAVENOUS at 00:46

## 2024-10-25 RX ADMIN — HYDROMORPHONE HYDROCHLORIDE 0.5 MG: 1 INJECTION, SOLUTION INTRAMUSCULAR; INTRAVENOUS; SUBCUTANEOUS at 01:24

## 2024-10-25 RX ADMIN — PIPERACILLIN SODIUM AND TAZOBACTAM SODIUM 3.38 G: 3; .375 INJECTION, POWDER, LYOPHILIZED, FOR SOLUTION INTRAVENOUS at 02:34

## 2024-10-25 RX ADMIN — HYDROMORPHONE HYDROCHLORIDE 0.5 MG: 1 INJECTION, SOLUTION INTRAMUSCULAR; INTRAVENOUS; SUBCUTANEOUS at 18:27

## 2024-10-25 RX ADMIN — OXYCODONE HYDROCHLORIDE 5 MG: 5 TABLET ORAL at 11:34

## 2024-10-25 RX ADMIN — Medication 10 ML: at 02:44

## 2024-10-25 RX ADMIN — PIPERACILLIN SODIUM AND TAZOBACTAM SODIUM 3.38 G: 3; .375 INJECTION, POWDER, LYOPHILIZED, FOR SOLUTION INTRAVENOUS at 18:17

## 2024-10-25 RX ADMIN — ONDANSETRON 4 MG: 2 INJECTION INTRAMUSCULAR; INTRAVENOUS at 21:24

## 2024-10-25 RX ADMIN — OXYCODONE HYDROCHLORIDE 5 MG: 5 TABLET ORAL at 16:50

## 2024-10-25 RX ADMIN — FENTANYL CITRATE 100 MCG: 50 INJECTION, SOLUTION INTRAMUSCULAR; INTRAVENOUS at 00:36

## 2024-10-25 RX ADMIN — SODIUM CHLORIDE, POTASSIUM CHLORIDE, SODIUM LACTATE AND CALCIUM CHLORIDE 125 ML/HR: 600; 310; 30; 20 INJECTION, SOLUTION INTRAVENOUS at 02:10

## 2024-10-25 NOTE — OP NOTE
Exploratory laparoscopy with intraoperative colonoscopy     Preoperative diagnosis: Intra-abdominal abscess    Postoperative diagnosis: as above      Indications: The patient was admitted to the hospital with presentation of an acute abdominal pain and CT findings concerning of an intra-abdominal abscess with fecal like material, and compressive effects on sigmoid colon.         Surgeon: Goran Rdz MD,   Kathy Marie MD     Assistants: Nabila    Anesthesia: General endotracheal anesthesia    ASA Class: 3, 4          Procedure Details       After the patient was explained the alternatives, benefits, complications, and risks of an exploratory laparoscopy possible open including the risk of intra-abdominal organ injury such as small bowel and colon, postoperative infection as well as postoperative wound issues such as herniations was explained to the patient informed consent was provided and signed.  The patient was brought to the OR suite after receiving preoperative antibiotics, medications and SCDs.  The patient was placed under general anesthesia.  Valencia catheter was placed under a sterile fashion.  The patient was then prepped and draped in standard fashion.  We performed a surgical timeout.  I proceeded with a Veress needle approach in the left upper/subcostal location.  The Veress needle was used to insufflate the abdominal cavity to a pressure of 15 mmHg.  This was followed by placement of a 5 mm incision which was followed by placement of a 5 mm trocar into the abdominal cavity with camera assist in the left upper quadrant incision.      The Veress needle was identified and removed safely.  An 8 mm incision was made in the previous right upper quadrant incision location for placement of an 8 mm trocar under direct visualization.  An 8 mm trocar was placed on the patient's in the previous supraumbilical incision site under direct visualization.   The patient was placed in Trendelenburg position and  rotated to the patient's left side.   There was significant inflammatory changes and adhesions in the pelvis from her previous laparoscopic hysterectomy procedure and bilateral oophorectomy procedure.  The exudative fluid and tissue rind was bluntly removed to expose the sigmoid colon.  The sigmoid colon had small bowel tethered to this due to the inflammatory adhesions.  This was bluntly freed and upon freeing and dissecting the sigmoid colon away from the small bowel I entered the abscess cavity.  This fluid was suctioned and sent for cultures and sensitivity.  Continued my dissection to mobilize the small bowel and run the small bowel to the ileocecal junction.  The patient had a previous appendectomy.    We then irrigated the abdominal cavity with copious amounts of saline infused with antibiotic(Ancef).  Once the bowel was freed away from the sigmoid colon I inspected the sigmoid colon extraluminally and then proceeded with a sigmoidoscopy.  Digital rectal exam was performed and within normal limits.  Stool was noted in the rectal vault which was evacuated manually.  The sigmoidoscopy was challenging due to the stool.  I insufflated the sigmoid colon and rectum and on the laparoscopic side and this bowel was submerged under the saline which showed no evidence of air bubbles or leakage.  Saline fluid was suctioned away from the abdomen.  Through the right lower quadrant 8 mm trocar I placed a 10 flat DANNA drain and positioned it in the pelvis.  The DANNA drain was secured with 2-0 silk to the skin.  Then re-locally anesthetized skin wounds for closure.  Skin wounds were closed with 2-0 nylon.  Prior to closing skin wounds all lap pads and attachments were accounted for at the end of the procedure.  Valencia was discontinued.      Findings: Abdominal abscess and adhesions    Estimated Blood Loss:  minimal           Drains: 10 flat DANNA drain                    Specimens:   Specimens       ID Source Type Tests Collected By  Collected At Frozen?    1 Pelvis Body Fluid ANAEROBIC CULTURE  BODY FLUID CULTURE   Sami Villalobos MD 10/24/24 [id number]     Description: Pelvic abscess fluid culture    This specimen was not marked as sent.                          Complications: None           Disposition: PACU - hemodynamically stable.           Condition: stable

## 2024-10-25 NOTE — NURSING NOTE
Patient was a transfer from [facility]. Upon admission IV was still in place from [facility]. Educated patient on the need of a new IV after 24 hours of use. Patient refused to have IV removed and stuck again. IV was assessed and flushed. IV is patent and currently infusing.

## 2024-10-25 NOTE — PAYOR COMM NOTE
"Una Hinson (44 y.o. Female)   [id number]  Admit 10/24  [facility]   tricia phone  fax          Date of Birth   1980    Social Security Number       [place]   [facility] [address]    Home Phone   [phone]    MRN   [id number]       Oriental orthodox   [facility] [place]    Marital Status                               Admission Date   10/24/24    Admission Type   Urgent    Admitting Provider   Sami Villalobos MD    Attending Provider   Kathy Marie MD    Department, Room/Bed   [facility] MOTHER BABY 2A, M201/1       Discharge Date       Discharge Disposition       Discharge Destination                                 Attending Provider: aKthy Marie MD    Allergies: Ketorolac Tromethamine, Tramadol    Isolation: None   Infection: None   Code Status: CPR    Ht: 157 cm (61.81\")   Wt: 56.6 kg (124 lb 12.5 oz)    Admission Cmt: None   Principal Problem: Postoperative intra-abdominal abscess [T81.43XA,K65.1]                   Active Insurance as of 10/24/2024       Primary Coverage       Payor Plan Insurance Group Employer/Plan Group    [employer]HE[employer] BLUE CROSS [employer] BLUE CROSS BLUE SHIELD PPO [id number]       Payor Plan Address Payor Plan Phone Number Payor Plan Fax Number Effective Dates    [address] [phone]  2023 - None Entered    [place] [address]         Subscriber Name Subscriber Birth Date Member ID       UNA HINSON 1980 [id number]                     Emergency Contacts        (Rel.) Home Phone Work Phone Mobile Phone    BROCK GOMEZ (Father) [phone] -- --                 History & Physical        Sami Villalobos MD at 10/24/24 2115          [facility]  OB/GYN HISTORY AND PHYSICAL    Patient Name:Una Hinson  : 1980  MRN: [id number]  Date of admission: 10/24/2024  [id number]    Subjective  Subjective     Chief Complaint: No chief complaint on file.      History of Present " Illness   Aubree Alegria is a 44 y.o. female  who is admitted as a transfer for post-operative abscess. She is post-operative day 3 from a robot-assisted TLH/BSO on 10/21 for menorrhagia/leiomyomas. Post-operative day 0-1 she reports doing fairly well. Going into POD#2, febrile, nauseated and RLQ pain. Fever as high as 101.6F at home. Called and was told if >102F, report to the ED. She called today for fever, cough, chills, and malaise. Advised to go to PCP/Urgent care. She reports went from PCP to ED at [facility]. While there, evaluation noted leukocytosis, febrile, RLQ pain, pelvic abscess. Transferred subsequently here. At bedside, reports feeling better after dilaudid. Reports RLQ discomfort which has been worsening. Nausea has been worsening. No vomiting. Has decreased appetite secondary to nausea. Voiding without difficulty. Passing flatus. No bowel movement since surgery. Spotting from the vagina only. No abnormal discharge. Febrile, chills, diaphoresis. At [facility], she has received PO potassium, ibuprofen, dilaudid, zofran, and started on zosyn.     Review of Systems   Constitutional:  Positive for chills, diaphoresis and fever.   Gastrointestinal:  Positive for abdominal pain and nausea.   Genitourinary:  Positive for pelvic pain and vaginal pain. Negative for decreased urine volume, difficulty urinating, dyspareunia, dysuria, enuresis, flank pain, frequency, genital sores, hematuria, menstrual problem, urgency, vaginal bleeding and vaginal discharge.   All other systems reviewed and are negative.      Personal History     Past Medical History:   Diagnosis Date    Spinal headache        Past Surgical History:   Procedure Laterality Date    APPENDECTOMY      BREAST BIOPSY Bilateral     benign     SECTION      x2    ECTOPIC PREGNANCY      TOTAL LAPAROSCOPIC HYSTERECTOMY SALPINGO OOPHORECTOMY Bilateral 10/21/2024    Procedure: TOTAL LAPAROSCOPIC HYSTERECTOMY BILATERAL  SALPINGOOPHORECTOMY WITH DAVINCI ROBOT;  Surgeon: Kathy Marie MD;  Location: [facility] OR;  Service: Robotics - DaVinci;  Laterality: Bilateral;    TUBAL ABDOMINAL LIGATION      WISDOM TOOTH EXTRACTION         Family History:  Family History   Problem Relation Age of Onset    Kidney cancer Maternal Aunt     Hypertension Father     No Known Problems Mother     No Known Problems Sister     Leukemia Paternal Grandmother     Breast cancer Paternal Aunt 40    Ovarian cancer Neg Hx     Uterine cancer Neg Hx     Colon cancer Neg Hx     Melanoma Neg Hx     Prostate cancer Neg Hx        Social History:  Social History     Socioeconomic History    Marital status:    Tobacco Use    Smoking status: Some Days     Types: Cigarettes    Smokeless tobacco: Never   Vaping Use    Vaping status: Never Used   Substance and Sexual Activity    Alcohol use: Never    Drug use: Never    Sexual activity: Yes     Partners: Male     Birth control/protection: Surgical       Home Medications:  oxyCODONE-acetaminophen    Allergies:  Allergies   Allergen Reactions    Ketorolac Tromethamine Hives    Tramadol Hives       Objective   Objective     Vitals:    Temp:  [98.3 °F (36.8 °C)] 98.3 °F (36.8 °C)  Heart Rate:  [76] 76  Resp:  [18] 18  BP: (119)/(76) 119/76    Physical Exam  Vitals and nursing note reviewed.   Constitutional:       Appearance: Normal appearance.   HENT:      Head: Normocephalic and atraumatic.   Eyes:      General: No scleral icterus.     Conjunctiva/sclera: Conjunctivae normal.   Cardiovascular:      Rate and Rhythm: Normal rate.      Heart sounds: Normal heart sounds. No murmur heard.  Pulmonary:      Effort: Pulmonary effort is normal. No respiratory distress.      Breath sounds: Normal breath sounds. No wheezing.   Abdominal:      General: Bowel sounds are normal. There is distension.      Palpations: Abdomen is soft.      Tenderness: There is abdominal tenderness in the right lower quadrant. There is no guarding or  rebound.      Comments: Incisions: clean and dry, appears to be healing well, no erythema/bleeding/discharge from incisions   Neurological:      Mental Status: She is alert.          Result Review   Result Review:  I have personally reviewed the results from the time of this admission to 10/24/2024 22:06 CDT and agree with these findings:  [x]  Laboratory list / accordion  [x]  Microbiology  [x]  Radiology  []  EKG/Telemetry   []  Cardiology/Vascular   []  Pathology  []  Old records  []  Other:  Most notable findings include:   Outside Labs [facility]  WBC 18.8, Hgb 10.4, Hct 32.4,   K+ 3.2  Cr 0.7  UA trace leuks, moderate blood, neg nitrite  Respiratory panel negative    CBC          11/2/2023    15:00 10/7/2024    16:04 10/24/2024    20:39   CBC   WBC 4.98  5.68  18.66    RBC 4.94  4.40  4.10    Hemoglobin 12.5  10.1  9.5    Hematocrit 40.0  33.7  30.6    MCV 81.0  76.6  74.6    MCH 25.3  23.0  23.2    MCHC 31.3  30.0  31.0    RDW 15.7  17.0  17.6    Platelets 331  359  362      CMP          11/2/2023    15:00 10/24/2024    20:39   CMP   Glucose 90  114    BUN 10  6    Creatinine 0.75  0.64    EGFR  110.5    Sodium 141  137    Potassium 4.6  3.4    Chloride 106  104    Calcium 9.4  8.0    Total Protein 7.0     Total Protein  6.4    Albumin 4.6  3.3    Globulin 2.4     Globulin  3.1    Total Bilirubin 0.4  0.7    Alkaline Phosphatase 44  49    AST (SGOT) 16  9    ALT (SGPT) 9  7    Albumin/Globulin Ratio  1.1    BUN/Creatinine Ratio 13.3  9.4    Anion Gap  12.0        CT Outside Films (10/24/2024 21:21)   Fluid collection and fecal-like material in the low pelvis measuring 12 x 7.5 x 6.6 cm. Suspicious for abscess. Irregularity and stricture of the sigmoid colon adjacent to the collection which could be from inflammation or perforation. Cecum and distal ileum lie adjacent to the collection. Thickened walls of the distal ileum. Urinary bladder walls appear thickened with surrounding inflammation and air-fluid  level in the bladder. Cannot exclude fistulous connection. Air tracking in the right side of the abdomen and pelvis in the musculature and outside musculature.     XR Outside Films (10/24/2024 21:21) \negative, right lung moderately low volume. Left lung well expanded, both clear    Assessment & Plan  Assessment / Plan     44 year-old POD# from robot TLH/BSO admitted with pelvic abscess. Evaluation shows a 12 x 7.5 x 6.6 cm fluid collection with fecal-like material in the pelvis along with stricture and irregularity of the sigmoid colon. Labs with leukocytosis. Febrile at home and at [facility]. Afebrile currently and on Zosyn. Exam of patient shows RLQ tenderness, not acute abdomen but this is in the setting of dilaudid. She is passing flatus and voiding without difficulty. Discussion with General Surgery, Dr. Rdz. Dr. Rdz coming in to evaluate patient. With CT read, he expressed concerns about the read and that surgical evaluation is warranted. Discussed with patient the above. Surgery reviewed with patient. Discussed plan for initial diagnostic laparoscopy with intent to stay minimally invasive but discussed laparotomy is a possibility. If colon is involved discussed that colostomy is a possibility. Risks of surgery reviewed. Questions answered. She expressed understanding and is in agreement.     Intra-abdominal pelvic abscess  -admit to GYN  -NPO  -continue zosyn  -blood cultures pending  -daily labs  -to Main OR for surgical evaluation w/General Surgery    Surgical Counseling  The patient was informed of the risks and benefits of the planned procedures. The risks included but were not limited to: bleeding, infection, injury to surrounding structures potentially including the vascular, gastrointestinal, and genitourinary systems, nerve injury, possible blood transfusion and its associated risks. Patient was counseled on the possibility of a laparotomy, and all other indicated procedures. We discussed the  possibility of difficulties with anesthesia, potential exacerbations to other health conditions, and potential concern for chronic pain that follows any surgery. Discussed recovery expectations as well as limitations following surgery. Patient expressed understanding of the risks involved with surgery as well as the surgery itself. Her questions were answered to her satisfaction. No guarantees were made or implied. The patient consented to proceed with the planned procedures.        Sami Villalobos MD  10/24/2024  22:06 CDT      Electronically signed by Sami Villalobos MD at 10/24/24 2215       Vital Signs (last day)       Date/Time Temp Temp src Pulse Resp BP Patient Position SpO2    10/25/24 0855 98.8 (37.1) Temporal 95 16 102/59 Sitting 98    10/25/24 0328 98.3 (36.8) Oral 84 16 98/51 Lying 95    10/25/24 0240 98.4 (36.9) Oral 91 18 108/57 Sitting 96    10/25/24 0206 99 (37.2) Temporal 93 16 109/58 Lying 96    10/25/24 0155 99.5 (37.5) Temporal -- 16 -- -- --    10/25/24 0130 -- -- 110 20 127/78 -- 100    10/25/24 0115 -- -- 112 18 145/40 -- 94    10/25/24 0110 -- -- 102 18 113/84 -- 100    10/25/24 0105 98.1 (36.7) Temporal 88 18 98/72 -- 100    10/24/24 2013 98.3 (36.8) Oral 76 18 119/76 Sitting 98          Current Facility-Administered Medications   Medication Dose Route Frequency Provider Last Rate Last Admin    acetaminophen (TYLENOL) tablet 325 mg  325 mg Oral Q8H Goran Rdz MD   325 mg at 10/25/24 0921    dexAMETHasone (DECADRON) injection 4 mg  4 mg Intravenous Q8H PRN Goran Rdz MD        diphenhydrAMINE (BENADRYL) injection 25 mg  25 mg Intravenous Q6H PRN Goran Rdz MD        HYDROmorphone (DILAUDID) injection 0.5 mg  0.5 mg Intravenous Q2H PRN Goran Rdz MD        And    naloxone (NARCAN) injection 0.1 mg  0.1 mg Intravenous Q5 Min PRN Goran Rdz MD        labetalol (NORMODYNE,TRANDATE) injection 10 mg  10 mg Intravenous Q30 Min PRN Goran Rdz MD         lactated ringers infusion  75 mL/hr Intravenous Continuous Goran Rdz  mL/hr at 10/25/24 0210 125 mL/hr at 10/25/24 0210    ondansetron (ZOFRAN) 8 mg/50 mL NS IVPB  8 mg Intravenous Q6H PRN Goran Rdz MD        ondansetron (ZOFRAN) injection 4 mg  4 mg Intravenous Q6H PRN Goran Rdz MD        oxyCODONE (ROXICODONE) immediate release tablet 5 mg  5 mg Oral Q4H PRN Goran Rdz MD        piperacillin-tazobactam (ZOSYN) 3.375 g IVPB in 100 mL NS MBP (CD)  3.375 g Intravenous Q8H Goran Rdz MD   3.375 g at 10/25/24 0921    scopolamine patch 1 mg/72 hr  1 patch Transdermal Q72H Goran Rdz MD        simethicone (MYLICON) chewable tablet 40 mg  40 mg Oral 4x Daily PRN Goran Rdz MD        sodium chloride 0.9 % flush 1-10 mL  1-10 mL Intravenous PRN Goran Rdz MD        sodium chloride 0.9 % flush 10 mL  10 mL Intravenous Q12H Goran Rdz MD   10 mL at 10/25/24 0244     Lab Results (last 24 hours)       Procedure Component Value Units Date/Time    CBC & Differential [[id number]]  (Abnormal) Collected: 10/25/24 0828    Specimen: Blood Updated: 10/25/24 0928    Narrative:      The following orders were created for panel order CBC & Differential.  Procedure                               Abnormality         Status                     ---------                               -----------         ------                     CBC Auto Differential[[id number]]        Abnormal            Final result                 Please view results for these tests on the individual orders.    CBC Auto Differential [[id number]]  (Abnormal) Collected: 10/25/24 0828    Specimen: Blood Updated: 10/25/24 0928     WBC 13.20 10*3/mm3      RBC 4.14 10*6/mm3      Hemoglobin 9.5 g/dL      Hematocrit 30.9 %      MCV 74.6 fL      MCH 22.9 pg      MCHC 30.7 g/dL      RDW 17.8 %      RDW-SD 47.8 fl      MPV 11.9 fL      Platelets 355 10*3/mm3     Narrative:      The previously reported component NRBC  is no longer being reported. Previous result was 0.0 /100 WBC (Reference Range: 0.0-0.2 /100 WBC) on 10/25/2024 at 0906 CDT.    Manual Differential [[id number]]  (Abnormal) Collected: 10/25/24 0828    Specimen: Blood Updated: 10/25/24 0928     Neutrophil % 71.3 %      Lymphocyte % 2.0 %      Monocyte % 2.0 %      Eosinophil % 0.0 %      Basophil % 0.0 %      Bands %  22.8 %      Metamyelocyte % 2.0 %      Neutrophils Absolute 12.42 10*3/mm3      Lymphocytes Absolute 0.26 10*3/mm3      Monocytes Absolute 0.26 10*3/mm3      Eosinophils Absolute 0.00 10*3/mm3      Basophils Absolute 0.00 10*3/mm3      Anisocytosis Slight/1+     Crenated RBC's Slight/1+     Dacrocytes Slight/1+     Microcytes Slight/1+     Ovalocytes Slight/1+     Poikilocytes Slight/1+     Polychromasia Slight/1+     Toxic Granulation Slight/1+     Vacuolated Neutrophils Slight/1+     Clumped Platelets Present    Comprehensive Metabolic Panel [[id number]]  (Abnormal) Collected: 10/25/24 0828    Specimen: Blood Updated: 10/25/24 0927     Glucose 184 mg/dL      BUN 8 mg/dL      Creatinine 0.62 mg/dL      Sodium 138 mmol/L      Potassium 3.6 mmol/L      Chloride 105 mmol/L      CO2 20.0 mmol/L      Calcium 8.2 mg/dL      Total Protein 5.7 g/dL      Albumin 2.8 g/dL      ALT (SGPT) 5 U/L      AST (SGOT) 7 U/L      Alkaline Phosphatase 46 U/L      Total Bilirubin 0.7 mg/dL      Globulin 2.9 gm/dL      A/G Ratio 1.0 g/dL      BUN/Creatinine Ratio 12.9     Anion Gap 13.0 mmol/L      eGFR 111.4 mL/min/1.73     Narrative:      GFR Normal >60  Chronic Kidney Disease <60  Kidney Failure <15      Body Fluid Culture - Body Fluid, Pelvis [[id number]] Collected: 10/24/24 2327    Specimen: Body Fluid from Pelvis Updated: 10/25/24 0204     Body Fluid Culture Abnormal Stain     Gram Stain Many (4+) WBCs per low power field      Few (2+) Gram positive cocci      Few (2+) Gram negative bacilli    Anaerobic Culture - Body Fluid, Pelvis [[id number]] Collected: 10/24/24 8804     "Specimen: Body Fluid from Pelvis Updated: 10/25/24 0106    Procalcitonin [[id number]]  (Normal) Collected: 10/24/24 2039    Specimen: Blood Updated: 10/24/24 2125     Procalcitonin 0.17 ng/mL     Narrative:      As a Marker for Sepsis (Non-Neonates):    1. <0.5 ng/mL represents a low risk of severe sepsis and/or septic shock.  2. >2 ng/mL represents a high risk of severe sepsis and/or septic shock.    As a Marker for Lower Respiratory Tract Infections that require antibiotic therapy:    PCT on Admission    Antibiotic Therapy       6-12 Hrs later    >0.5                Strongly Recommended  >0.25 - <0.5        Recommended   0.1 - 0.25          Discouraged              Remeasure/reassess PCT  <0.1                Strongly Discouraged     Remeasure/reassess PCT    As 28 day mortality risk marker: \"Change in Procalcitonin Result\" (>80% or <=80%) if Day 0 (or Day 1) and Day 4 values are available. Refer to http://www.[facility] [place]calculator[place]    Change in PCT <=80%  A decrease of PCT levels below or equal to 80% defines a positive change in PCT test result representing a higher risk for 28-day all-cause mortality of patients diagnosed with severe sepsis for septic shock.    Change in PCT >80%  A decrease of PCT levels of more than 80% defines a negative change in PCT result representing a lower risk for 28-day all-cause mortality of patients diagnosed with severe sepsis or septic shock.       Comprehensive Metabolic Panel [[id number]]  (Abnormal) Collected: 10/24/24 2039    Specimen: Blood Updated: 10/24/24 2119     Glucose 114 mg/dL      BUN 6 mg/dL      Creatinine 0.64 mg/dL      Sodium 137 mmol/L      Potassium 3.4 mmol/L      Chloride 104 mmol/L      CO2 21.0 mmol/L      Calcium 8.0 mg/dL      Total Protein 6.4 g/dL      Albumin 3.3 g/dL      ALT (SGPT) 7 U/L      AST (SGOT) 9 U/L      Alkaline Phosphatase 49 U/L      Total Bilirubin 0.7 mg/dL      Globulin 3.1 gm/dL      A/G Ratio 1.1 g/dL      BUN/Creatinine Ratio 9.4 "     Anion Gap 12.0 mmol/L      eGFR 110.5 mL/min/1.73     Narrative:      GFR Normal >60  Chronic Kidney Disease <60  Kidney Failure <15      Magnesium [[id number]]  (Normal) Collected: 10/24/24 2039    Specimen: Blood Updated: 10/24/24 2119     Magnesium 1.8 mg/dL     Lactic Acid, Plasma [[id number]]  (Normal) Collected: 10/24/24 2045    Specimen: Blood Updated: 10/24/24 2117     Lactate 0.7 mmol/L     Phosphorus [[id number]]  (Normal) Collected: 10/24/24 2039    Specimen: Blood Updated: 10/24/24 2116     Phosphorus 3.2 mg/dL     CBC & Differential [[id number]]  (Abnormal) Collected: 10/24/24 2039    Specimen: Blood Updated: 10/24/24 2107    Narrative:      The following orders were created for panel order CBC & Differential.  Procedure                               Abnormality         Status                     ---------                               -----------         ------                     CBC Auto Differential[[id number]]        Abnormal            Final result                 Please view results for these tests on the individual orders.    CBC Auto Differential [[id number]]  (Abnormal) Collected: 10/24/24 2039    Specimen: Blood Updated: 10/24/24 2107     WBC 18.66 10*3/mm3      RBC 4.10 10*6/mm3      Hemoglobin 9.5 g/dL      Hematocrit 30.6 %      MCV 74.6 fL      MCH 23.2 pg      MCHC 31.0 g/dL      RDW 17.6 %      RDW-SD 47.7 fl      MPV 11.4 fL      Platelets 362 10*3/mm3      Neutrophil % 82.9 %      Lymphocyte % 8.7 %      Monocyte % 7.0 %      Eosinophil % 0.2 %      Basophil % 0.2 %      Immature Grans % 1.0 %      Neutrophils, Absolute 15.48 10*3/mm3      Lymphocytes, Absolute 1.62 10*3/mm3      Monocytes, Absolute 1.30 10*3/mm3      Eosinophils, Absolute 0.03 10*3/mm3      Basophils, Absolute 0.04 10*3/mm3      Immature Grans, Absolute 0.19 10*3/mm3      nRBC 0.0 /100 WBC     Blood Culture - Blood, Arm, Left [[id number]] Collected: 10/24/24 2045    Specimen: Blood from Arm, Left Updated: 10/24/24 2102     Blood Culture - Blood, Arm, Right [[id number]] Collected: 10/24/24 2039    Specimen: Blood from Arm, Right Updated: 10/24/24 2102             Operative/Procedure Notes (last 48 hours)        Goran Rdz MD at 10/24/24 2310          DIAGNOSTIC LAPAROSCOPY  Progress Note    Aubree Alegria  10/24/2024 - 10/25/2024    Pre-op Diagnosis:   Intra-abdominal abscess       Post-Op Diagnosis Codes:     * Abscess, intra-abdominal, postoperative [T81.43XA, K65.1]    Procedure/CPT® Codes:  KY EXPLORATORY OF ABDOMEN [90505]  KY PROCTOSIGMOIDOSCOPY,BIOPSY [21750]      Procedure(s):  DIAGNOSTIC LAPAROSCOPY                Surgeon(s):  Sami Villalobos MD Davis, Anthony K, MD Savells, Amber, MD    Anesthesia: General    Staff:   Circulator: Mesha Glover RN; David Fihser RN  Scrub Person: Long Davidson; Gina Bowen; Diana Benz; Sarkis Valdez         Estimated Blood Loss: minimal    Urine Voided: * No values recorded between 10/24/2024 10:49 PM and 10/25/2024  1:00 AM *    Specimens:                Specimens       ID Source Type Tests Collected By Collected At Frozen?    1 Pelvis Body Fluid ANAEROBIC CULTURE  BODY FLUID CULTURE   Sami Villalobos MD 10/24/24 2327     Description: Pelvic abscess fluid culture    This specimen was not marked as sent.                  Drains:   Closed/Suction Drain 1 RLQ Bulb (Active)       Urethral Catheter Double-lumen;Non-latex;Silicone 16 Fr. (Active)       [REMOVED] Urethral Catheter Double-lumen 16 Fr. (Removed)   Daily Indications Selected surgeries ( tract, abdomen) 10/21/24 1123   Collection Container Standard drainage bag 10/21/24 1123   Securement Method Securing device 10/21/24 1123   Output (mL) 250 mL 10/21/24 1123       Findings: Intra-abdominal abscess with exudative rind on small bowel and sigmoid colon        Complications: None         Goran Rdz MD     Date: 10/25/2024  Time: 01:04 CDT          Electronically signed by Goran Rdz MD at 10/25/24  0105       Goran Rdz MD at 10/24/24 2459          Exploratory laparoscopy with intraoperative colonoscopy     Preoperative diagnosis: Intra-abdominal abscess    Postoperative diagnosis: as above      Indications: The patient was admitted to the hospital with presentation of an acute abdominal pain and CT findings concerning of an intra-abdominal abscess with fecal like material, and compressive effects on sigmoid colon.         Surgeon: Goran Rdz MD,   Kathy Marie MD     Assistants: Nabila    Anesthesia: General endotracheal anesthesia    ASA Class: 3, 4          Procedure Details       After the patient was explained the alternatives, benefits, complications, and risks of an exploratory laparoscopy possible open including the risk of intra-abdominal organ injury such as small bowel and colon, postoperative infection as well as postoperative wound issues such as herniations was explained to the patient informed consent was provided and signed.  The patient was brought to the OR suite after receiving preoperative antibiotics, medications and SCDs.  The patient was placed under general anesthesia.  Valencia catheter was placed under a sterile fashion.  The patient was then prepped and draped in standard fashion.  We performed a surgical timeout.  I proceeded with a Veress needle approach in the left upper/subcostal location.  The Veress needle was used to insufflate the abdominal cavity to a pressure of 15 mmHg.  This was followed by placement of a 5 mm incision which was followed by placement of a 5 mm trocar into the abdominal cavity with camera assist in the left upper quadrant incision.      The Veress needle was identified and removed safely.  An 8 mm incision was made in the previous right upper quadrant incision location for placement of an 8 mm trocar under direct visualization.  An 8 mm trocar was placed on the patient's in the previous supraumbilical incision site under direct visualization.    The patient was placed in Trendelenburg position and rotated to the patient's left side.   There was significant inflammatory changes and adhesions in the pelvis from her previous laparoscopic hysterectomy procedure and bilateral oophorectomy procedure.  The exudative fluid and tissue rind was bluntly removed to expose the sigmoid colon.  The sigmoid colon had small bowel tethered to this due to the inflammatory adhesions.  This was bluntly freed and upon freeing and dissecting the sigmoid colon away from the small bowel I entered the abscess cavity.  This fluid was suctioned and sent for cultures and sensitivity.  Continued my dissection to mobilize the small bowel and run the small bowel to the ileocecal junction.  The patient had a previous appendectomy.    We then irrigated the abdominal cavity with copious amounts of saline infused with antibiotic(Ancef).  Once the bowel was freed away from the sigmoid colon I inspected the sigmoid colon extraluminally and then proceeded with a sigmoidoscopy.  Digital rectal exam was performed and within normal limits.  Stool was noted in the rectal vault which was evacuated manually.  The sigmoidoscopy was challenging due to the stool.  I insufflated the sigmoid colon and rectum and on the laparoscopic side and this bowel was submerged under the saline which showed no evidence of air bubbles or leakage.  Saline fluid was suctioned away from the abdomen.  Through the right lower quadrant 8 mm trocar I placed a 10 flat DANNA drain and positioned it in the pelvis.  The DANNA drain was secured with 2-0 silk to the skin.  Then re-locally anesthetized skin wounds for closure.  Skin wounds were closed with 2-0 nylon.  Prior to closing skin wounds all lap pads and attachments were accounted for at the end of the procedure.  [place] was discontinued.      Findings: Abdominal abscess and adhesions    Estimated Blood Loss:  minimal           Drains: 10 flat DANNA drain                     Specimens:   Specimens       ID Source Type Tests Collected By Collected At Frozen?    1 Pelvis Body Fluid ANAEROBIC CULTURE  BODY FLUID CULTURE   Sami Villalobos MD 10/24/24 0449     Description: Pelvic abscess fluid culture    This specimen was not marked as sent.                          Complications: None           Disposition: PACU - hemodynamically stable.           Condition: stable     Electronically signed by Goran Rdz MD at 10/25/24 1109          Physician Progress Notes (last 24 hours)        Goran Rdz MD at 10/25/24 1051          Patient Care Team:  Rashawn Mcgovern MD as PCP - General (Family Medicine)    Subjective     Aubree Alegria is POD 1 day ago from exploratory laparoscopy and sigmoidoscopy.  Patient states she feels much better today.  Has passed some flatus and denies nausea or vomiting.  Her nausea has improved since her aches appearance of nausea yesterday and the prior days.  She is ambulating and tolerating clears.       Review of Systems:     Review of Systems - General ROS: positive for  - fatigue  Respiratory ROS: no cough, shortness of breath, or wheezing  Cardiovascular ROS: no chest pain or dyspnea on exertion  Gastrointestinal ROS: positive for - abdominal pain  negative for - nausea/vomiting    Objective     Vital Signs  /59 (BP Location: Left arm, Patient Position: Sitting)   Pulse 95   Temp 98.8 °F (37.1 °C) (Temporal)   Resp 16   LMP 10/07/2024 (Approximate) Comment: Neg HCG  SpO2 98%     Physical Exam:  Physical Exam  Abdominal:      General: A surgical scar is present.      Palpations: Abdomen is soft.      Tenderness: There is abdominal tenderness. There is no guarding or rebound.          Comments: DANNA drain in place.  Serosanguineous fluid with mild exudative color and appearance.  Appropriate tenderness at incision sites.           Results Review:    Labs reviewed  Lab Results (last 24 hours)       Procedure Component Value Units Date/Time     CBC & Differential [[id number]]  (Abnormal) Collected: 10/25/24 0828    Specimen: Blood Updated: 10/25/24 0928    Narrative:      The following orders were created for panel order CBC & Differential.  Procedure                               Abnormality         Status                     ---------                               -----------         ------                     CBC Auto Differential[[id number]]        Abnormal            Final result                 Please view results for these tests on the individual orders.    CBC Auto Differential [[id number]]  (Abnormal) Collected: 10/25/24 0828    Specimen: Blood Updated: 10/25/24 0928     WBC 13.20 10*3/mm3      RBC 4.14 10*6/mm3      Hemoglobin 9.5 g/dL      Hematocrit 30.9 %      MCV 74.6 fL      MCH 22.9 pg      MCHC 30.7 g/dL      RDW 17.8 %      RDW-SD 47.8 fl      MPV 11.9 fL      Platelets 355 10*3/mm3     Narrative:      The previously reported component NRBC is no longer being reported. Previous result was 0.0 /100 WBC (Reference Range: 0.0-0.2 /100 WBC) on 10/25/2024 at 0906 CDT.    Manual Differential [[id number]]  (Abnormal) Collected: 10/25/24 0828    Specimen: Blood Updated: 10/25/24 0928     Neutrophil % 71.3 %      Lymphocyte % 2.0 %      Monocyte % 2.0 %      Eosinophil % 0.0 %      Basophil % 0.0 %      Bands %  22.8 %      Metamyelocyte % 2.0 %      Neutrophils Absolute 12.42 10*3/mm3      Lymphocytes Absolute 0.26 10*3/mm3      Monocytes Absolute 0.26 10*3/mm3      Eosinophils Absolute 0.00 10*3/mm3      Basophils Absolute 0.00 10*3/mm3      Anisocytosis Slight/1+     Crenated RBC's Slight/1+     Dacrocytes Slight/1+     Microcytes Slight/1+     Ovalocytes Slight/1+     Poikilocytes Slight/1+     Polychromasia Slight/1+     Toxic Granulation Slight/1+     Vacuolated Neutrophils Slight/1+     Clumped Platelets Present    Comprehensive Metabolic Panel [[id number]]  (Abnormal) Collected: 10/25/24 0828    Specimen: Blood Updated: 10/25/24 0927      "Glucose 184 mg/dL      BUN 8 mg/dL      Creatinine 0.62 mg/dL      Sodium 138 mmol/L      Potassium 3.6 mmol/L      Chloride 105 mmol/L      CO2 20.0 mmol/L      Calcium 8.2 mg/dL      Total Protein 5.7 g/dL      Albumin 2.8 g/dL      ALT (SGPT) 5 U/L      AST (SGOT) 7 U/L      Alkaline Phosphatase 46 U/L      Total Bilirubin 0.7 mg/dL      Globulin 2.9 gm/dL      A/G Ratio 1.0 g/dL      BUN/Creatinine Ratio 12.9     Anion Gap 13.0 mmol/L      eGFR 111.4 mL/min/1.73     Narrative:      GFR Normal >60  Chronic Kidney Disease <60  Kidney Failure <15      Body Fluid Culture - Body Fluid, Pelvis [[id number]] Collected: 10/24/24 2327    Specimen: Body Fluid from Pelvis Updated: 10/25/24 0549     Body Fluid Culture Abnormal Stain     Gram Stain Many (4+) WBCs per low power field      Few (2+) Gram positive cocci      Few (2+) Gram negative bacilli    Anaerobic Culture - Body Fluid, Pelvis [[id number]] Collected: 10/24/24 2327    Specimen: Body Fluid from Pelvis Updated: 10/25/24 0106    Procalcitonin [[id number]]  (Normal) Collected: 10/24/24 2039    Specimen: Blood Updated: 10/24/24 2125     Procalcitonin 0.17 ng/mL     Narrative:      As a Marker for Sepsis (Non-Neonates):    1. <0.5 ng/mL represents a low risk of severe sepsis and/or septic shock.  2. >2 ng/mL represents a high risk of severe sepsis and/or septic shock.    As a Marker for Lower Respiratory Tract Infections that require antibiotic therapy:    PCT on Admission    Antibiotic Therapy       6-12 Hrs later    >0.5                Strongly Recommended  >0.25 - <0.5        Recommended   0.1 - 0.25          Discouraged              Remeasure/reassess PCT  <0.1                Strongly Discouraged     Remeasure/reassess PCT    As 28 day mortality risk marker: \"Change in Procalcitonin Result\" (>80% or <=80%) if Day 0 (or Day 1) and Day 4 values are available. Refer to http://[email or web address]-pct-[email or web address]    Change in PCT <=80%  A decrease of PCT levels below or equal " to 80% defines a positive change in PCT test result representing a higher risk for 28-day all-cause mortality of patients diagnosed with severe sepsis for septic shock.    Change in PCT >80%  A decrease of PCT levels of more than 80% defines a negative change in PCT result representing a lower risk for 28-day all-cause mortality of patients diagnosed with severe sepsis or septic shock.       Comprehensive Metabolic Panel [[id number]]  (Abnormal) Collected: 10/24/24 2039    Specimen: Blood Updated: 10/24/24 2119     Glucose 114 mg/dL      BUN 6 mg/dL      Creatinine 0.64 mg/dL      Sodium 137 mmol/L      Potassium 3.4 mmol/L      Chloride 104 mmol/L      CO2 21.0 mmol/L      Calcium 8.0 mg/dL      Total Protein 6.4 g/dL      Albumin 3.3 g/dL      ALT (SGPT) 7 U/L      AST (SGOT) 9 U/L      Alkaline Phosphatase 49 U/L      Total Bilirubin 0.7 mg/dL      Globulin 3.1 gm/dL      A/G Ratio 1.1 g/dL      BUN/Creatinine Ratio 9.4     Anion Gap 12.0 mmol/L      eGFR 110.5 mL/min/1.73     Narrative:      GFR Normal >60  Chronic Kidney Disease <60  Kidney Failure <15      Magnesium [[id number]]  (Normal) Collected: 10/24/24 2039    Specimen: Blood Updated: 10/24/24 2119     Magnesium 1.8 mg/dL     Lactic Acid, Plasma [[id number]]  (Normal) Collected: 10/24/24 2045    Specimen: Blood Updated: 10/24/24 2117     Lactate 0.7 mmol/L     Phosphorus [[id number]]  (Normal) Collected: 10/24/24 2039    Specimen: Blood Updated: 10/24/24 2116     Phosphorus 3.2 mg/dL     CBC & Differential [[id number]]  (Abnormal) Collected: 10/24/24 2039    Specimen: Blood Updated: 10/24/24 2107    Narrative:      The following orders were created for panel order CBC & Differential.  Procedure                               Abnormality         Status                     ---------                               -----------         ------                     CBC Auto Differential[[id number]]        Abnormal            Final result                 Please view  results for these tests on the individual orders.    CBC Auto Differential [[id number]]  (Abnormal) Collected: 10/24/24 2039    Specimen: Blood Updated: 10/24/24 2107     WBC 18.66 10*3/mm3      RBC 4.10 10*6/mm3      Hemoglobin 9.5 g/dL      Hematocrit 30.6 %      MCV 74.6 fL      MCH 23.2 pg      MCHC 31.0 g/dL      RDW 17.6 %      RDW-SD 47.7 fl      MPV 11.4 fL      Platelets 362 10*3/mm3      Neutrophil % 82.9 %      Lymphocyte % 8.7 %      Monocyte % 7.0 %      Eosinophil % 0.2 %      Basophil % 0.2 %      Immature Grans % 1.0 %      Neutrophils, Absolute 15.48 10*3/mm3      Lymphocytes, Absolute 1.62 10*3/mm3      Monocytes, Absolute 1.30 10*3/mm3      Eosinophils, Absolute 0.03 10*3/mm3      Basophils, Absolute 0.04 10*3/mm3      Immature Grans, Absolute 0.19 10*3/mm3      nRBC 0.0 /100 WBC     Blood Culture - Blood, Arm, Left [[id number]] Collected: 10/24/24 2045    Specimen: Blood from Arm, Left Updated: 10/24/24 2102    Blood Culture - Blood, Arm, Right [[id number]] Collected: 10/24/24 2039    Specimen: Blood from Arm, Right Updated: 10/24/24 2102              Medication Reviewed:   Current Facility-Administered Medications   Medication Dose Route Frequency Provider Last Rate Last Admin    acetaminophen (TYLENOL) tablet 325 mg  325 mg Oral Q8H Goran Rdz MD   325 mg at 10/25/24 0921    dexAMETHasone (DECADRON) injection 4 mg  4 mg Intravenous Q8H PRN Goran Rdz MD        diphenhydrAMINE (BENADRYL) injection 25 mg  25 mg Intravenous Q6H PRN Goran Rdz MD        HYDROmorphone (DILAUDID) injection 0.5 mg  0.5 mg Intravenous Q2H PRN Goran Rdz MD        And    naloxone (NARCAN) injection 0.1 mg  0.1 mg Intravenous Q5 Min PRN Goran Rdz MD        labetalol (NORMODYNE,TRANDATE) injection 10 mg  10 mg Intravenous Q30 Min PRN Goran Rdz MD        lactated ringers infusion  125 mL/hr Intravenous Continuous Goran Rdz  mL/hr at 10/25/24 0210 125 mL/hr at 10/25/24  "0210    ondansetron (ZOFRAN) 8 mg/50 mL NS IVPB  8 mg Intravenous Q6H PRN Goran Rdz MD        ondansetron (ZOFRAN) injection 4 mg  4 mg Intravenous Q6H PRN Goran Rdz MD        piperacillin-tazobactam (ZOSYN) 3.375 g IVPB in 100 mL NS MBP (CD)  3.375 g Intravenous Q8H Goran Rdz MD   3.375 g at 10/25/24 0921    scopolamine patch 1 mg/72 hr  1 patch Transdermal Q72H Goran Rdz MD        simethicone (MYLICON) chewable tablet 40 mg  40 mg Oral 4x Daily PRN Goran Rdz MD        sodium chloride 0.9 % flush 1-10 mL  1-10 mL Intravenous PRN Goran Rdz MD        sodium chloride 0.9 % flush 10 mL  10 mL Intravenous Q12H Goran Rdz MD   10 mL at 10/25/24 0244       Assessment & Plan  POD 1 day ago from the above procedure.  Patient remained stable.  Leukocytosis trending down and patient is tolerating p.o.  Awaiting for GI system to progress.  Will start oral pain medication.  Encouraged to continue to ambulate and utilize her incentive spirometer.  Advance diet.    Goran Rdz MD  10/25/24  10:52 CDT      Electronically signed by Goran Rdz MD at 10/25/24 1056       Kathy Marie MD at 10/25/24 0808          [facility]  Aubree Alegria  : 1980  MRN: [id number]  CSN: [id number]    Post-operative Day #4 from hysterectomy and POD #1 diagnostic laparoscopy/Hospital Day #2  Subjective   Patient had a total laparoscopic hysterectomy with bilateral salpingo-oophorectomy on Monday, was readmitted to the hospital last evening.  She presented to [facility] with abdominal pain, nausea/vomiting, and fever.  On CT scan at [facility], the patient was reported to have a large area of feculent material vs. abscess in the abdomen and she was transferred to [facility] for possible bowel injury.  Of note, the patient CT scan also reported her pelvic organs to be \"unremarkable\" .    The patient was taken back to the operating room last " night by general surgery.  No abscess or bowel injury was found.  There was a significant amount of adhesions and edema of all bowel that was in the pelvis.  There was loculated fluid, likely the collection that was seen on CT scan, trapped in the scar tissue forming on the patient's right pelvic sidewall.  This is all described in Dr. Rdz's note.  The fluid that was encountered was mostly clear, although there was hemostatic powder in it which was likely causing it to have the appearance of an abscess on CT.  Fluid was sent for culture.  This will likely the source of her infection, even though it did not have the typical appearance of purulent fluid.  The patient's surgery was completed laparoscopically with just 3 trocar sites.      Patient says she is feeling much better this morning, although still sore from surgery medications being utilized for pain control: acetaminophen.  The patient has Dilaudid ordered, but has not taken any.  Patient reports pain is mostly well-controlled.  She is not passing gas and has not had a bowel movement.  Patient is not having  VB .  Patient denies any difficulty voiding      Objective     Min/max vitals past 24 hours:   Temp  Min: 98.1 °F (36.7 °C)  Max: 99.5 °F (37.5 °C)  The patient has been afebrile since arrival here in [place]  BP  Min: 98/51  Max: 145/40  Pulse  Min: 76  Max: 112  Resp  Min: 16  Max: 20        I/O last 3 completed shifts:  In: 750 [I.V.:750]  Out: 490 [Urine:250; Drains:240]    General: well developed; well nourished  no acute distress   Lungs:  Abdomen: breathing is unlabored  soft, less-tender than last evening prior to surgery  incisions are clean, dry, intact, and without drainage.  J-P drain on R side of lower abdomen   Pelvic: Not performed   Ext: Calves NT     WBC   Date/Time Value Ref Range Status   10/24/2024 2039 18.66 (H) 3.40 - 10.80 10*3/mm3 Final   11/02/2023 1500 4.98 3.40 - 10.80 10*3/mm3 Final     Hemoglobin   Date/Time Value Ref Range  "Status   10/24/2024 2039 9.5 (L) 12.0 - 15.9 g/dL Final     Hematocrit   Date/Time Value Ref Range Status   10/24/2024 2039 30.6 (L) 34.0 - 46.6 % Final     Platelets   Date/Time Value Ref Range Status   10/24/2024 2039 362 140 - 450 10*3/mm3 Final       Assessment   Post-op Day #1 S/P diagnostic laparoscopy with wash-out of intra-abdominal abscess  CBC and CMP pending  Afebrile since admission here in [place]  Patient feeling better, but has not passed flatus    Plan   Continue IV Zosyn  Awaiting return of bowel function.  Patient will likely be discharged home tomorrow on oral antibiotics if bowel function has returned  Appreciate general surgery help with management of this patient    Kathy Marie MD  10/25/2024  08:08 CDT            Electronically signed by Kathy Marie MD at 10/25/24 0820          Consult Notes (last 24 hours)        Goran Rdz MD at 10/24/24 0877          General Surgery     Referring Provider: Tim Moore MD    Patient Care Team:  Rashawn Mcgovern MD as PCP - General (Family Medicine)    Chief complaint: Abdominal pain and fevers    Subjective      History of present illness: The patient is a 44-year-old female status post total hysterectomy with bilateral salpingo-oophorectomy on October 21.  Patient had an uneventful hospital course and was discharged home.  She stated on Wednesday she started having fevers and also right lower quadrant pain.  Fevers persisted and continued to elevate and her abdominal pain did not improve.  She was instructed to visit her local emergency department.  She did so today and was seen at [facility].  She was noted to have elevated temperatures, leukocytosis and abdominal pain.  CT showed signs concerning of a postoperative abscess with \"fecal-like\" material along with a stricture and irregularity of the sigmoid colon.  She was transferred to our facility.    Review of Systems    Review of Systems - General ROS: positive for  - fatigue and " fever  Respiratory ROS: positive for - cough  Cardiovascular ROS: no chest pain or dyspnea on exertion  Gastrointestinal ROS: positive for - abdominal pain, constipation, and gas/bloating     History  Past Medical History:   Diagnosis Date    Spinal headache    ,   Past Surgical History:   Procedure Laterality Date    APPENDECTOMY      BREAST BIOPSY Bilateral     benign     SECTION      x2    ECTOPIC PREGNANCY      TOTAL LAPAROSCOPIC HYSTERECTOMY SALPINGO OOPHORECTOMY Bilateral 10/21/2024    Procedure: TOTAL LAPAROSCOPIC HYSTERECTOMY BILATERAL SALPINGOOPHORECTOMY WITH DAVINCI ROBOT;  Surgeon: Kathy Marie MD;  Location: Health system;  Service: Robotics - DaVinci;  Laterality: Bilateral;    TUBAL ABDOMINAL LIGATION      WISDOM TOOTH EXTRACTION     ,   Family History   Problem Relation Age of Onset    Kidney cancer Maternal Aunt     Hypertension Father     No Known Problems Mother     No Known Problems Sister     Leukemia Paternal Grandmother     Breast cancer Paternal Aunt 40    Ovarian cancer Neg Hx     Uterine cancer Neg Hx     Colon cancer Neg Hx     Melanoma Neg Hx     Prostate cancer Neg Hx    ,   Social History     Tobacco Use    Smoking status: Some Days     Types: Cigarettes    Smokeless tobacco: Never   Vaping Use    Vaping status: Never Used   Substance Use Topics    Alcohol use: Never    Drug use: Never   ,   Medications Prior to Admission   Medication Sig Dispense Refill Last Dose/Taking    oxyCODONE-acetaminophen (Percocet) 5-325 MG per tablet Take 1 tablet by mouth Every 6 (Six) Hours As Needed for Severe Pain. 12 tablet 0 10/24/2024 at  9:00 AM    and Allergies:  Ketorolac tromethamine and Tramadol    Current Facility-Administered Medications:     acetaminophen (TYLENOL) tablet 650 mg, 650 mg, Oral, Q4H PRN **OR** acetaminophen (TYLENOL) 160 MG/5ML oral solution 650 mg, 650 mg, Oral, Q4H PRN **OR** acetaminophen (TYLENOL) suppository 650 mg, 650 mg, Rectal, Q4H PRN, Sami Villalobos MD     sennosides-docusate (PERICOLACE) 8.6-50 MG per tablet 2 tablet, 2 tablet, Oral, BID **AND** polyethylene glycol (MIRALAX) packet 17 g, 17 g, Oral, Daily PRN **AND** bisacodyl (DULCOLAX) EC tablet 5 mg, 5 mg, Oral, Daily PRN **AND** bisacodyl (DULCOLAX) suppository 10 mg, 10 mg, Rectal, Daily PRN, Sami Villalobos MD    calcium carbonate (TUMS) chewable tablet 500 mg (200 mg elemental), 2 tablet, Oral, BID PRN, Sami Villalobos MD    HYDROmorphone (DILAUDID) injection 0.5 mg, 0.5 mg, Intravenous, Q2H PRN **AND** naloxone (NARCAN) injection 0.4 mg, 0.4 mg, Intravenous, Q5 Min PRN, Sami Villalobos MD    lactated ringers infusion, 125 mL/hr, Intravenous, Continuous, Sami Villalobos MD, Last Rate: 125 mL/hr at 10/24/24 2118, 125 mL/hr at 10/24/24 2118    ondansetron ODT (ZOFRAN-ODT) disintegrating tablet 4 mg, 4 mg, Oral, Q6H PRN **OR** ondansetron (ZOFRAN) injection 4 mg, 4 mg, Intravenous, Q6H PRN, Sami Villalobos MD    oxyCODONE (ROXICODONE) immediate release tablet 5 mg, 5 mg, Oral, Q6H PRN, Sami Villalobos MD    [START ON 10/25/2024] piperacillin-tazobactam (ZOSYN) 3.375 g IVPB in 100 mL NS MBP (CD), 3.375 g, Intravenous, Q8H, Sami Villalobos MD    sodium chloride 0.9 % flush 10 mL, 10 mL, Intravenous, Q12H, Sami Villalobos MD, 10 mL at 10/24/24 2122    sodium chloride 0.9 % flush 10 mL, 10 mL, Intravenous, PRN, Sami Villalobos MD    sodium chloride 0.9 % infusion 40 mL, 40 mL, Intravenous, PRN, Sami Villlaobos MD    tiZANidine (ZANAFLEX) tablet 4 mg, 4 mg, Oral, Q12H PRN, Sami Villalobos MD    Objective     Vital Signs   Temp:  [98.3 °F (36.8 °C)] 98.3 °F (36.8 °C)  Heart Rate:  [76] 76  Resp:  [18] 18  BP: (119)/(76) 119/76    Physical Exam:  General appearance - oriented to person, place, and time and in mild to moderate distress  Heart - normal rate and regular rhythm  Abdomen - tenderness noted periumbilical and right lower quadrant.  Positive rebound tenderness.    Results Review:    Lab Results (last 24 hours)   "     Procedure Component Value Units Date/Time    Procalcitonin [[id number]]  (Normal) Collected: 10/24/24 2039    Specimen: Blood Updated: 10/24/24 2125     Procalcitonin 0.17 ng/mL     Narrative:      As a Marker for Sepsis (Non-Neonates):    1. <0.5 ng/mL represents a low risk of severe sepsis and/or septic shock.  2. >2 ng/mL represents a high risk of severe sepsis and/or septic shock.    As a Marker for Lower Respiratory Tract Infections that require antibiotic therapy:    PCT on Admission    Antibiotic Therapy       6-12 Hrs later    >0.5                Strongly Recommended  >0.25 - <0.5        Recommended   0.1 - 0.25          Discouraged              Remeasure/reassess PCT  <0.1                Strongly Discouraged     Remeasure/reassess PCT    As 28 day mortality risk marker: \"Change in Procalcitonin Result\" (>80% or <=80%) if Day 0 (or Day 1) and Day 4 values are available. Refer to http://[email or web address] [email or web address]-pct-calculator.[email or web address]    Change in PCT <=80%  A decrease of PCT levels below or equal to 80% defines a positive change in PCT test result representing a higher risk for 28-day all-cause mortality of patients diagnosed with severe sepsis for septic shock.    Change in PCT >80%  A decrease of PCT levels of more than 80% defines a negative change in PCT result representing a lower risk for 28-day all-cause mortality of patients diagnosed with severe sepsis or septic shock.       Comprehensive Metabolic Panel [[id number]]  (Abnormal) Collected: 10/24/24 2039    Specimen: Blood Updated: 10/24/24 2119     Glucose 114 mg/dL      BUN 6 mg/dL      Creatinine 0.64 mg/dL      Sodium 137 mmol/L      Potassium 3.4 mmol/L      Chloride 104 mmol/L      CO2 21.0 mmol/L      Calcium 8.0 mg/dL      Total Protein 6.4 g/dL      Albumin 3.3 g/dL      ALT (SGPT) 7 U/L      AST (SGOT) 9 U/L      Alkaline Phosphatase 49 U/L      Total Bilirubin 0.7 mg/dL      Globulin 3.1 gm/dL      A/G Ratio 1.1 g/dL      BUN/Creatinine Ratio 9.4     " Anion Gap 12.0 mmol/L      eGFR 110.5 mL/min/1.73     Narrative:      GFR Normal >60  Chronic Kidney Disease <60  Kidney Failure <15      Magnesium [[id number]]  (Normal) Collected: 10/24/24 2039    Specimen: Blood Updated: 10/24/24 2119     Magnesium 1.8 mg/dL     Lactic Acid, Plasma [[id number]]  (Normal) Collected: 10/24/24 2045    Specimen: Blood Updated: 10/24/24 2117     Lactate 0.7 mmol/L     Phosphorus [[id number]]  (Normal) Collected: 10/24/24 2039    Specimen: Blood Updated: 10/24/24 2116     Phosphorus 3.2 mg/dL     CBC & Differential [[id number]]  (Abnormal) Collected: 10/24/24 2039    Specimen: Blood Updated: 10/24/24 2107    Narrative:      The following orders were created for panel order CBC & Differential.  Procedure                               Abnormality         Status                     ---------                               -----------         ------                     CBC Auto Differential[[id number]]        Abnormal            Final result                 Please view results for these tests on the individual orders.    CBC Auto Differential [[id number]]  (Abnormal) Collected: 10/24/24 2039    Specimen: Blood Updated: 10/24/24 2107     WBC 18.66 10*3/mm3      RBC 4.10 10*6/mm3      Hemoglobin 9.5 g/dL      Hematocrit 30.6 %      MCV 74.6 fL      MCH 23.2 pg      MCHC 31.0 g/dL      RDW 17.6 %      RDW-SD 47.7 fl      MPV 11.4 fL      Platelets 362 10*3/mm3      Neutrophil % 82.9 %      Lymphocyte % 8.7 %      Monocyte % 7.0 %      Eosinophil % 0.2 %      Basophil % 0.2 %      Immature Grans % 1.0 %      Neutrophils, Absolute 15.48 10*3/mm3      Lymphocytes, Absolute 1.62 10*3/mm3      Monocytes, Absolute 1.30 10*3/mm3      Eosinophils, Absolute 0.03 10*3/mm3      Basophils, Absolute 0.04 10*3/mm3      Immature Grans, Absolute 0.19 10*3/mm3      nRBC 0.0 /100 WBC     Blood Culture - Blood, Arm, Left [[id number]] Collected: 10/24/24 2045    Specimen: Blood from Arm, Left Updated: 10/24/24 2102     Blood Culture - Blood, Arm, Right [[id number]] Collected: 10/24/24 2039    Specimen: Blood from Arm, Right Updated: 10/24/24 2102          Imaging Results (Last 24 Hours)       Procedure Component Value Units Date/Time    XR Outside Films [[id number]] Resulted: 10/24/24 2121     Updated: 10/24/24 2121    Narrative:      This procedure was auto-finalized with no dictation required.    CT Outside Films [[id number]] Resulted: 10/24/24 2121     Updated: 10/24/24 2121    Narrative:      This procedure was auto-finalized with no dictation required.              Assessment & Plan       Postoperative intra-abdominal abscess      Plan: Exploratory laparoscopy possible laparotomy with robotic assist.  I explained to the patient that based off the CT findings and report there is a chance that her colon is involved or injured.  If this is the case she would require resection of colon and a colostomy.  Alternatives is observation, benefits is exploration and evacuation of the abscess and potential mass effect as well as examining the intestines, identification of possible infection source, complications and risks include risk of intra-abdominal organ injury, bowel injury, bleeding and prolonged hospital stay.  Informed consent was obtained and patient was sent to the OR.      Goran Rdz MD  10/24/24  22:18 CDT            Electronically signed by Goran Rdz MD at 10/24/24 2227     PRN 10/25 Dilaudid iv x1 so far today

## 2024-10-25 NOTE — BRIEF OP NOTE
DIAGNOSTIC LAPAROSCOPY  Progress Note    Aubree Alegria  10/24/2024 - 10/25/2024    Pre-op Diagnosis:   Intra-abdominal abscess       Post-Op Diagnosis Codes:     * Abscess, intra-abdominal, postoperative [T81.43XA, K65.1]    Procedure/CPT® Codes:  OH EXPLORATORY OF ABDOMEN [05500]  OH PROCTOSIGMOIDOSCOPY,BIOPSY [25477]      Procedure(s):  DIAGNOSTIC LAPAROSCOPY                Surgeon(s):  Sami Villalobos MD Davis, Anthony K, MD Savells, Amber, MD    Anesthesia: General    Staff:   Circulator: Mesha Glover, KHANH; David Fisher RN  Scrub Person: Long Davidson; Gina Bowen; Diana Benz; Sarkis Valdez         Estimated Blood Loss: minimal    Urine Voided: * No values recorded between 10/24/2024 10:49 PM and 10/25/2024  1:00 AM *    Specimens:                Specimens       ID Source Type Tests Collected By Collected At Frozen?    1 Pelvis Body Fluid ANAEROBIC CULTURE  BODY FLUID CULTURE   Sami Villalobos MD 10/24/24 7569     Description: Pelvic abscess fluid culture    This specimen was not marked as sent.                  Drains:   Closed/Suction Drain 1 RLQ Bulb (Active)       Urethral Catheter Double-lumen;Non-latex;Silicone 16 Fr. (Active)       [REMOVED] Urethral Catheter Double-lumen 16 Fr. (Removed)   Daily Indications Selected surgeries ( tract, abdomen) 10/21/24 1123   Collection Container Standard drainage bag 10/21/24 1123   Securement Method Securing device 10/21/24 1123   Output (mL) 250 mL 10/21/24 1123       Findings: Intra-abdominal abscess with exudative rind on small bowel and sigmoid colon        Complications: None         Goran Rdz MD     Date: 10/25/2024  Time: 01:04 CDT

## 2024-10-25 NOTE — PLAN OF CARE
Goal Outcome Evaluation:  Plan of Care Reviewed With: patient        Progress: improving     VSS, ambulating, DANNA x1 and stripped, scheduled tylenol, IV abx continued. + sepsis screen. Family at bedside. Denied nausea, tolerating sips of clear liquid.

## 2024-10-25 NOTE — PROGRESS NOTES
"[facility]  Aubree Alegria  : 1980  MRN: [id number]  [facility]: [id number]    Post-operative Day #4 from hysterectomy and POD #1 diagnostic laparoscopy/Hospital Day #2  Subjective   Patient had a total laparoscopic hysterectomy with bilateral salpingo-oophorectomy on Monday, was readmitted to the hospital last evening.  She presented to [facility] with abdominal pain, nausea/vomiting, and fever.  On CT scan at [facility], the patient was reported to have a large area of feculent material vs. abscess in the abdomen and she was transferred to [facility] for possible bowel injury.  Of note, the patient CT scan also reported her pelvic organs to be \"unremarkable\" .    The patient was taken back to the operating room last night by general surgery.  No abscess or bowel injury was found.  There was a significant amount of adhesions and edema of all bowel that was in the pelvis.  There was loculated fluid, likely the collection that was seen on CT scan, trapped in the scar tissue forming on the patient's right pelvic sidewall.  This is all described in Dr. Rdz's note.  The fluid that was encountered was mostly clear, although there was hemostatic powder in it which was likely causing it to have the appearance of an abscess on CT.  Fluid was sent for culture.  This will likely the source of her infection, even though it did not have the typical appearance of purulent fluid.  The patient's surgery was completed laparoscopically with just 3 trocar sites.      Patient says she is feeling much better this morning, although still sore from surgery medications being utilized for pain control: acetaminophen.  The patient has Dilaudid ordered, but has not taken any.  Patient reports pain is mostly well-controlled.  She is not passing gas and has not had a bowel movement.  Patient is not having  VB .  Patient denies any difficulty voiding       Objective     Min/max vitals past 24 hours: "   Temp  Min: 98.1 °F (36.7 °C)  Max: 99.5 °F (37.5 °C)  The patient has been afebrile since arrival here in [place]  BP  Min: 98/51  Max: 145/40  Pulse  Min: 76  Max: 112  Resp  Min: 16  Max: 20        I/O last 3 completed shifts:  In: 750 [I.V.:750]  Out: 490 [Urine:250; Drains:240]    General: well developed; well nourished  no acute distress   Lungs:  Abdomen: breathing is unlabored  soft, less-tender than last evening prior to surgery  incisions are clean, dry, intact, and without drainage.  J-P drain on R side of lower abdomen   Pelvic: Not performed   Ext: Calves NT     WBC   Date/Time Value Ref Range Status   10/24/2024 2039 18.66 (H) 3.40 - 10.80 10*3/mm3 Final   11/02/2023 1500 4.98 3.40 - 10.80 10*3/mm3 Final     Hemoglobin   Date/Time Value Ref Range Status   10/24/2024 2039 9.5 (L) 12.0 - 15.9 g/dL Final     Hematocrit   Date/Time Value Ref Range Status   10/24/2024 2039 30.6 (L) 34.0 - 46.6 % Final     Platelets   Date/Time Value Ref Range Status   10/24/2024 2039 362 140 - 450 10*3/mm3 Final        Assessment   Post-op Day #1 S/P diagnostic laparoscopy with wash-out of intra-abdominal abscess  CBC and CMP pending  Afebrile since admission here in [place]  Patient feeling better, but has not passed flatus     Plan   Continue IV Zosyn  Awaiting return of bowel function.  Patient will likely be discharged home tomorrow on oral antibiotics if bowel function has returned  Appreciate general surgery help with management of this patient    Kathy Marie MD  10/25/2024  08:08 CDT

## 2024-10-25 NOTE — CONSULTS
"General Surgery     Referring Provider: Tim Moore MD    Patient Care Team:  Rashawn Mcgovern MD as PCP - General (Family Medicine)    Chief complaint: Abdominal pain and fevers    Subjective      History of present illness: The patient is a 44-year-old female status post total hysterectomy with bilateral salpingo-oophorectomy on .  Patient had an uneventful hospital course and was discharged home.  She stated on Wednesday she started having fevers and also right lower quadrant pain.  Fevers persisted and continued to elevate and her abdominal pain did not improve.  She was instructed to visit her local emergency department.  She did so today and was seen at [facility].  She was noted to have elevated temperatures, leukocytosis and abdominal pain.  CT showed signs concerning of a postoperative abscess with \"fecal-like\" material along with a stricture and irregularity of the sigmoid colon.  She was transferred to our facility.    Review of Systems    Review of Systems - General ROS: positive for  - fatigue and fever  Respiratory ROS: positive for - cough  Cardiovascular ROS: no chest pain or dyspnea on exertion  Gastrointestinal ROS: positive for - abdominal pain, constipation, and gas/bloating     History  Past Medical History:   Diagnosis Date    Spinal headache    ,   Past Surgical History:   Procedure Laterality Date    APPENDECTOMY      BREAST BIOPSY Bilateral     benign     SECTION      x2    ECTOPIC PREGNANCY      TOTAL LAPAROSCOPIC HYSTERECTOMY SALPINGO OOPHORECTOMY Bilateral 10/21/2024    Procedure: TOTAL LAPAROSCOPIC HYSTERECTOMY BILATERAL SALPINGOOPHORECTOMY WITH DAVINCI ROBOT;  Surgeon: Kathy Marie MD;  Location: [facility];  Service: Robotics - DaVinci;  Laterality: Bilateral;    TUBAL ABDOMINAL LIGATION      WISDOM TOOTH EXTRACTION     ,   Family History   Problem Relation Age of Onset    Kidney cancer Maternal Aunt     Hypertension Father     No Known Problems Mother     " No Known Problems Sister     Leukemia Paternal Grandmother     Breast cancer Paternal Aunt 40    Ovarian cancer Neg Hx     Uterine cancer Neg Hx     Colon cancer Neg Hx     Melanoma Neg Hx     Prostate cancer Neg Hx    ,   Social History     Tobacco Use    Smoking status: Some Days     Types: Cigarettes    Smokeless tobacco: Never   Vaping Use    Vaping status: Never Used   Substance Use Topics    Alcohol use: Never    Drug use: Never   ,   Medications Prior to Admission   Medication Sig Dispense Refill Last Dose/Taking    oxyCODONE-acetaminophen (Percocet) 5-325 MG per tablet Take 1 tablet by mouth Every 6 (Six) Hours As Needed for Severe Pain. 12 tablet 0 10/24/2024 at  9:00 AM    and Allergies:  Ketorolac tromethamine and Tramadol    Current Facility-Administered Medications:     acetaminophen (TYLENOL) tablet 650 mg, 650 mg, Oral, Q4H PRN **OR** acetaminophen (TYLENOL) 160 MG/5ML oral solution 650 mg, 650 mg, Oral, Q4H PRN **OR** acetaminophen (TYLENOL) suppository 650 mg, 650 mg, Rectal, Q4H PRN, Sami Villalobos MD    sennosides-docusate (PERICOLACE) 8.6-50 MG per tablet 2 tablet, 2 tablet, Oral, BID **AND** polyethylene glycol (MIRALAX) packet 17 g, 17 g, Oral, Daily PRN **AND** bisacodyl (DULCOLAX) EC tablet 5 mg, 5 mg, Oral, Daily PRN **AND** bisacodyl (DULCOLAX) suppository 10 mg, 10 mg, Rectal, Daily PRN, Sami Villalobos MD    calcium carbonate (TUMS) chewable tablet 500 mg (200 mg elemental), 2 tablet, Oral, BID PRN, Sami Villalobos MD    HYDROmorphone (DILAUDID) injection 0.5 mg, 0.5 mg, Intravenous, Q2H PRN **AND** naloxone (NARCAN) injection 0.4 mg, 0.4 mg, Intravenous, Q5 Min PRN, Sami Villalobos MD    lactated ringers infusion, 125 mL/hr, Intravenous, Continuous, Sami Villalobos MD, Last Rate: 125 mL/hr at 10/24/24 2118, 125 mL/hr at 10/24/24 2118    ondansetron ODT (ZOFRAN-ODT) disintegrating tablet 4 mg, 4 mg, Oral, Q6H PRN **OR** ondansetron (ZOFRAN) injection 4 mg, 4 mg, Intravenous, Q6H PRN,  "Sami Villalobos MD    oxyCODONE (ROXICODONE) immediate release tablet 5 mg, 5 mg, Oral, Q6H PRN, Sami Villalobos MD    [START ON 10/25/2024] piperacillin-tazobactam (ZOSYN) 3.375 g IVPB in 100 mL NS MBP (CD), 3.375 g, Intravenous, Q8H, Sami Villalobos MD    sodium chloride 0.9 % flush 10 mL, 10 mL, Intravenous, Q12H, Sami Villalobos MD, 10 mL at 10/24/24 2122    sodium chloride 0.9 % flush 10 mL, 10 mL, Intravenous, PRN, Sami Villalobos MD    sodium chloride 0.9 % infusion 40 mL, 40 mL, Intravenous, PRN, Sami Villalobos MD    tiZANidine (ZANAFLEX) tablet 4 mg, 4 mg, Oral, Q12H PRN, Sami Villalobos MD    Objective     Vital Signs   Temp:  [98.3 °F (36.8 °C)] 98.3 °F (36.8 °C)  Heart Rate:  [76] 76  Resp:  [18] 18  BP: (119)/(76) 119/76    Physical Exam:  General appearance - oriented to person, place, and time and in mild to moderate distress  Heart - normal rate and regular rhythm  Abdomen - tenderness noted periumbilical and right lower quadrant.  Positive rebound tenderness.    Results Review:    Lab Results (last 24 hours)       Procedure Component Value Units Date/Time    Procalcitonin [[id number]]  (Normal) Collected: 10/24/24 2039    Specimen: Blood Updated: 10/24/24 2125     Procalcitonin 0.17 ng/mL     Narrative:      As a Marker for Sepsis (Non-Neonates):    1. <0.5 ng/mL represents a low risk of severe sepsis and/or septic shock.  2. >2 ng/mL represents a high risk of severe sepsis and/or septic shock.    As a Marker for Lower Respiratory Tract Infections that require antibiotic therapy:    PCT on Admission    Antibiotic Therapy       6-12 Hrs later    >0.5                Strongly Recommended  >0.25 - <0.5        Recommended   0.1 - 0.25          Discouraged              Remeasure/reassess PCT  <0.1                Strongly Discouraged     Remeasure/reassess PCT    As 28 day mortality risk marker: \"Change in Procalcitonin Result\" (>80% or <=80%) if Day 0 (or Day 1) and Day 4 values are available. Refer to " [email or web address]-pct-[email or web address]    Change in PCT <=80%  A decrease of PCT levels below or equal to 80% defines a positive change in PCT test result representing a higher risk for 28-day all-cause mortality of patients diagnosed with severe sepsis for septic shock.    Change in PCT >80%  A decrease of PCT levels of more than 80% defines a negative change in PCT result representing a lower risk for 28-day all-cause mortality of patients diagnosed with severe sepsis or septic shock.       Comprehensive Metabolic Panel [[id number]]  (Abnormal) Collected: 10/24/24 2039    Specimen: Blood Updated: 10/24/24 2119     Glucose 114 mg/dL      BUN 6 mg/dL      Creatinine 0.64 mg/dL      Sodium 137 mmol/L      Potassium 3.4 mmol/L      Chloride 104 mmol/L      CO2 21.0 mmol/L      Calcium 8.0 mg/dL      Total Protein 6.4 g/dL      Albumin 3.3 g/dL      ALT (SGPT) 7 U/L      AST (SGOT) 9 U/L      Alkaline Phosphatase 49 U/L      Total Bilirubin 0.7 mg/dL      Globulin 3.1 gm/dL      A/G Ratio 1.1 g/dL      BUN/Creatinine Ratio 9.4     Anion Gap 12.0 mmol/L      eGFR 110.5 mL/min/1.73     Narrative:      GFR Normal >60  Chronic Kidney Disease <60  Kidney Failure <15      Magnesium [[id number]]  (Normal) Collected: 10/24/24 2039    Specimen: Blood Updated: 10/24/24 2119     Magnesium 1.8 mg/dL     Lactic Acid, Plasma [[id number]]  (Normal) Collected: 10/24/24 2045    Specimen: Blood Updated: 10/24/24 2117     Lactate 0.7 mmol/L     Phosphorus [[id number]]  (Normal) Collected: 10/24/24 2039    Specimen: Blood Updated: 10/24/24 2116     Phosphorus 3.2 mg/dL     CBC & Differential [[id number]]  (Abnormal) Collected: 10/24/24 2039    Specimen: Blood Updated: 10/24/24 2107    Narrative:      The following orders were created for panel order CBC & Differential.  Procedure                               Abnormality         Status                     ---------                               -----------         ------                     CBC  Auto Differential[[id number]]        Abnormal            Final result                 Please view results for these tests on the individual orders.    CBC Auto Differential [[id number]]  (Abnormal) Collected: 10/24/24 2039    Specimen: Blood Updated: 10/24/24 2107     WBC 18.66 10*3/mm3      RBC 4.10 10*6/mm3      Hemoglobin 9.5 g/dL      Hematocrit 30.6 %      MCV 74.6 fL      MCH 23.2 pg      MCHC 31.0 g/dL      RDW 17.6 %      RDW-SD 47.7 fl      MPV 11.4 fL      Platelets 362 10*3/mm3      Neutrophil % 82.9 %      Lymphocyte % 8.7 %      Monocyte % 7.0 %      Eosinophil % 0.2 %      Basophil % 0.2 %      Immature Grans % 1.0 %      Neutrophils, Absolute 15.48 10*3/mm3      Lymphocytes, Absolute 1.62 10*3/mm3      Monocytes, Absolute 1.30 10*3/mm3      Eosinophils, Absolute 0.03 10*3/mm3      Basophils, Absolute 0.04 10*3/mm3      Immature Grans, Absolute 0.19 10*3/mm3      nRBC 0.0 /100 WBC     Blood Culture - Blood, Arm, Left [[id number]] Collected: 10/24/24 2045    Specimen: Blood from Arm, Left Updated: 10/24/24 2102    Blood Culture - Blood, Arm, Right [[id number]] Collected: 10/24/24 2039    Specimen: Blood from Arm, Right Updated: 10/24/24 2102          Imaging Results (Last 24 Hours)       Procedure Component Value Units Date/Time    XR Outside Films [[id number]] Resulted: 10/24/24 2121     Updated: 10/24/24 2121    Narrative:      This procedure was auto-finalized with no dictation required.    CT Outside Films [[id number]] Resulted: 10/24/24 2121     Updated: 10/24/24 2121    Narrative:      This procedure was auto-finalized with no dictation required.              Assessment & Plan       Postoperative intra-abdominal abscess      Plan: Exploratory laparoscopy possible laparotomy with robotic assist.  I explained to the patient that based off the CT findings and report there is a chance that her colon is involved or injured.  If this is the case she would require resection of colon and a colostomy.   Alternatives is observation, benefits is exploration and evacuation of the abscess and potential mass effect as well as examining the intestines, identification of possible infection source, complications and risks include risk of intra-abdominal organ injury, bowel injury, bleeding and prolonged hospital stay.  Informed consent was obtained and patient was sent to the OR.      Goran Rdz MD  10/24/24  22:18 CDT

## 2024-10-25 NOTE — ANESTHESIA PREPROCEDURE EVALUATION
Anesthesia Evaluation     Patient summary reviewed   no history of anesthetic complications:   NPO Solid Status: > 8 hours  NPO Liquid Status: > 8 hours           Airway   Mallampati: II  TM distance: >3 FB  Neck ROM: full  No difficulty expected  Dental - normal exam     Pulmonary    (+) a smoker,  Cardiovascular - negative cardio ROS  Exercise tolerance: excellent (>7 METS)        Neuro/Psych- negative ROS  GI/Hepatic/Renal/Endo - negative ROS     Musculoskeletal (-) negative ROS    Abdominal    Substance History      OB/GYN          Other                        Anesthesia Plan    ASA 2 - emergent     general     intravenous induction     Anesthetic plan, risks, benefits, and alternatives have been provided, discussed and informed consent has been obtained with: patient.      CODE STATUS:    Code Status (Patient has no pulse and is not breathing): CPR (Attempt to Resuscitate)  Medical Interventions (Patient has pulse or is breathing): Full Support

## 2024-10-25 NOTE — ANESTHESIA POSTPROCEDURE EVALUATION
Patient: Aubree Alegria    Procedure Summary       Date: 10/24/24 Room / Location: [facility] OR  /  [facility] OR    Anesthesia Start: 2253 Anesthesia Stop: 10/25/24 0104    Procedures:       DIAGNOSTIC LAPAROSCOPY WITH DAVINCI ROBOT (Abdomen)      DIAGNOSTIC LAPAROSCOPY WITH DAVINCI ROBOT (Abdomen) Diagnosis:       Abscess, intra-abdominal, postoperative      (Intra-abdominal abscess)    Surgeons: Sami Villalobos MD; Goran Rdz MD Provider: Kevin Torres CRNA    Anesthesia Type: general ASA Status: 2 - Emergent            Anesthesia Type: general    Vitals  Vitals Value Taken Time   /64 10/25/24 0154   Temp 99.5 °F (37.5 °C) 10/25/24 0155   Pulse 92 10/25/24 0157   Resp 16 10/25/24 0155   SpO2 96 % 10/25/24 0156   Vitals shown include unfiled device data.        Post Anesthesia Care and Evaluation    Patient location during evaluation: PACU  Patient participation: complete - patient participated  Level of consciousness: awake and alert  Pain management: adequate    Airway patency: patent  Anesthetic complications: No anesthetic complications    Cardiovascular status: acceptable  Respiratory status: acceptable  Hydration status: acceptable    Comments: Blood pressure 98/51, pulse 84, temperature 98.3 °F (36.8 °C), temperature source Oral, resp. rate 16, last menstrual period 10/07/2024, SpO2 95%, not currently breastfeeding.    Pt discharged from PACU based on tomy score >8

## 2024-10-25 NOTE — PROGRESS NOTES
Patient Care Team:  Rashawn Mcgovern MD as PCP - General (Family Medicine)    Subjective     Aubree Alegria is POD 1 day ago from exploratory laparoscopy and sigmoidoscopy.  Patient states she feels much better today.  Has passed some flatus and denies nausea or vomiting.  Her nausea has improved since her aches appearance of nausea yesterday and the prior days.  She is ambulating and tolerating clears.       Review of Systems:     Review of Systems - General ROS: positive for  - fatigue  Respiratory ROS: no cough, shortness of breath, or wheezing  Cardiovascular ROS: no chest pain or dyspnea on exertion  Gastrointestinal ROS: positive for - abdominal pain  negative for - nausea/vomiting    Objective     Vital Signs  /59 (BP Location: Left arm, Patient Position: Sitting)   Pulse 95   Temp 98.8 °F (37.1 °C) (Temporal)   Resp 16   LMP 10/07/2024 (Approximate) Comment: Neg HCG  SpO2 98%     Physical Exam:  Physical Exam  Abdominal:      General: A surgical scar is present.      Palpations: Abdomen is soft.      Tenderness: There is abdominal tenderness. There is no guarding or rebound.          Comments: DANNA drain in place.  Serosanguineous fluid with mild exudative color and appearance.  Appropriate tenderness at incision sites.           Results Review:    Labs reviewed  Lab Results (last 24 hours)       Procedure Component Value Units Date/Time    CBC & Differential [[id number]]  (Abnormal) Collected: 10/25/24 0828    Specimen: Blood Updated: 10/25/24 0928    Narrative:      The following orders were created for panel order CBC & Differential.  Procedure                               Abnormality         Status                     ---------                               -----------         ------                     CBC Auto Differential[[id number]]        Abnormal            Final result                 Please view results for these tests on the individual orders.    CBC Auto Differential [[id number]]   (Abnormal) Collected: 10/25/24 0828    Specimen: Blood Updated: 10/25/24 0928     WBC 13.20 10*3/mm3      RBC 4.14 10*6/mm3      Hemoglobin 9.5 g/dL      Hematocrit 30.9 %      MCV 74.6 fL      MCH 22.9 pg      MCHC 30.7 g/dL      RDW 17.8 %      RDW-SD 47.8 fl      MPV 11.9 fL      Platelets 355 10*3/mm3     Narrative:      The previously reported component NRBC is no longer being reported. Previous result was 0.0 /100 WBC (Reference Range: 0.0-0.2 /100 WBC) on 10/25/2024 at 0906 CDT.    Manual Differential [[id number]]  (Abnormal) Collected: 10/25/24 0828    Specimen: Blood Updated: 10/25/24 0928     Neutrophil % 71.3 %      Lymphocyte % 2.0 %      Monocyte % 2.0 %      Eosinophil % 0.0 %      Basophil % 0.0 %      Bands %  22.8 %      Metamyelocyte % 2.0 %      Neutrophils Absolute 12.42 10*3/mm3      Lymphocytes Absolute 0.26 10*3/mm3      Monocytes Absolute 0.26 10*3/mm3      Eosinophils Absolute 0.00 10*3/mm3      Basophils Absolute 0.00 10*3/mm3      Anisocytosis Slight/1+     Crenated RBC's Slight/1+     Dacrocytes Slight/1+     Microcytes Slight/1+     Ovalocytes Slight/1+     Poikilocytes Slight/1+     Polychromasia Slight/1+     Toxic Granulation Slight/1+     Vacuolated Neutrophils Slight/1+     Clumped Platelets Present    Comprehensive Metabolic Panel [[id number]]  (Abnormal) Collected: 10/25/24 0828    Specimen: Blood Updated: 10/25/24 0927     Glucose 184 mg/dL      BUN 8 mg/dL      Creatinine 0.62 mg/dL      Sodium 138 mmol/L      Potassium 3.6 mmol/L      Chloride 105 mmol/L      CO2 20.0 mmol/L      Calcium 8.2 mg/dL      Total Protein 5.7 g/dL      Albumin 2.8 g/dL      ALT (SGPT) 5 U/L      AST (SGOT) 7 U/L      Alkaline Phosphatase 46 U/L      Total Bilirubin 0.7 mg/dL      Globulin 2.9 gm/dL      A/G Ratio 1.0 g/dL      BUN/Creatinine Ratio 12.9     Anion Gap 13.0 mmol/L      eGFR 111.4 mL/min/1.73     Narrative:      GFR Normal >60  Chronic Kidney Disease <60  Kidney Failure <15      Body Fluid  "Culture - Body Fluid, Pelvis [[id number]] Collected: 10/24/24 2327    Specimen: Body Fluid from Pelvis Updated: 10/25/24 0549     Body Fluid Culture Abnormal Stain     Gram Stain Many (4+) WBCs per low power field      Few (2+) Gram positive cocci      Few (2+) Gram negative bacilli    Anaerobic Culture - Body Fluid, Pelvis [[id number]] Collected: 10/24/24 2327    Specimen: Body Fluid from Pelvis Updated: 10/25/24 0106    Procalcitonin [[id number]]  (Normal) Collected: 10/24/24 2039    Specimen: Blood Updated: 10/24/24 2125     Procalcitonin 0.17 ng/mL     Narrative:      As a Marker for Sepsis (Non-Neonates):    1. <0.5 ng/mL represents a low risk of severe sepsis and/or septic shock.  2. >2 ng/mL represents a high risk of severe sepsis and/or septic shock.    As a Marker for Lower Respiratory Tract Infections that require antibiotic therapy:    PCT on Admission    Antibiotic Therapy       6-12 Hrs later    >0.5                Strongly Recommended  >0.25 - <0.5        Recommended   0.1 - 0.25          Discouraged              Remeasure/reassess PCT  <0.1                Strongly Discouraged     Remeasure/reassess PCT    As 28 day mortality risk marker: \"Change in Procalcitonin Result\" (>80% or <=80%) if Day 0 (or Day 1) and Day 4 values are available. Refer to http://www.[employer]-pct-calculator.com    Change in PCT <=80%  A decrease of PCT levels below or equal to 80% defines a positive change in PCT test result representing a higher risk for 28-day all-cause mortality of patients diagnosed with severe sepsis for septic shock.    Change in PCT >80%  A decrease of PCT levels of more than 80% defines a negative change in PCT result representing a lower risk for 28-day all-cause mortality of patients diagnosed with severe sepsis or septic shock.       Comprehensive Metabolic Panel [[id number]]  (Abnormal) Collected: 10/24/24 2039    Specimen: Blood Updated: 10/24/24 2119     Glucose 114 mg/dL      BUN 6 mg/dL      Creatinine " 0.64 mg/dL      Sodium 137 mmol/L      Potassium 3.4 mmol/L      Chloride 104 mmol/L      CO2 21.0 mmol/L      Calcium 8.0 mg/dL      Total Protein 6.4 g/dL      Albumin 3.3 g/dL      ALT (SGPT) 7 U/L      AST (SGOT) 9 U/L      Alkaline Phosphatase 49 U/L      Total Bilirubin 0.7 mg/dL      Globulin 3.1 gm/dL      A/G Ratio 1.1 g/dL      BUN/Creatinine Ratio 9.4     Anion Gap 12.0 mmol/L      eGFR 110.5 mL/min/1.73     Narrative:      GFR Normal >60  Chronic Kidney Disease <60  Kidney Failure <15      Magnesium [[id number]]  (Normal) Collected: 10/24/24 2039    Specimen: Blood Updated: 10/24/24 2119     Magnesium 1.8 mg/dL     Lactic Acid, Plasma [[id number]]  (Normal) Collected: 10/24/24 2045    Specimen: Blood Updated: 10/24/24 2117     Lactate 0.7 mmol/L     Phosphorus [[id number]]  (Normal) Collected: 10/24/24 2039    Specimen: Blood Updated: 10/24/24 2116     Phosphorus 3.2 mg/dL     CBC & Differential [[id number]]  (Abnormal) Collected: 10/24/24 2039    Specimen: Blood Updated: 10/24/24 2107    Narrative:      The following orders were created for panel order CBC & Differential.  Procedure                               Abnormality         Status                     ---------                               -----------         ------                     CBC Auto Differential[[id number]]        Abnormal            Final result                 Please view results for these tests on the individual orders.    CBC Auto Differential [[id number]]  (Abnormal) Collected: 10/24/24 2039    Specimen: Blood Updated: 10/24/24 2107     WBC 18.66 10*3/mm3      RBC 4.10 10*6/mm3      Hemoglobin 9.5 g/dL      Hematocrit 30.6 %      MCV 74.6 fL      MCH 23.2 pg      MCHC 31.0 g/dL      RDW 17.6 %      RDW-SD 47.7 fl      MPV 11.4 fL      Platelets 362 10*3/mm3      Neutrophil % 82.9 %      Lymphocyte % 8.7 %      Monocyte % 7.0 %      Eosinophil % 0.2 %      Basophil % 0.2 %      Immature Grans % 1.0 %      Neutrophils, Absolute 15.48  10*3/mm3      Lymphocytes, Absolute 1.62 10*3/mm3      Monocytes, Absolute 1.30 10*3/mm3      Eosinophils, Absolute 0.03 10*3/mm3      Basophils, Absolute 0.04 10*3/mm3      Immature Grans, Absolute 0.19 10*3/mm3      nRBC 0.0 /100 WBC     Blood Culture - Blood, Arm, Left [[id number]] Collected: 10/24/24 2045    Specimen: Blood from Arm, Left Updated: 10/24/24 2102    Blood Culture - Blood, Arm, Right [[id number]] Collected: 10/24/24 2039    Specimen: Blood from Arm, Right Updated: 10/24/24 2102              Medication Reviewed:   Current Facility-Administered Medications   Medication Dose Route Frequency Provider Last Rate Last Admin    acetaminophen (TYLENOL) tablet 325 mg  325 mg Oral Q8H Goran Rdz MD   325 mg at 10/25/24 0921    dexAMETHasone (DECADRON) injection 4 mg  4 mg Intravenous Q8H PRN Goran Rdz MD        diphenhydrAMINE (BENADRYL) injection 25 mg  25 mg Intravenous Q6H PRN Goran Rdz MD        HYDROmorphone (DILAUDID) injection 0.5 mg  0.5 mg Intravenous Q2H PRN Goran Rdz MD        And    naloxone (NARCAN) injection 0.1 mg  0.1 mg Intravenous Q5 Min PRN Goran Rdz MD        labetalol (NORMODYNE,TRANDATE) injection 10 mg  10 mg Intravenous Q30 Min PRN Goran Rdz MD        lactated ringers infusion  125 mL/hr Intravenous Continuous Goran Rdz  mL/hr at 10/25/24 0210 125 mL/hr at 10/25/24 0210    ondansetron (ZOFRAN) 8 mg/50 mL NS IVPB  8 mg Intravenous Q6H PRN Goran Rdz MD        ondansetron (ZOFRAN) injection 4 mg  4 mg Intravenous Q6H PRN Goran Rdz MD        piperacillin-tazobactam (ZOSYN) 3.375 g IVPB in 100 mL NS MBP (CD)  3.375 g Intravenous Q8H Goran Rdz MD   3.375 g at 10/25/24 0921    scopolamine patch 1 mg/72 hr  1 patch Transdermal Q72H Goran Rdz MD        simethicone (MYLICON) chewable tablet 40 mg  40 mg Oral 4x Daily PRN Goran Rdz MD        sodium chloride 0.9 % flush 1-10 mL  1-10 mL Intravenous  PRN Goran Rdz MD        sodium chloride 0.9 % flush 10 mL  10 mL Intravenous Q12H Goran Rdz MD   10 mL at 10/25/24 0244       Assessment & Plan  POD 1 day ago from the above procedure.  Patient remained stable.  Leukocytosis trending down and patient is tolerating p.o.  Awaiting for GI system to progress.  Will start oral pain medication.  Encouraged to continue to ambulate and utilize her incentive spirometer.  Advance diet.    Goran Rdz MD  10/25/24  10:52 CDT

## 2024-10-25 NOTE — H&P
[facility]  OB/GYN HISTORY AND PHYSICAL    Patient Name:Aubree Alegria  : 1980  MRN: [id number]  Date of admission: 10/24/2024  [id number]    Subjective   Subjective     Chief Complaint: No chief complaint on file.      History of Present Illness   Aubree Alegria is a 44 y.o. female  who is admitted as a transfer for post-operative abscess. She is post-operative day 3 from a robot-assisted TLH/BSO on 10/21 for menorrhagia/leiomyomas. Post-operative day 0-1 she reports doing fairly well. Going into POD#2, febrile, nauseated and RLQ pain. Fever as high as 101.6F at home. Called and was told if >102F, report to the ED. She called today for fever, cough, chills, and malaise. Advised to go to PCP/Urgent care. She reports went from PCP to ED at [facility]. While there, evaluation noted leukocytosis, febrile, RLQ pain, pelvic abscess. Transferred subsequently here. At bedside, reports feeling better after dilaudid. Reports RLQ discomfort which has been worsening. Nausea has been worsening. No vomiting. Has decreased appetite secondary to nausea. Voiding without difficulty. Passing flatus. No bowel movement since surgery. Spotting from the vagina only. No abnormal discharge. Febrile, chills, diaphoresis. At [facility], she has received PO potassium, ibuprofen, dilaudid, zofran, and started on zosyn.     Review of Systems   Constitutional:  Positive for chills, diaphoresis and fever.   Gastrointestinal:  Positive for abdominal pain and nausea.   Genitourinary:  Positive for pelvic pain and vaginal pain. Negative for decreased urine volume, difficulty urinating, dyspareunia, dysuria, enuresis, flank pain, frequency, genital sores, hematuria, menstrual problem, urgency, vaginal bleeding and vaginal discharge.   All other systems reviewed and are negative.      Personal History     Past Medical History:   Diagnosis Date    Spinal headache        Past Surgical History:   Procedure  Laterality Date    APPENDECTOMY      BREAST BIOPSY Bilateral     benign     SECTION      x2    ECTOPIC PREGNANCY      TOTAL LAPAROSCOPIC HYSTERECTOMY SALPINGO OOPHORECTOMY Bilateral 10/21/2024    Procedure: TOTAL LAPAROSCOPIC HYSTERECTOMY BILATERAL SALPINGOOPHORECTOMY WITH DAVINCI ROBOT;  Surgeon: Kathy Marie MD;  Location: [facility];  Service: Robotics - DaVinci;  Laterality: Bilateral;    TUBAL ABDOMINAL LIGATION      WISDOM TOOTH EXTRACTION         Family History:  Family History   Problem Relation Age of Onset    Kidney cancer Maternal Aunt     Hypertension Father     No Known Problems Mother     No Known Problems Sister     Leukemia Paternal Grandmother     Breast cancer Paternal Aunt 40    Ovarian cancer Neg Hx     Uterine cancer Neg Hx     Colon cancer Neg Hx     Melanoma Neg Hx     Prostate cancer Neg Hx        Social History:  Social History     Socioeconomic History    Marital status:    Tobacco Use    Smoking status: Some Days     Types: Cigarettes    Smokeless tobacco: Never   Vaping Use    Vaping status: Never Used   Substance and Sexual Activity    Alcohol use: Never    Drug use: Never    Sexual activity: Yes     Partners: Male     Birth control/protection: Surgical       Home Medications:  oxyCODONE-acetaminophen    Allergies:  Allergies   Allergen Reactions    Ketorolac Tromethamine Hives    Tramadol Hives       Objective    Objective     Vitals:    Temp:  [98.3 °F (36.8 °C)] 98.3 °F (36.8 °C)  Heart Rate:  [76] 76  Resp:  [18] 18  BP: (119)/(76) 119/76    Physical Exam  Vitals and nursing note reviewed.   Constitutional:       Appearance: Normal appearance.   HENT:      Head: Normocephalic and atraumatic.   Eyes:      General: No scleral icterus.     Conjunctiva/sclera: Conjunctivae normal.   Cardiovascular:      Rate and Rhythm: Normal rate.      Heart sounds: Normal heart sounds. No murmur heard.  Pulmonary:      Effort: Pulmonary effort is normal. No respiratory distress.       Breath sounds: Normal breath sounds. No wheezing.   Abdominal:      General: Bowel sounds are normal. There is distension.      Palpations: Abdomen is soft.      Tenderness: There is abdominal tenderness in the right lower quadrant. There is no guarding or rebound.      Comments: Incisions: clean and dry, appears to be healing well, no erythema/bleeding/discharge from incisions   Neurological:      Mental Status: She is alert.          Result Review    Result Review:  I have personally reviewed the results from the time of this admission to 10/24/2024 22:06 CDT and agree with these findings:  [x]  Laboratory list / accordion  [x]  Microbiology  [x]  Radiology  []  EKG/Telemetry   []  Cardiology/Vascular   []  Pathology  []  Old records  []  Other:  Most notable findings include:   Outside Labs [facility]  WBC 18.8, Hgb 10.4, Hct 32.4,   K+ 3.2  Cr 0.7  UA trace leuks, moderate blood, neg nitrite  Respiratory panel negative    CBC          11/2/2023    15:00 10/7/2024    16:04 10/24/2024    20:39   CBC   WBC 4.98  5.68  18.66    RBC 4.94  4.40  4.10    Hemoglobin 12.5  10.1  9.5    Hematocrit 40.0  33.7  30.6    MCV 81.0  76.6  74.6    MCH 25.3  23.0  23.2    MCHC 31.3  30.0  31.0    RDW 15.7  17.0  17.6    Platelets 331  359  362      CMP          11/2/2023    15:00 10/24/2024    20:39   CMP   Glucose 90  114    BUN 10  6    Creatinine 0.75  0.64    EGFR  110.5    Sodium 141  137    Potassium 4.6  3.4    Chloride 106  104    Calcium 9.4  8.0    Total Protein 7.0     Total Protein  6.4    Albumin 4.6  3.3    Globulin 2.4     Globulin  3.1    Total Bilirubin 0.4  0.7    Alkaline Phosphatase 44  49    AST (SGOT) 16  9    ALT (SGPT) 9  7    Albumin/Globulin Ratio  1.1    BUN/Creatinine Ratio 13.3  9.4    Anion Gap  12.0        CT Outside Films (10/24/2024 21:21)   Fluid collection and fecal-like material in the low pelvis measuring 12 x 7.5 x 6.6 cm. Suspicious for abscess. Irregularity and stricture of the sigmoid  colon adjacent to the collection which could be from inflammation or perforation. Cecum and distal ileum lie adjacent to the collection. Thickened walls of the distal ileum. Urinary bladder walls appear thickened with surrounding inflammation and air-fluid level in the bladder. Cannot exclude fistulous connection. Air tracking in the right side of the abdomen and pelvis in the musculature and outside musculature.     XR Outside Films (10/24/2024 21:21) \  CXR negative, right lung moderately low volume. Left lung well expanded, both clear    Assessment & Plan   Assessment / Plan     44 year-old POD# from robot TLH/BSO admitted with pelvic abscess. Evaluation shows a 12 x 7.5 x 6.6 cm fluid collection with fecal-like material in the pelvis along with stricture and irregularity of the sigmoid colon. Labs with leukocytosis. Febrile at home and at [facility]. Afebrile currently and on Zosyn. Exam of patient shows RLQ tenderness, not acute abdomen but this is in the setting of dilaudid. She is passing flatus and voiding without difficulty. Discussion with General Surgery, Dr. Rdz. Dr. Rdz coming in to evaluate patient. With CT read, he expressed concerns about the read and that surgical evaluation is warranted. Discussed with patient the above. Surgery reviewed with patient. Discussed plan for initial diagnostic laparoscopy with intent to stay minimally invasive but discussed laparotomy is a possibility. If colon is involved discussed that colostomy is a possibility. Risks of surgery reviewed. Questions answered. She expressed understanding and is in agreement.     Intra-abdominal pelvic abscess  -admit to GYN  -NPO  -continue zosyn  -blood cultures pending  -daily labs  -to Main OR for surgical evaluation w/General Surgery    Surgical Counseling  The patient was informed of the risks and benefits of the planned procedures. The risks included but were not limited to: bleeding, infection, injury to surrounding structures  potentially including the vascular, gastrointestinal, and genitourinary systems, nerve injury, possible blood transfusion and its associated risks. Patient was counseled on the possibility of a laparotomy, and all other indicated procedures. We discussed the possibility of difficulties with anesthesia, potential exacerbations to other health conditions, and potential concern for chronic pain that follows any surgery. Discussed recovery expectations as well as limitations following surgery. Patient expressed understanding of the risks involved with surgery as well as the surgery itself. Her questions were answered to her satisfaction. No guarantees were made or implied. The patient consented to proceed with the planned procedures.        Sami Villalobos MD  10/24/2024  22:06 CDT

## 2024-10-25 NOTE — PLAN OF CARE
Goal Outcome Evaluation:  Plan of Care Reviewed With: patient        Progress: improving  Outcome Evaluation: VSS, patient ambulating and voiding, pain controlled with pain medicaton, IV abx given today.

## 2024-10-26 LAB
ALBUMIN SERPL-MCNC: 2.7 G/DL (ref 3.5–5.2)
ALBUMIN/GLOB SERPL: 1.1 G/DL
ALP SERPL-CCNC: 38 U/L (ref 39–117)
ALT SERPL W P-5'-P-CCNC: 6 U/L (ref 1–33)
ANION GAP SERPL CALCULATED.3IONS-SCNC: 7 MMOL/L (ref 5–15)
AST SERPL-CCNC: 7 U/L (ref 1–32)
BILIRUB SERPL-MCNC: 0.3 MG/DL (ref 0–1.2)
BUN SERPL-MCNC: 7 MG/DL (ref 6–20)
BUN/CREAT SERPL: 10.6 (ref 7–25)
CALCIUM SPEC-SCNC: 7.8 MG/DL (ref 8.6–10.5)
CHLORIDE SERPL-SCNC: 106 MMOL/L (ref 98–107)
CO2 SERPL-SCNC: 26 MMOL/L (ref 22–29)
CREAT SERPL-MCNC: 0.66 MG/DL (ref 0.57–1)
DEPRECATED RDW RBC AUTO: 49.1 FL (ref 37–54)
EGFRCR SERPLBLD CKD-EPI 2021: 109.7 ML/MIN/1.73
ERYTHROCYTE [DISTWIDTH] IN BLOOD BY AUTOMATED COUNT: 17.7 % (ref 12.3–15.4)
GLOBULIN UR ELPH-MCNC: 2.5 GM/DL
GLUCOSE SERPL-MCNC: 132 MG/DL (ref 65–99)
HCT VFR BLD AUTO: 24 % (ref 34–46.6)
HGB BLD-MCNC: 7.3 G/DL (ref 12–15.9)
MCH RBC QN AUTO: 22.8 PG (ref 26.6–33)
MCHC RBC AUTO-ENTMCNC: 30.4 G/DL (ref 31.5–35.7)
MCV RBC AUTO: 75 FL (ref 79–97)
PLATELET # BLD AUTO: 300 10*3/MM3 (ref 140–450)
PMV BLD AUTO: 10.6 FL (ref 6–12)
POTASSIUM SERPL-SCNC: 3.4 MMOL/L (ref 3.5–5.2)
PROT SERPL-MCNC: 5.2 G/DL (ref 6–8.5)
RBC # BLD AUTO: 3.2 10*6/MM3 (ref 3.77–5.28)
SODIUM SERPL-SCNC: 139 MMOL/L (ref 136–145)
WBC NRBC COR # BLD AUTO: 9.12 10*3/MM3 (ref 3.4–10.8)

## 2024-10-26 PROCEDURE — 80053 COMPREHEN METABOLIC PANEL: CPT | Performed by: SURGERY

## 2024-10-26 PROCEDURE — 85027 COMPLETE CBC AUTOMATED: CPT | Performed by: SURGERY

## 2024-10-26 PROCEDURE — 25010000002 PIPERACILLIN SOD-TAZOBACTAM PER 1 G: Performed by: SURGERY

## 2024-10-26 PROCEDURE — 25810000003 LACTATED RINGERS PER 1000 ML: Performed by: SURGERY

## 2024-10-26 RX ADMIN — PIPERACILLIN SODIUM AND TAZOBACTAM SODIUM 3.38 G: 3; .375 INJECTION, POWDER, LYOPHILIZED, FOR SOLUTION INTRAVENOUS at 18:22

## 2024-10-26 RX ADMIN — OXYCODONE HYDROCHLORIDE 5 MG: 5 TABLET ORAL at 15:18

## 2024-10-26 RX ADMIN — PIPERACILLIN SODIUM AND TAZOBACTAM SODIUM 3.38 G: 3; .375 INJECTION, POWDER, LYOPHILIZED, FOR SOLUTION INTRAVENOUS at 10:20

## 2024-10-26 RX ADMIN — SODIUM CHLORIDE, POTASSIUM CHLORIDE, SODIUM LACTATE AND CALCIUM CHLORIDE 75 ML/HR: 600; 310; 30; 20 INJECTION, SOLUTION INTRAVENOUS at 05:10

## 2024-10-26 RX ADMIN — ACETAMINOPHEN 325 MG: 325 TABLET ORAL at 10:20

## 2024-10-26 RX ADMIN — ACETAMINOPHEN 325 MG: 325 TABLET ORAL at 18:22

## 2024-10-26 RX ADMIN — ACETAMINOPHEN 325 MG: 325 TABLET ORAL at 02:26

## 2024-10-26 RX ADMIN — PIPERACILLIN SODIUM AND TAZOBACTAM SODIUM 3.38 G: 3; .375 INJECTION, POWDER, LYOPHILIZED, FOR SOLUTION INTRAVENOUS at 02:30

## 2024-10-26 RX ADMIN — OXYCODONE HYDROCHLORIDE 5 MG: 5 TABLET ORAL at 05:11

## 2024-10-26 NOTE — PROGRESS NOTES
Patient Care Team:  Rashawn Mcgovern MD as PCP - General (Family Medicine)    Subjective     Aubree Alegria is POD 2 days ago from exploratory laparoscopy and evacuation of intra-abdominal abscess.  She states today she is tolerating clears and decreased nausea and vomiting.  Her pain has slightly improved.  She continues to have flatus however has not had a bowel movement.  She is tolerating clears without difficulty.       Review of Systems:     Review of Systems - General ROS: negative  Respiratory ROS: no cough, shortness of breath, or wheezing  Cardiovascular ROS: no chest pain or dyspnea on exertion  Gastrointestinal ROS: positive for - abdominal pain and gas/bloating  negative for - blood in stools, diarrhea, heartburn, or nausea/vomiting    Objective     Vital Signs  /75 (BP Location: Left arm, Patient Position: Lying)   Pulse 66   Temp 98.7 °F (37.1 °C) (Oral)   Resp 18   LMP 10/07/2024 (Approximate) Comment: Neg HCG  SpO2 100%     Physical Exam:  Physical Exam  Constitutional:       General: She is not in acute distress.     Appearance: Normal appearance. She is normal weight.   Abdominal:      General: Bowel sounds are normal. There is no distension.      Tenderness: There is abdominal tenderness. There is no guarding or rebound.      Comments: Drink fluid less exudative in nature.  Continues to be serosanguineous.  Output is decreasing.  Tenderness in the appropriate locations of the incision site.   Neurological:      Mental Status: She is alert.           Results Review:    Labs reviewed  Lab Results (last 24 hours)       Procedure Component Value Units Date/Time    Comprehensive Metabolic Panel [[id number]]  (Abnormal) Collected: 10/26/24 0615    Specimen: Blood Updated: 10/26/24 0648     Glucose 132 mg/dL      BUN 7 mg/dL      Creatinine 0.66 mg/dL      Sodium 139 mmol/L      Potassium 3.4 mmol/L      Chloride 106 mmol/L      CO2 26.0 mmol/L      Calcium 7.8 mg/dL      Total Protein 5.2  g/dL      Albumin 2.7 g/dL      ALT (SGPT) 6 U/L      AST (SGOT) 7 U/L      Alkaline Phosphatase 38 U/L      Total Bilirubin 0.3 mg/dL      Globulin 2.5 gm/dL      A/G Ratio 1.1 g/dL      BUN/Creatinine Ratio 10.6     Anion Gap 7.0 mmol/L      eGFR 109.7 mL/min/1.73     Narrative:      GFR Normal >60  Chronic Kidney Disease <60  Kidney Failure <15      CBC (No Diff) [[id number]]  (Abnormal) Collected: 10/26/24 0615    Specimen: Blood Updated: 10/26/24 0638     WBC 9.12 10*3/mm3      RBC 3.20 10*6/mm3      Hemoglobin 7.3 g/dL      Hematocrit 24.0 %      MCV 75.0 fL      MCH 22.8 pg      MCHC 30.4 g/dL      RDW 17.7 %      RDW-SD 49.1 fl      MPV 10.6 fL      Platelets 300 10*3/mm3     Blood Culture - Blood, Arm, Left [[id number]]  (Normal) Collected: 10/24/24 2045    Specimen: Blood from Arm, Left Updated: 10/25/24 2115     Blood Culture No growth at 24 hours    Blood Culture - Blood, Arm, Right [[id number]]  (Normal) Collected: 10/24/24 2039    Specimen: Blood from Arm, Right Updated: 10/25/24 2115     Blood Culture No growth at 24 hours              Medication Reviewed:   Current Facility-Administered Medications   Medication Dose Route Frequency Provider Last Rate Last Admin    acetaminophen (TYLENOL) tablet 325 mg  325 mg Oral Q8H Goran Rdz MD   325 mg at 10/26/24 0226    dexAMETHasone (DECADRON) injection 4 mg  4 mg Intravenous Q8H PRN Goran Rdz MD        diphenhydrAMINE (BENADRYL) injection 25 mg  25 mg Intravenous Q6H PRN Goran Rdz MD        HYDROmorphone (DILAUDID) injection 0.5 mg  0.5 mg Intravenous Q2H PRN Goran Rdz MD   0.5 mg at 10/25/24 1827    And    naloxone (NARCAN) injection 0.1 mg  0.1 mg Intravenous Q5 Min PRN Goran Rdz MD        labetalol (NORMODYNE,TRANDATE) injection 10 mg  10 mg Intravenous Q30 Min PRN Goran Rdz MD        lactated ringers infusion  75 mL/hr Intravenous Continuous Goran Rdz MD 75 mL/hr at 10/26/24 0510 75 mL/hr at 10/26/24  0510    ondansetron (ZOFRAN) 8 mg/50 mL NS IVPB  8 mg Intravenous Q6H PRN Goran Rdz MD        oxyCODONE (ROXICODONE) immediate release tablet 5 mg  5 mg Oral Q4H PRN Goran Rdz MD   5 mg at 10/26/24 0511    piperacillin-tazobactam (ZOSYN) 3.375 g IVPB in 100 mL NS MBP (CD)  3.375 g Intravenous Q8H Goran Rdz MD   3.375 g at 10/26/24 0230    scopolamine patch 1 mg/72 hr  1 patch Transdermal Q72H Goran Rdz MD        simethicone (MYLICON) chewable tablet 40 mg  40 mg Oral 4x Daily PRN Goran Rdz MD        sodium chloride 0.9 % flush 1-10 mL  1-10 mL Intravenous PRN Goran Rdz MD        sodium chloride 0.9 % flush 10 mL  10 mL Intravenous Q12H Goran Rdz MD   10 mL at 10/25/24 0244       Assessment & Plan  POD 2 days ago from the above procedure.  Awaiting GI system the continue to progress with bowel movement.  Notices slight drop in H&H as well as leukocytosis.  Will continue to monitor.  Will advance to full liquids focusing on protein.  Possible discharge home soon.  Explained treatment plan with patient and family.      Goran Rdz MD  10/26/24  09:51 CDT

## 2024-10-26 NOTE — PROGRESS NOTES
Aubree Alergia  : 1980  MRN: [id number]  [facility]: [id number]    Post-operative Day #5/2  Subjective   Pain is controlled.  She is ambulatory.  She is eating breakfast, gomez and eggs, this morning. She is passing flatus but has not had a bowel movement.     Objective     Min/max vitals past 24 hours:   Temp  Min: 98.2 °F (36.8 °C)  Max: 98.7 °F (37.1 °C)  BP  Min: 110/55  Max: 122/74  Pulse  Min: 66  Max: 90  Pulse  Min: 66  Max: 90        I/O last 3 completed shifts:  In: 750 [I.V.:750]  Out:  [Urine:1660; Drains:371]    General: well developed; well nourished  no acute distress   Abdomen: Soft, nondistended, appropriately tender, DANNA drain serous   Pelvic: Not performed   Ext: Calves NT     Lab Results   Component Value Date    WBC 9.12 10/26/2024    HGB 7.3 (L) 10/26/2024    HCT 24.0 (L) 10/26/2024    MCV 75.0 (L) 10/26/2024     10/26/2024     Blood cultures no growth to date  Abdominal cavity culture with positive Gram stain and culture pending       Assessment   Post-op Day #5/2 S/P robotic hysterectomy and subsequent washout of intra-abdominal abscess     Plan   Awaiting full GI function return with bowel movement.  Otherwise, she is doing well with normalized WBC, no fevers, and is feeling much better.  Awaiting final culture results.  Continue IV Zosyn.    Colin Reyes MD  10/26/2024  10:56 CDT

## 2024-10-26 NOTE — PLAN OF CARE
Goal Outcome Evaluation:  Plan of Care Reviewed With: patient           Outcome Evaluation: VSS. PT ambulating and voiding without difficulty. Ismael site dressing saturated and changed. Site intact w/ no redness or signs of infection. No new drainage noted on dressing. Pain controlled with prn po pain medication per pt. IV abx given this shift. IVF infusing. Pt is dts. Given apple juice and water. Educated pt on fluid intake, ambulating, and chewing gum to promote motility.

## 2024-10-27 VITALS
SYSTOLIC BLOOD PRESSURE: 115 MMHG | DIASTOLIC BLOOD PRESSURE: 71 MMHG | RESPIRATION RATE: 20 BRPM | HEART RATE: 78 BPM | TEMPERATURE: 98.1 F | OXYGEN SATURATION: 98 %

## 2024-10-27 LAB
ALBUMIN SERPL-MCNC: 2.5 G/DL (ref 3.5–5.2)
ALBUMIN/GLOB SERPL: 0.9 G/DL
ALP SERPL-CCNC: 45 U/L (ref 39–117)
ALT SERPL W P-5'-P-CCNC: 14 U/L (ref 1–33)
ANION GAP SERPL CALCULATED.3IONS-SCNC: 14 MMOL/L (ref 5–15)
AST SERPL-CCNC: 15 U/L (ref 1–32)
BACTERIA FLD CULT: ABNORMAL
BILIRUB SERPL-MCNC: 0.2 MG/DL (ref 0–1.2)
BUN SERPL-MCNC: 6 MG/DL (ref 6–20)
BUN/CREAT SERPL: 8.6 (ref 7–25)
CALCIUM SPEC-SCNC: 7.7 MG/DL (ref 8.6–10.5)
CHLORIDE SERPL-SCNC: 103 MMOL/L (ref 98–107)
CO2 SERPL-SCNC: 21 MMOL/L (ref 22–29)
CREAT SERPL-MCNC: 0.7 MG/DL (ref 0.57–1)
DEPRECATED RDW RBC AUTO: 48.2 FL (ref 37–54)
EGFRCR SERPLBLD CKD-EPI 2021: 108.2 ML/MIN/1.73
ERYTHROCYTE [DISTWIDTH] IN BLOOD BY AUTOMATED COUNT: 17.8 % (ref 12.3–15.4)
GLOBULIN UR ELPH-MCNC: 2.8 GM/DL
GLUCOSE SERPL-MCNC: 118 MG/DL (ref 65–99)
GRAM STN SPEC: ABNORMAL
HCT VFR BLD AUTO: 25.2 % (ref 34–46.6)
HGB BLD-MCNC: 7.7 G/DL (ref 12–15.9)
MCH RBC QN AUTO: 22.8 PG (ref 26.6–33)
MCHC RBC AUTO-ENTMCNC: 30.6 G/DL (ref 31.5–35.7)
MCV RBC AUTO: 74.8 FL (ref 79–97)
PLATELET # BLD AUTO: 378 10*3/MM3 (ref 140–450)
PMV BLD AUTO: 10.7 FL (ref 6–12)
POTASSIUM SERPL-SCNC: 3.2 MMOL/L (ref 3.5–5.2)
PROT SERPL-MCNC: 5.3 G/DL (ref 6–8.5)
RBC # BLD AUTO: 3.37 10*6/MM3 (ref 3.77–5.28)
SODIUM SERPL-SCNC: 138 MMOL/L (ref 136–145)
WBC NRBC COR # BLD AUTO: 7.43 10*3/MM3 (ref 3.4–10.8)

## 2024-10-27 PROCEDURE — 85027 COMPLETE CBC AUTOMATED: CPT | Performed by: SURGERY

## 2024-10-27 PROCEDURE — 25010000002 PIPERACILLIN SOD-TAZOBACTAM PER 1 G: Performed by: SURGERY

## 2024-10-27 PROCEDURE — 25810000003 SODIUM CHLORIDE 0.9 % SOLUTION 250 ML FLEX CONT: Performed by: SURGERY

## 2024-10-27 PROCEDURE — 80053 COMPREHEN METABOLIC PANEL: CPT | Performed by: SURGERY

## 2024-10-27 RX ORDER — ACETAMINOPHEN 325 MG/1
325 TABLET ORAL EVERY 8 HOURS
Status: DISCONTINUED | OUTPATIENT
Start: 2024-10-27 | End: 2024-10-27 | Stop reason: SDUPTHER

## 2024-10-27 RX ORDER — POTASSIUM CHLORIDE 1500 MG/1
20 TABLET, EXTENDED RELEASE ORAL 2 TIMES DAILY
Qty: 6 TABLET | Refills: 0 | Status: SHIPPED | OUTPATIENT
Start: 2024-10-27 | End: 2024-11-07 | Stop reason: HOSPADM

## 2024-10-27 RX ORDER — ACETAMINOPHEN 500 MG
500 TABLET ORAL EVERY 6 HOURS PRN
Qty: 60 TABLET | Refills: 0 | Status: SHIPPED | OUTPATIENT
Start: 2024-10-27 | End: 2024-11-16

## 2024-10-27 RX ORDER — OXYCODONE HYDROCHLORIDE 5 MG/1
5 TABLET ORAL EVERY 8 HOURS PRN
Qty: 20 TABLET | Refills: 0 | Status: SHIPPED | OUTPATIENT
Start: 2024-10-27 | End: 2024-11-07 | Stop reason: HOSPADM

## 2024-10-27 RX ORDER — ONDANSETRON 4 MG/1
4 TABLET, ORALLY DISINTEGRATING ORAL ONCE AS NEEDED
Status: DISCONTINUED | OUTPATIENT
Start: 2024-10-27 | End: 2024-10-27 | Stop reason: HOSPADM

## 2024-10-27 RX ORDER — ONDANSETRON 4 MG/1
4 TABLET, ORALLY DISINTEGRATING ORAL EVERY 8 HOURS PRN
Qty: 30 TABLET | Refills: 1 | Status: SHIPPED | OUTPATIENT
Start: 2024-10-27 | End: 2024-11-07 | Stop reason: HOSPADM

## 2024-10-27 RX ORDER — SIMETHICONE 80 MG
40 TABLET,CHEWABLE ORAL EVERY 6 HOURS PRN
Qty: 30 TABLET | Refills: 1 | Status: SHIPPED | OUTPATIENT
Start: 2024-10-27 | End: 2024-10-30

## 2024-10-27 RX ADMIN — OXYCODONE HYDROCHLORIDE 5 MG: 5 TABLET ORAL at 00:39

## 2024-10-27 RX ADMIN — PIPERACILLIN SODIUM AND TAZOBACTAM SODIUM 3.38 G: 3; .375 INJECTION, POWDER, LYOPHILIZED, FOR SOLUTION INTRAVENOUS at 03:28

## 2024-10-27 RX ADMIN — ACETAMINOPHEN 325 MG: 325 TABLET ORAL at 09:46

## 2024-10-27 RX ADMIN — POTASSIUM PHOSPHATE, MONOBASIC AND POTASSIUM PHOSPHATE, DIBASIC 15 MMOL: 224; 236 INJECTION, SOLUTION, CONCENTRATE INTRAVENOUS at 13:55

## 2024-10-27 RX ADMIN — OXYCODONE HYDROCHLORIDE 5 MG: 5 TABLET ORAL at 16:34

## 2024-10-27 RX ADMIN — Medication 10 ML: at 09:47

## 2024-10-27 RX ADMIN — POTASSIUM PHOSPHATE, MONOBASIC AND POTASSIUM PHOSPHATE, DIBASIC 15 MMOL: 224; 236 INJECTION, SOLUTION, CONCENTRATE INTRAVENOUS at 10:45

## 2024-10-27 RX ADMIN — OXYCODONE HYDROCHLORIDE 5 MG: 5 TABLET ORAL at 12:34

## 2024-10-27 RX ADMIN — PIPERACILLIN SODIUM AND TAZOBACTAM SODIUM 3.38 G: 3; .375 INJECTION, POWDER, LYOPHILIZED, FOR SOLUTION INTRAVENOUS at 09:50

## 2024-10-27 RX ADMIN — ACETAMINOPHEN 325 MG: 325 TABLET ORAL at 03:21

## 2024-10-27 NOTE — PLAN OF CARE
Goal Outcome Evaluation:  Plan of Care Reviewed With: patient           Outcome Evaluation: VSS. Pt voiding without difficulty. Ambulates frequently. Redressed DANNA site dressing. Was saturated and started trickling around tegaderm. Site intact w/ no s/s of infection. Pain controlled with po pain medication per pt. IV anbx given. Pt is dts.

## 2024-10-27 NOTE — PROGRESS NOTES
Patient Care Team:  Rashawn Mcgovern MD as PCP - General (Family Medicine)    Subjective     Aubree Alegria is POD 3 from exploratory laparoscopy and evacuation of intra-abdominal abscess.  She states today she is tolerating clears and decreased nausea and vomiting.  Her pain has slightly improved.  She continues to have flatus.  She is tolerating regular diet without nausea or vomiting.       Review of Systems:     Review of Systems - General ROS: negative  Respiratory ROS: no cough, shortness of breath, or wheezing  Cardiovascular ROS: no chest pain or dyspnea on exertion  Gastrointestinal ROS: positive for - abdominal pain and gas/bloating  negative for - blood in stools, diarrhea, heartburn, or nausea/vomiting    Objective     Vital Signs  /76 (BP Location: Right arm, Patient Position: Lying)   Pulse 86   Temp 98.7 °F (37.1 °C) (Temporal)   Resp 18   LMP 10/07/2024 (Approximate) Comment: Neg HCG  SpO2 98%     Physical Exam:  Physical Exam  Constitutional:       General: She is not in acute distress.     Appearance: Normal appearance. She is normal weight.   Abdominal:      General: Bowel sounds are normal. There is no distension.      Tenderness: There is abdominal tenderness. There is no guarding or rebound.      Comments: Drink fluid serosanguineous in appearance.  Output is decreasing.  Tenderness in the appropriate locations of the incision site.   Neurological:      Mental Status: She is alert.           Results Review:    Labs reviewed  Lab Results (last 24 hours)       Procedure Component Value Units Date/Time    Body Fluid Culture - Body Fluid, Pelvis [[id number]]  (Abnormal)  (Susceptibility) Collected: 10/24/24 9757    Specimen: Body Fluid from Pelvis Updated: 10/27/24 4252     Body Fluid Culture Moderate growth (3+) Enterococcus faecalis     Gram Stain Many (4+) WBCs per low power field      Few (2+) Gram positive cocci      Few (2+) Gram negative bacilli    Susceptibility         Enterococcus faecalis      RUBEN      Ampicillin Susceptible      Gentamicin High Level Synergy Susceptible      Vancomycin Susceptible                           Comprehensive Metabolic Panel [[id number]]  (Abnormal) Collected: 10/27/24 0445    Specimen: Blood Updated: 10/27/24 0529     Glucose 118 mg/dL      BUN 6 mg/dL      Creatinine 0.70 mg/dL      Sodium 138 mmol/L      Potassium 3.2 mmol/L      Chloride 103 mmol/L      CO2 21.0 mmol/L      Calcium 7.7 mg/dL      Total Protein 5.3 g/dL      Albumin 2.5 g/dL      ALT (SGPT) 14 U/L      AST (SGOT) 15 U/L      Alkaline Phosphatase 45 U/L      Total Bilirubin 0.2 mg/dL      Globulin 2.8 gm/dL      A/G Ratio 0.9 g/dL      BUN/Creatinine Ratio 8.6     Anion Gap 14.0 mmol/L      eGFR 108.2 mL/min/1.73     Narrative:      GFR Normal >60  Chronic Kidney Disease <60  Kidney Failure <15      CBC (No Diff) [[id number]]  (Abnormal) Collected: 10/27/24 0445    Specimen: Blood Updated: 10/27/24 0513     WBC 7.43 10*3/mm3      RBC 3.37 10*6/mm3      Hemoglobin 7.7 g/dL      Hematocrit 25.2 %      MCV 74.8 fL      MCH 22.8 pg      MCHC 30.6 g/dL      RDW 17.8 %      RDW-SD 48.2 fl      MPV 10.7 fL      Platelets 378 10*3/mm3     Blood Culture - Blood, Arm, Left [[id number]]  (Normal) Collected: 10/24/24 2045    Specimen: Blood from Arm, Left Updated: 10/26/24 2115     Blood Culture No growth at 2 days    Blood Culture - Blood, Arm, Right [[id number]]  (Normal) Collected: 10/24/24 2039    Specimen: Blood from Arm, Right Updated: 10/26/24 2115     Blood Culture No growth at 2 days              Medication Reviewed:   Current Facility-Administered Medications   Medication Dose Route Frequency Provider Last Rate Last Admin    acetaminophen (TYLENOL) tablet 325 mg  325 mg Oral Q8H Goran Rdz MD   325 mg at 10/27/24 0946    dexAMETHasone (DECADRON) injection 4 mg  4 mg Intravenous Q8H PRN Goran Rdz MD        diphenhydrAMINE (BENADRYL) injection 25 mg  25 mg Intravenous Q6H  Goran Vasquez MD        labetalol (NORMODYNE,TRANDATE) injection 10 mg  10 mg Intravenous Q30 Min Goran Vasquez MD        naloxone (NARCAN) injection 0.1 mg  0.1 mg Intravenous Q5 Min PRGoran Colorado MD        ondansetron (ZOFRAN) 8 mg/50 mL NS IVPB  8 mg Intravenous Q6H PRGoran Colorado MD        oxyCODONE (ROXICODONE) immediate release tablet 5 mg  5 mg Oral Q4H PRN Goran Rdz MD   5 mg at 10/27/24 0039    potassium phosphates 15 mmol in sodium chloride 0.9 % 250 mL infusion  15 mmol Intravenous Q3H Goran Rdz MD   15 mmol at 10/27/24 1045    scopolamine patch 1 mg/72 hr  1 patch Transdermal Q72H Goran Rdz MD        simethicone (MYLICON) chewable tablet 40 mg  40 mg Oral 4x Daily PRGoran Colorado MD        sodium chloride 0.9 % flush 1-10 mL  1-10 mL Intravenous PRN Goran Rdz MD        sodium chloride 0.9 % flush 10 mL  10 mL Intravenous Q12H Goran Rdz MD   10 mL at 10/27/24 0947       Assessment & Plan  POD 3 from the above procedure.  Laboratory values continue to improve.  H&H has gone up.  Leukocytosis resolved.  Patient will be discharged home today.  I explained continue monitoring her DANNA outputs daily.  She was educated on how to empty her DANNA drain and record the values daily.  She is to follow-up with my services this Wednesday at approximately 9 AM.  She will be sent home on 3 additional days of oral antibiotics of Bactrim and postoperative medications for oral potassium and as needed nausea and pain medications.  Starting replacement of her potassium prior to her discharge.  Explained treatment plan with patient and family.      Goran Rdz MD  10/27/24  10:57 CDT

## 2024-10-27 NOTE — DISCHARGE SUMMARY
Sandra Alegria  : 1980  MRN: [id number]  CSN: [id number]    Discharge Summary      Date of Admission: 10/24/2024   Date of Discharge: 10/27/2024   Discharge Diagnosis: Postoperative intra-abdominal abscess   Procedures Performed: Procedure(s):  Laparoscopy with washout of pelvic abscess      Consults: General surgery   Brief History: Patient is a 44 y.o.who presented 3 days postop laparoscopic hysterectomy with fever and imaging findings suggesting abscess.   Hospital Course: She was admitted and taken to the operating room, undergoing the above procedure.  She was then transferred to the floor for continued IV antibiotic therapy.  She had quick improvement in her leukocytosis and symptoms.  Culture results returned showing Enterococcus, pansensitive.  On postoperative day 3 she was felt to be stable for discharge.   Pending Studies: Final blood cultures, currently no growth to date   Condition at discharge: gradually improving   Discharge Medications:    Your medication list        START taking these medications        Instructions Last Dose Given Next Dose Due   acetaminophen 500 MG tablet  Commonly known as: TYLENOL      Take 1 tablet by mouth Every 6 (Six) Hours As Needed for Mild Pain for up to 20 days.       amoxicillin-clavulanate 875-125 MG per tablet  Commonly known as: AUGMENTIN      Take 1 tablet by mouth 2 (Two) Times a Day for 3 days.       naloxone 4 MG/0.1ML nasal spray  Commonly known as: NARCAN      Call 911. Don't prime. [place] in 1 nostril for overdose. Repeat in 2-3 minutes in other nostril if no or minimal breathing/responsiveness.       ondansetron ODT 4 MG disintegrating tablet  Commonly known as: ZOFRAN-ODT      Place 1 tablet on the tongue Every 8 (Eight) Hours As Needed for Nausea or Vomiting.       oxyCODONE 5 MG immediate release tablet  Commonly known as: ROXICODONE      Take 1 tablet by mouth Every 8 (Eight) Hours As Needed for Moderate Pain for up to 7 days.        potassium chloride 20 MEQ CR tablet  Commonly known as: KLOR-CON M20      Take 1 tablet by mouth 2 (Two) Times a Day.       simethicone 80 MG chewable tablet  Commonly known as: MYLICON      Chew 0.5 tablets Every 6 (Six) Hours As Needed for Flatulence (belching).              STOP taking these medications      oxyCODONE-acetaminophen 5-325 MG per tablet  Commonly known as: Percocet                  Where to Get Your Medications        These medications were sent to [id number] - [place], [place] - [address] AT [address] - [phone] [facility] [phone]   [address] [place] [address]      Phone: [phone]   acetaminophen 500 MG tablet  amoxicillin-clavulanate 875-125 MG per tablet  naloxone 4 MG/0.1ML nasal spray  ondansetron ODT 4 MG disintegrating tablet  oxyCODONE 5 MG immediate release tablet  potassium chloride 20 MEQ CR tablet  simethicone 80 MG chewable tablet        Discharge Disposition: home   Follow-up: Future Appointments   Date Time Provider Department Center   11/6/2024  8:45 AM Kathy Marie MD [facility]        Follow-up with Dr. Rdz and Dr. Marie in 3 days    This note has been electronically signed.    Colin Reyes MD  October 27, 2024  11:16 CDT

## 2024-10-27 NOTE — PROGRESS NOTES
Aubree Alegria  : 1980  MRN: [id number]  [facility]: [id number]    Post-operative Day #6/3  Subjective   Pain well-controlled.  She is tolerating regular diet and passing flatus, but has not had a bowel movement yet.  She is ambulating well.     Objective     Min/max vitals past 24 hours:   Temp  Min: 98.3 °F (36.8 °C)  Max: 99.3 °F (37.4 °C)  BP  Min: 104/58  Max: 134/75  Pulse  Min: 86  Max: 90  Pulse  Min: 86  Max: 90        I/O last 3 completed shifts:  In: 3089.3 [I.V.:2889.3; IV Piggyback:200]  Out: 2868 [Urine:2750; Drains:118]    General: well developed; well nourished  no acute distress   Abdomen: Soft, nondistended, appropriately tender, DANNA drain with serous fluid   Pelvic: Not performed   Ext: Calves NT     Lab Results   Component Value Date    WBC 7.43 10/27/2024    HGB 7.7 (L) 10/27/2024    HCT 25.2 (L) 10/27/2024    MCV 74.8 (L) 10/27/2024     10/27/2024       Abdominal cavity culture positive Enterococcus, pansensitive  Blood cultures no growth to date     Assessment   Post-op Day #6/3 S/P laparoscopic hysterectomy and subsequent washout of intra-abdominal abscess     Plan   WBC is normal, and she has remained afebrile.  Antibiotics have been appropriate based on culture results.  Okay for discharge home today on oral antibiotics from GYN point of view.  Follow-up in the office later this week.    Colin Reyes MD  10/27/2024  11:06 CDT

## 2024-10-28 ENCOUNTER — TELEPHONE (OUTPATIENT)
Dept: OBSTETRICS AND GYNECOLOGY | Age: 44
End: 2024-10-28
Payer: COMMERCIAL

## 2024-10-28 LAB — BACTERIA SPEC ANAEROBE CULT: ABNORMAL

## 2024-10-28 NOTE — PROGRESS NOTES
Called and spoke with pt. Pt voiced she is feeling much better since second surgery and was discharged home last night. Pt has post op for 11-6-24.

## 2024-10-28 NOTE — PAYOR COMM NOTE
"NM HOME 10-27-24    Una Hinson (44 y.o. Female)       Date of Birth   1980    Social Security Number       Address   [address] [facility] [address]    Home Phone   [phone]    MRN   [id number]       [facility]    Mar[facility] Status                               Admission Date   10/24/24    Admission Type   Urgent    Admitting Provider   Sami Villalobos MD    Attending Provider       Department, Room/Bed   [facility] MOTHER BABY 2A, M201/1       Discharge Date   10/27/2024    Discharge Disposition   Home or Self Care    Discharge Destination                                 Attending Provider: (none)   Allergies: Ketorolac Tromethamine, Tramadol    Isolation: None   Infection: None   Code Status: Prior    Ht: 157 cm (61.81\")   Wt: 56.6 kg (124 lb 12.5 oz)    Admission Cmt: None   Principal Problem: Postoperative intra-abdominal abscess [T81.43XA,K65.1]                   Active Insurance as of 10/24/2024       Primary Coverage       Payor Plan Insurance Group Employer/Plan Group    [employer] BLUE CROSS [employer] BLUE CROSS BLUE SHIELD PPO [id number]       Payor Plan Address Payor Plan Phone Number Payor Plan Fax Number Effective Dates    [address] [phone]  2023 - None Entered    [place] [address]         Subscriber Name Subscriber Birth Date Member ID       UNA HINSON 1980 [id number]                     Emergency Contacts        (Rel.) Home Phone Work Phone Mobile Phone    BROCK GOMEZ (Father) [phone] -- --                 Discharge Summary        Colin Reyes MD at 10/27/24 [address] [place] [address]  Una Hinson  : 1980  MRN: [id number]  CSN: [id number]    Discharge Summary      Date of Admission: 10/24/2024   Date of Discharge: 10/27/2024   Discharge Diagnosis: Postoperative intra-abdominal abscess   Procedures Performed: Procedure(s):  Laparoscopy with washout of pelvic abscess      Consults: General surgery "   Brief History: Patient is a 44 y.o.who presented 3 days postop laparoscopic hysterectomy with fever and imaging findings suggesting abscess.   Hospital Course: She was admitted and taken to the operating room, undergoing the above procedure.  She was then transferred to the floor for continued IV antibiotic therapy.  She had quick improvement in her leukocytosis and symptoms.  Culture results returned showing Enterococcus, pansensitive.  On postoperative day 3 she was felt to be stable for discharge.   Pending Studies: Final blood cultures, currently no growth to date   Condition at discharge: gradually improving   Discharge Medications:    Your medication list        START taking these medications        Instructions Last Dose Given Next Dose Due   acetaminophen 500 MG tablet  Commonly known as: TYLENOL      Take 1 tablet by mouth Every 6 (Six) Hours As Needed for Mild Pain for up to 20 days.       amoxicillin-clavulanate 875-125 MG per tablet  Commonly known as: AUGMENTIN      Take 1 tablet by mouth 2 (Two) Times a Day for 3 days.       naloxone 4 MG/0.1ML nasal spray  Commonly known as: NARCAN      Call 911. Don't prime. [place] in 1 nostril for overdose. Repeat in 2-3 minutes in other nostril if no or minimal breathing/responsiveness.       ondansetron ODT 4 MG disintegrating tablet  Commonly known as: ZOFRAN-ODT      Place 1 tablet on the tongue Every 8 (Eight) Hours As Needed for Nausea or Vomiting.       oxyCODONE 5 MG immediate release tablet  Commonly known as: ROXICODONE      Take 1 tablet by mouth Every 8 (Eight) Hours As Needed for Moderate Pain for up to 7 days.       potassium chloride 20 MEQ CR tablet  Commonly known as: KLOR-CON M20      Take 1 tablet by mouth 2 (Two) Times a Day.       simethicone 80 MG chewable tablet  Commonly known as: MYLICON      Chew 0.5 tablets Every 6 (Six) Hours As Needed for Flatulence (belching).              STOP taking these medications      oxyCODONE-acetaminophen  5-325 MG per tablet  Commonly known as: Percocet                  Where to Get Your Medications        These medications were sent to [id number] - [place], [place] - [address] AT [address] - [phone]  - [phone]  [address] [place] [address]      Phone: [phone]   acetaminophen 500 MG tablet  amoxicillin-clavulanate 875-125 MG per tablet  naloxone 4 MG/0.1ML nasal spray  ondansetron ODT 4 MG disintegrating tablet  oxyCODONE 5 MG immediate release tablet  potassium chloride 20 MEQ CR tablet  simethicone 80 MG chewable tablet        Discharge Disposition: home   Follow-up: Future Appointments   Date Time Provider Department Center   11/6/2024  8:45 AM Kathy Marie MD [facility]        Follow-up with Dr. Rdz and Dr. Marie in 3 days    This note has been electronically signed.    Colin Reyes MD  October 27, 2024  11:16 CDT     Electronically signed by Colin Reyes MD at 10/27/24 5013

## 2024-10-29 LAB
BACTERIA SPEC AEROBE CULT: NORMAL
BACTERIA SPEC AEROBE CULT: NORMAL

## 2024-10-30 ENCOUNTER — OFFICE VISIT (OUTPATIENT)
Dept: BARIATRICS/WEIGHT MGMT | Facility: CLINIC | Age: 44
End: 2024-10-30
Payer: COMMERCIAL

## 2024-10-30 ENCOUNTER — OFFICE VISIT (OUTPATIENT)
Dept: OBSTETRICS AND GYNECOLOGY | Age: 44
End: 2024-10-30
Payer: COMMERCIAL

## 2024-10-30 ENCOUNTER — LAB (OUTPATIENT)
Dept: LAB | Facility: HOSPITAL | Age: 44
End: 2024-10-30
Payer: COMMERCIAL

## 2024-10-30 VITALS
BODY MASS INDEX: 21.16 KG/M2 | HEIGHT: 62 IN | SYSTOLIC BLOOD PRESSURE: 122 MMHG | DIASTOLIC BLOOD PRESSURE: 90 MMHG | WEIGHT: 115 LBS

## 2024-10-30 VITALS
HEIGHT: 62 IN | TEMPERATURE: 98.4 F | SYSTOLIC BLOOD PRESSURE: 120 MMHG | HEART RATE: 77 BPM | OXYGEN SATURATION: 98 % | DIASTOLIC BLOOD PRESSURE: 75 MMHG | BODY MASS INDEX: 22.19 KG/M2 | WEIGHT: 120.6 LBS

## 2024-10-30 DIAGNOSIS — K65.1 POSTOPERATIVE INTRA-ABDOMINAL ABSCESS: ICD-10-CM

## 2024-10-30 DIAGNOSIS — Z90.710 S/P LAPAROSCOPIC HYSTERECTOMY: ICD-10-CM

## 2024-10-30 DIAGNOSIS — K65.1 POSTOPERATIVE INTRA-ABDOMINAL ABSCESS: Primary | ICD-10-CM

## 2024-10-30 DIAGNOSIS — Z98.890 S/P LAPAROSCOPY WITH LYSIS OF ADHESIONS: Primary | ICD-10-CM

## 2024-10-30 DIAGNOSIS — Z98.890 S/P LAPAROSCOPY WITH LYSIS OF ADHESIONS: ICD-10-CM

## 2024-10-30 DIAGNOSIS — T81.43XA POSTOPERATIVE INTRA-ABDOMINAL ABSCESS: ICD-10-CM

## 2024-10-30 DIAGNOSIS — T81.43XA POSTOPERATIVE INTRA-ABDOMINAL ABSCESS: Primary | ICD-10-CM

## 2024-10-30 LAB — POTASSIUM SERPL-SCNC: 4.2 MMOL/L (ref 3.5–5.2)

## 2024-10-30 PROCEDURE — 84132 ASSAY OF SERUM POTASSIUM: CPT

## 2024-10-30 PROCEDURE — 36415 COLL VENOUS BLD VENIPUNCTURE: CPT

## 2024-10-30 NOTE — PROGRESS NOTES
"Subjective   Chief Complaint   Patient presents with    Post-op     Pt here today for 1 week post op Dacinci TLH BSO. Pt voices feeling much better. Pt voices no concerns.      Aubree Alegria is a 44 y.o. year old  presenting to be seen for her post-operative visit.  She had a Da Stevan TLH with bilateral salpingo-oophorectomy  1 week ago.  Post-op course complicated by readmission to the hospital for intra-abdominal abscess, with return to the OR for irrigation and diagnostic scope.  The patient did not have any bowel injury and was released from the hospital 2 days later when bowel function had returned.  Currently she reports to be having what she thinks is normal postop soreness 1 week out from surgery.  Additionally, she just left Dr. Rdz's office, after having had her DANNA drain pulled just a few minutes ago.  Patient no vaginal bleeding.  Bladder and bowel function seem normal.  Patient does not feel like she is having hot flashes and night sweats at this time, but is only been 1 week since surgery and for part of that time she was having fevers/chills.    No Additional Complaints Reported    The following portions of the patient's history were reviewed and updated as appropriate:current medications and allergies    Review of Systems      Objective   /90   Ht 157.5 cm (62\")   Wt 52.2 kg (115 lb)   LMP 10/07/2024 (Approximate) Comment: Neg HCG  BMI 21.03 kg/m²     General:  well developed; well nourished  no acute distress  mentation appropriate   Abdomen: Appropriately tender, soft.  All incisions clean dry and intact.  There is gauze over the opening where her DANNA was just pulled   Pelvis: Clinical staff was present for exam.  Speculum exam reveals cuff to be well supported and well-approximated     Final Diagnosis   Uterus, cervix, bilateral fallopian tubes and ovaries, hysterectomy and bilateral salpingo-oophorectomy:  Chronic cervicitis with nabothian cysts.  Secretory endometrium.  Diffuse " adenomyosis.  Multiple myometrial leiomyomas (0.5 to 7 cm).  Unremarkable left and right fallopian tubes and fimbrial epithelial lining.  Corpus hemorrhagicum, endosalpingosis, and corpora albicantia of first ovary.  Multiple follicular cysts endosalpingosis, and corpora albicantia of second ovary.  Uterine weight of 370 g   Electronically signed by Tim Zamora MD on 10/22/2024 at 1321        Assessment   Pt is 1 week s/p Da Stevan TLH with bilateral salpingo-oophorectomy   Patient doing well, finishing her Augmentin, which she was discharged from the hospital with.  Bladder and bowel function are normal     Plan   Pathology reviewed as benign  Patient to continue lifting restrictions and pelvic rest  Return to the office in 4 weeks, or sooner as needed    No orders of the defined types were placed in this encounter.         This note was electronically signed.    Kathy Marie MD  October 30, 2024

## 2024-10-30 NOTE — PROGRESS NOTES
"  Patient Care Team:  Rashawn Mcgovern MD as PCP - General (Family Medicine)    Subjective     Aubree Alegria is a 44 y.o. female.     Aubree is post op approximately 1 week from her laparoscopic lysis of adhesions and evacuation of postoperative abscess.  She is currently on her regular diet.  She stated she is doing much better.  She has also had a bowel movement.  Denies pain.  Ambulating and tolerating p.o. food.  She provided us with her recorded daily drains output and they continue to trend down to approximately 5 cc.  For suture removal and removal of her abdominal drain.    Review Of Systems:  General ROS: negative  Respiratory ROS: no cough, shortness of breath, or wheezing  Cardiovascular ROS: no chest pain or dyspnea on exertion  Gastrointestinal ROS: no abdominal pain, change in bowel habits, or black or bloody stools    The following portions of the patient's history were reviewed and updated as appropriate: allergies, current medications, past family history, past medical history, past social history, past surgical history, and problem list.    Objective   /75 (BP Location: Right arm, Patient Position: Sitting, Cuff Size: Adult)   Pulse 77   Temp 98.4 °F (36.9 °C)   Ht 157.5 cm (62\")   Wt 54.7 kg (120 lb 9.6 oz)   LMP 10/07/2024 (Approximate) Comment: Neg HCG  SpO2 98%   BMI 22.06 kg/m²       10/30/24  0938   Weight: 54.7 kg (120 lb 9.6 oz)        General Appearance:  awake, alert, oriented, in no acute distress  Abdomen:  Soft, non-tender, normal bowel sounds; no bruits, organomegaly or masses.  Abnormal shape: normal  Wounds: clean, dry, intact  Drainage serosanguineous in color.    ASSESSMENT/ PLAN    Encounter Diagnoses   Name Primary?    S/P laparoscopy with lysis of adhesions Yes    Postoperative intra-abdominal abscess      Plan: Removal of sutures from her laparoscopic port sites completed without difficulty.  Removal of drain without difficulty as well.  Placed new dressings " over areas.  The patient and I discussed their weight restrictions which is defined as avoid lifting greater than 15 lbs for at least 4 weeks to allow healing of her tissue.  I also discussed the avoidance of driving or operating a motor vehicle if she requires, needs taking pain medication, or has a distracting injury or pain because of the risk of injuring herself or others.  She is to return to our office as needed.  Potassium levels have returned to normal.  She was instructed to stop taking her potassium supplements.    10/30/24  11:08 CDT  Patient Care Team:  Rashawn Mcgovern MD as PCP - General (Family Medicine)  Goran Rdz MD FACS

## 2024-11-03 ENCOUNTER — APPOINTMENT (OUTPATIENT)
Dept: CT IMAGING | Facility: HOSPITAL | Age: 44
End: 2024-11-03
Payer: COMMERCIAL

## 2024-11-03 ENCOUNTER — APPOINTMENT (OUTPATIENT)
Dept: GENERAL RADIOLOGY | Facility: HOSPITAL | Age: 44
End: 2024-11-03
Payer: COMMERCIAL

## 2024-11-03 ENCOUNTER — HOSPITAL ENCOUNTER (INPATIENT)
Facility: HOSPITAL | Age: 44
LOS: 4 days | Discharge: HOME | End: 2024-11-07
Attending: OBSTETRICS & GYNECOLOGY | Admitting: OBSTETRICS & GYNECOLOGY
Payer: COMMERCIAL

## 2024-11-03 DIAGNOSIS — Z90.710 HISTORY OF HYSTERECTOMY: ICD-10-CM

## 2024-11-03 DIAGNOSIS — T81.49XA POSTOPERATIVE ABSCESS: Primary | ICD-10-CM

## 2024-11-03 DIAGNOSIS — R50.9 FEVER, UNSPECIFIED FEVER CAUSE: ICD-10-CM

## 2024-11-03 LAB
ALBUMIN SERPL-MCNC: 3.9 G/DL (ref 3.5–5.2)
ALBUMIN/GLOB SERPL: 1.1 G/DL
ALP SERPL-CCNC: 70 U/L (ref 39–117)
ALT SERPL W P-5'-P-CCNC: 32 U/L (ref 1–33)
ANION GAP SERPL CALCULATED.3IONS-SCNC: 10 MMOL/L (ref 5–15)
AST SERPL-CCNC: 18 U/L (ref 1–32)
B PARAPERT DNA SPEC QL NAA+PROBE: NOT DETECTED
B PERT DNA SPEC QL NAA+PROBE: NOT DETECTED
BASOPHILS # BLD AUTO: 0.07 10*3/MM3 (ref 0–0.2)
BASOPHILS NFR BLD AUTO: 0.4 % (ref 0–1.5)
BILIRUB SERPL-MCNC: 0.2 MG/DL (ref 0–1.2)
BILIRUB UR QL STRIP: NEGATIVE
BUN SERPL-MCNC: 12 MG/DL (ref 6–20)
BUN/CREAT SERPL: 19.4 (ref 7–25)
C PNEUM DNA NPH QL NAA+NON-PROBE: NOT DETECTED
CALCIUM SPEC-SCNC: 8.6 MG/DL (ref 8.6–10.5)
CHLORIDE SERPL-SCNC: 104 MMOL/L (ref 98–107)
CLARITY UR: CLEAR
CO2 SERPL-SCNC: 25 MMOL/L (ref 22–29)
COLOR UR: YELLOW
CREAT SERPL-MCNC: 0.62 MG/DL (ref 0.57–1)
CRP SERPL-MCNC: 5.54 MG/DL (ref 0–0.5)
D DIMER PPP FEU-MCNC: 2.75 MCGFEU/ML (ref 0–0.5)
D-LACTATE SERPL-SCNC: 1.2 MMOL/L (ref 0.5–2)
DEPRECATED RDW RBC AUTO: 48.7 FL (ref 37–54)
EGFRCR SERPLBLD CKD-EPI 2021: 111.4 ML/MIN/1.73
EOSINOPHIL # BLD AUTO: 0.03 10*3/MM3 (ref 0–0.4)
EOSINOPHIL NFR BLD AUTO: 0.2 % (ref 0.3–6.2)
ERYTHROCYTE [DISTWIDTH] IN BLOOD BY AUTOMATED COUNT: 18 % (ref 12.3–15.4)
ERYTHROCYTE [SEDIMENTATION RATE] IN BLOOD: 116 MM/HR (ref 0–20)
FLUAV SUBTYP SPEC NAA+PROBE: NOT DETECTED
FLUBV RNA ISLT QL NAA+PROBE: NOT DETECTED
GLOBULIN UR ELPH-MCNC: 3.6 GM/DL
GLUCOSE SERPL-MCNC: 113 MG/DL (ref 65–99)
GLUCOSE UR STRIP-MCNC: NEGATIVE MG/DL
HADV DNA SPEC NAA+PROBE: NOT DETECTED
HCOV 229E RNA SPEC QL NAA+PROBE: NOT DETECTED
HCOV HKU1 RNA SPEC QL NAA+PROBE: NOT DETECTED
HCOV NL63 RNA SPEC QL NAA+PROBE: NOT DETECTED
HCOV OC43 RNA SPEC QL NAA+PROBE: NOT DETECTED
HCT VFR BLD AUTO: 29.5 % (ref 34–46.6)
HGB BLD-MCNC: 9.1 G/DL (ref 12–15.9)
HGB UR QL STRIP.AUTO: NEGATIVE
HMPV RNA NPH QL NAA+NON-PROBE: NOT DETECTED
HPIV1 RNA ISLT QL NAA+PROBE: NOT DETECTED
HPIV2 RNA SPEC QL NAA+PROBE: NOT DETECTED
HPIV3 RNA NPH QL NAA+PROBE: NOT DETECTED
HPIV4 P GENE NPH QL NAA+PROBE: NOT DETECTED
IMM GRANULOCYTES # BLD AUTO: 0.28 10*3/MM3 (ref 0–0.05)
IMM GRANULOCYTES NFR BLD AUTO: 1.5 % (ref 0–0.5)
KETONES UR QL STRIP: NEGATIVE
LEUKOCYTE ESTERASE UR QL STRIP.AUTO: NEGATIVE
LIPASE SERPL-CCNC: 28 U/L (ref 13–60)
LYMPHOCYTES # BLD AUTO: 2.38 10*3/MM3 (ref 0.7–3.1)
LYMPHOCYTES NFR BLD AUTO: 12.7 % (ref 19.6–45.3)
M PNEUMO IGG SER IA-ACNC: NOT DETECTED
MCH RBC QN AUTO: 23.2 PG (ref 26.6–33)
MCHC RBC AUTO-ENTMCNC: 30.8 G/DL (ref 31.5–35.7)
MCV RBC AUTO: 75.1 FL (ref 79–97)
MONOCYTES # BLD AUTO: 0.87 10*3/MM3 (ref 0.1–0.9)
MONOCYTES NFR BLD AUTO: 4.6 % (ref 5–12)
NEUTROPHILS NFR BLD AUTO: 15.09 10*3/MM3 (ref 1.7–7)
NEUTROPHILS NFR BLD AUTO: 80.6 % (ref 42.7–76)
NITRITE UR QL STRIP: NEGATIVE
NRBC BLD AUTO-RTO: 0 /100 WBC (ref 0–0.2)
PH UR STRIP.AUTO: 6 [PH] (ref 5–8)
PLATELET # BLD AUTO: 633 10*3/MM3 (ref 140–450)
PMV BLD AUTO: 10.5 FL (ref 6–12)
POTASSIUM SERPL-SCNC: 4.1 MMOL/L (ref 3.5–5.2)
PROCALCITONIN SERPL-MCNC: 0.08 NG/ML (ref 0–0.25)
PROT SERPL-MCNC: 7.5 G/DL (ref 6–8.5)
PROT UR QL STRIP: NEGATIVE
RBC # BLD AUTO: 3.93 10*6/MM3 (ref 3.77–5.28)
RHINOVIRUS RNA SPEC NAA+PROBE: NOT DETECTED
RSV RNA NPH QL NAA+NON-PROBE: NOT DETECTED
SARS-COV-2 RNA NPH QL NAA+NON-PROBE: NOT DETECTED
SODIUM SERPL-SCNC: 139 MMOL/L (ref 136–145)
SP GR UR STRIP: 1.02 (ref 1–1.03)
UROBILINOGEN UR QL STRIP: NORMAL
WBC NRBC COR # BLD AUTO: 18.72 10*3/MM3 (ref 3.4–10.8)

## 2024-11-03 PROCEDURE — 83605 ASSAY OF LACTIC ACID: CPT

## 2024-11-03 PROCEDURE — 99285 EMERGENCY DEPT VISIT HI MDM: CPT

## 2024-11-03 PROCEDURE — 81003 URINALYSIS AUTO W/O SCOPE: CPT

## 2024-11-03 PROCEDURE — 83690 ASSAY OF LIPASE: CPT

## 2024-11-03 PROCEDURE — 0202U NFCT DS 22 TRGT SARS-COV-2: CPT

## 2024-11-03 PROCEDURE — 25810000003 SODIUM CHLORIDE 0.9 % SOLUTION

## 2024-11-03 PROCEDURE — 25010000002 PIPERACILLIN SOD-TAZOBACTAM PER 1 G

## 2024-11-03 PROCEDURE — 87040 BLOOD CULTURE FOR BACTERIA: CPT

## 2024-11-03 PROCEDURE — 85025 COMPLETE CBC W/AUTO DIFF WBC: CPT

## 2024-11-03 PROCEDURE — 25510000001 IOPAMIDOL 61 % SOLUTION

## 2024-11-03 PROCEDURE — 36415 COLL VENOUS BLD VENIPUNCTURE: CPT

## 2024-11-03 PROCEDURE — G0378 HOSPITAL OBSERVATION PER HR: HCPCS

## 2024-11-03 PROCEDURE — 86140 C-REACTIVE PROTEIN: CPT

## 2024-11-03 PROCEDURE — 84145 PROCALCITONIN (PCT): CPT

## 2024-11-03 PROCEDURE — 85379 FIBRIN DEGRADATION QUANT: CPT

## 2024-11-03 PROCEDURE — 85652 RBC SED RATE AUTOMATED: CPT

## 2024-11-03 PROCEDURE — 25510000001 IOPAMIDOL PER 1 ML

## 2024-11-03 PROCEDURE — 71045 X-RAY EXAM CHEST 1 VIEW: CPT

## 2024-11-03 PROCEDURE — 74177 CT ABD & PELVIS W/CONTRAST: CPT

## 2024-11-03 PROCEDURE — 25810000003 SODIUM CHLORIDE 0.9 % SOLUTION: Performed by: OBSTETRICS & GYNECOLOGY

## 2024-11-03 PROCEDURE — 80053 COMPREHEN METABOLIC PANEL: CPT

## 2024-11-03 PROCEDURE — 71275 CT ANGIOGRAPHY CHEST: CPT

## 2024-11-03 RX ORDER — IBUPROFEN 600 MG/1
600 TABLET, FILM COATED ORAL EVERY 6 HOURS PRN
Status: DISCONTINUED | OUTPATIENT
Start: 2024-11-03 | End: 2024-11-07 | Stop reason: HOSPADM

## 2024-11-03 RX ORDER — ONDANSETRON 2 MG/ML
4 INJECTION INTRAMUSCULAR; INTRAVENOUS EVERY 6 HOURS PRN
Status: DISCONTINUED | OUTPATIENT
Start: 2024-11-03 | End: 2024-11-07 | Stop reason: HOSPADM

## 2024-11-03 RX ORDER — ACETAMINOPHEN 500 MG
1000 TABLET ORAL ONCE
Status: DISCONTINUED | OUTPATIENT
Start: 2024-11-03 | End: 2024-11-03

## 2024-11-03 RX ORDER — SODIUM CHLORIDE 9 MG/ML
40 INJECTION, SOLUTION INTRAVENOUS AS NEEDED
Status: DISCONTINUED | OUTPATIENT
Start: 2024-11-03 | End: 2024-11-07 | Stop reason: HOSPADM

## 2024-11-03 RX ORDER — SODIUM CHLORIDE 0.9 % (FLUSH) 0.9 %
10 SYRINGE (ML) INJECTION AS NEEDED
Status: DISCONTINUED | OUTPATIENT
Start: 2024-11-03 | End: 2024-11-04 | Stop reason: SDUPTHER

## 2024-11-03 RX ORDER — ONDANSETRON 4 MG/1
4 TABLET, ORALLY DISINTEGRATING ORAL EVERY 6 HOURS PRN
Status: DISCONTINUED | OUTPATIENT
Start: 2024-11-03 | End: 2024-11-07 | Stop reason: HOSPADM

## 2024-11-03 RX ORDER — ACETAMINOPHEN 325 MG/1
650 TABLET ORAL EVERY 4 HOURS PRN
Status: DISCONTINUED | OUTPATIENT
Start: 2024-11-03 | End: 2024-11-07 | Stop reason: HOSPADM

## 2024-11-03 RX ORDER — SODIUM CHLORIDE 0.9 % (FLUSH) 0.9 %
10 SYRINGE (ML) INJECTION EVERY 12 HOURS SCHEDULED
Status: DISCONTINUED | OUTPATIENT
Start: 2024-11-03 | End: 2024-11-07 | Stop reason: HOSPADM

## 2024-11-03 RX ORDER — SODIUM CHLORIDE 0.9 % (FLUSH) 0.9 %
10 SYRINGE (ML) INJECTION AS NEEDED
Status: DISCONTINUED | OUTPATIENT
Start: 2024-11-03 | End: 2024-11-07 | Stop reason: HOSPADM

## 2024-11-03 RX ORDER — IOPAMIDOL 755 MG/ML
100 INJECTION, SOLUTION INTRAVASCULAR
Status: COMPLETED | OUTPATIENT
Start: 2024-11-03 | End: 2024-11-03

## 2024-11-03 RX ORDER — SODIUM CHLORIDE 9 MG/ML
125 INJECTION, SOLUTION INTRAVENOUS CONTINUOUS
Status: DISPENSED | OUTPATIENT
Start: 2024-11-03 | End: 2024-11-05

## 2024-11-03 RX ORDER — IOPAMIDOL 612 MG/ML
100 INJECTION, SOLUTION INTRAVASCULAR
Status: COMPLETED | OUTPATIENT
Start: 2024-11-03 | End: 2024-11-03

## 2024-11-03 RX ADMIN — IOPAMIDOL 100 ML: 612 INJECTION, SOLUTION INTRAVENOUS at 20:08

## 2024-11-03 RX ADMIN — IOPAMIDOL 100 ML: 755 INJECTION, SOLUTION INTRAVENOUS at 20:53

## 2024-11-03 RX ADMIN — PIPERACILLIN SODIUM AND TAZOBACTAM SODIUM 3.38 G: 3; .375 INJECTION, POWDER, LYOPHILIZED, FOR SOLUTION INTRAVENOUS at 21:10

## 2024-11-03 RX ADMIN — SODIUM CHLORIDE 125 ML/HR: 9 INJECTION, SOLUTION INTRAVENOUS at 23:05

## 2024-11-03 RX ADMIN — SODIUM CHLORIDE 1000 ML: 9 INJECTION, SOLUTION INTRAVENOUS at 20:11

## 2024-11-03 RX ADMIN — IBUPROFEN 600 MG: 600 TABLET, FILM COATED ORAL at 22:27

## 2024-11-04 LAB
BASOPHILS # BLD AUTO: 0.06 10*3/MM3 (ref 0–0.2)
BASOPHILS NFR BLD AUTO: 0.4 % (ref 0–1.5)
DEPRECATED RDW RBC AUTO: 49.4 FL (ref 37–54)
EOSINOPHIL # BLD AUTO: 0.1 10*3/MM3 (ref 0–0.4)
EOSINOPHIL NFR BLD AUTO: 0.7 % (ref 0.3–6.2)
ERYTHROCYTE [DISTWIDTH] IN BLOOD BY AUTOMATED COUNT: 17.9 % (ref 12.3–15.4)
HCT VFR BLD AUTO: 27.2 % (ref 34–46.6)
HGB BLD-MCNC: 8.1 G/DL (ref 12–15.9)
IMM GRANULOCYTES # BLD AUTO: 0.11 10*3/MM3 (ref 0–0.05)
IMM GRANULOCYTES NFR BLD AUTO: 0.8 % (ref 0–0.5)
LYMPHOCYTES # BLD AUTO: 1.89 10*3/MM3 (ref 0.7–3.1)
LYMPHOCYTES NFR BLD AUTO: 13.9 % (ref 19.6–45.3)
MCH RBC QN AUTO: 22.7 PG (ref 26.6–33)
MCHC RBC AUTO-ENTMCNC: 29.8 G/DL (ref 31.5–35.7)
MCV RBC AUTO: 76.2 FL (ref 79–97)
MONOCYTES # BLD AUTO: 0.72 10*3/MM3 (ref 0.1–0.9)
MONOCYTES NFR BLD AUTO: 5.3 % (ref 5–12)
NEUTROPHILS NFR BLD AUTO: 10.72 10*3/MM3 (ref 1.7–7)
NEUTROPHILS NFR BLD AUTO: 78.9 % (ref 42.7–76)
NRBC BLD AUTO-RTO: 0 /100 WBC (ref 0–0.2)
PLATELET # BLD AUTO: 503 10*3/MM3 (ref 140–450)
PMV BLD AUTO: 10.2 FL (ref 6–12)
RBC # BLD AUTO: 3.57 10*6/MM3 (ref 3.77–5.28)
WBC NRBC COR # BLD AUTO: 13.6 10*3/MM3 (ref 3.4–10.8)

## 2024-11-04 PROCEDURE — G0378 HOSPITAL OBSERVATION PER HR: HCPCS

## 2024-11-04 PROCEDURE — 25010000002 ONDANSETRON PER 1 MG: Performed by: OBSTETRICS & GYNECOLOGY

## 2024-11-04 PROCEDURE — 99024 POSTOP FOLLOW-UP VISIT: CPT | Performed by: OBSTETRICS & GYNECOLOGY

## 2024-11-04 PROCEDURE — 25010000002 PIPERACILLIN SOD-TAZOBACTAM PER 1 G: Performed by: OBSTETRICS & GYNECOLOGY

## 2024-11-04 PROCEDURE — 25010000002 HEPARIN (PORCINE) PER 1000 UNITS: Performed by: OBSTETRICS & GYNECOLOGY

## 2024-11-04 PROCEDURE — 85025 COMPLETE CBC W/AUTO DIFF WBC: CPT | Performed by: OBSTETRICS & GYNECOLOGY

## 2024-11-04 PROCEDURE — 25810000003 SODIUM CHLORIDE 0.9 % SOLUTION: Performed by: OBSTETRICS & GYNECOLOGY

## 2024-11-04 RX ORDER — DOCUSATE SODIUM 100 MG/1
100 CAPSULE, LIQUID FILLED ORAL 2 TIMES DAILY
Status: DISCONTINUED | OUTPATIENT
Start: 2024-11-04 | End: 2024-11-07 | Stop reason: HOSPADM

## 2024-11-04 RX ORDER — OXYCODONE HYDROCHLORIDE 5 MG/1
5 TABLET ORAL EVERY 4 HOURS PRN
Status: DISCONTINUED | OUTPATIENT
Start: 2024-11-04 | End: 2024-11-07 | Stop reason: HOSPADM

## 2024-11-04 RX ORDER — HEPARIN SODIUM 5000 [USP'U]/ML
5000 INJECTION, SOLUTION INTRAVENOUS; SUBCUTANEOUS EVERY 12 HOURS SCHEDULED
Status: DISCONTINUED | OUTPATIENT
Start: 2024-11-04 | End: 2024-11-07 | Stop reason: HOSPADM

## 2024-11-04 RX ORDER — CYCLOBENZAPRINE HCL 10 MG
10 TABLET ORAL EVERY 8 HOURS PRN
Status: DISCONTINUED | OUTPATIENT
Start: 2024-11-04 | End: 2024-11-07 | Stop reason: HOSPADM

## 2024-11-04 RX ADMIN — ACETAMINOPHEN 650 MG: 325 TABLET ORAL at 12:24

## 2024-11-04 RX ADMIN — IBUPROFEN 600 MG: 600 TABLET, FILM COATED ORAL at 07:47

## 2024-11-04 RX ADMIN — ACETAMINOPHEN 650 MG: 325 TABLET ORAL at 02:14

## 2024-11-04 RX ADMIN — ACETAMINOPHEN 650 MG: 325 TABLET ORAL at 20:52

## 2024-11-04 RX ADMIN — PIPERACILLIN SODIUM AND TAZOBACTAM SODIUM 3.38 G: 3; .375 INJECTION, POWDER, LYOPHILIZED, FOR SOLUTION INTRAVENOUS at 11:00

## 2024-11-04 RX ADMIN — ONDANSETRON 4 MG: 2 INJECTION INTRAMUSCULAR; INTRAVENOUS at 19:22

## 2024-11-04 RX ADMIN — IBUPROFEN 600 MG: 600 TABLET, FILM COATED ORAL at 19:13

## 2024-11-04 RX ADMIN — PIPERACILLIN SODIUM AND TAZOBACTAM SODIUM 3.38 G: 3; .375 INJECTION, POWDER, LYOPHILIZED, FOR SOLUTION INTRAVENOUS at 02:21

## 2024-11-04 RX ADMIN — DOCUSATE SODIUM 100 MG: 100 CAPSULE, LIQUID FILLED ORAL at 10:17

## 2024-11-04 RX ADMIN — SODIUM CHLORIDE 125 ML/HR: 9 INJECTION, SOLUTION INTRAVENOUS at 11:00

## 2024-11-04 RX ADMIN — HEPARIN SODIUM 5000 UNITS: 5000 INJECTION, SOLUTION INTRAVENOUS; SUBCUTANEOUS at 20:53

## 2024-11-04 RX ADMIN — OXYCODONE HYDROCHLORIDE 5 MG: 5 TABLET ORAL at 22:06

## 2024-11-04 RX ADMIN — PIPERACILLIN SODIUM AND TAZOBACTAM SODIUM 3.38 G: 3; .375 INJECTION, POWDER, LYOPHILIZED, FOR SOLUTION INTRAVENOUS at 18:47

## 2024-11-04 RX ADMIN — HEPARIN SODIUM 5000 UNITS: 5000 INJECTION, SOLUTION INTRAVENOUS; SUBCUTANEOUS at 12:22

## 2024-11-04 RX ADMIN — DOCUSATE SODIUM 100 MG: 100 CAPSULE, LIQUID FILLED ORAL at 20:53

## 2024-11-04 NOTE — ED NOTES
Nursing report ED to floor  Aubree Alegria  44 y.o.  female    HPI:   Chief Complaint   Patient presents with    Post-op Problem    Fever       Admitting doctor:   Kathy Marie MD    Consulting provider(s):  Consults       No orders found from 10/5/2024 to 11/4/2024.             Admitting diagnosis:   The primary encounter diagnosis was Postoperative abscess. Diagnoses of Fever, unspecified fever cause and History of hysterectomy were also pertinent to this visit.    Code status:   Current Code Status       Date Active Code Status Order ID Comments User Context       11/3/2024 2147 CPR (Attempt to Resuscitate) [id number]  Lance Mcclain DO ED        Question Answer    Code Status (Patient has no pulse and is not breathing) CPR (Attempt to Resuscitate)    Medical Interventions (Patient has pulse or is breathing) Full Support                    Allergies:   Ketorolac tromethamine and Tramadol    Intake and Output    Intake/Output Summary (Last 24 hours) at 11/3/2024 2156  Last data filed at 11/3/2024 2148  Gross per 24 hour   Intake 1100 ml   Output --   Net 1100 ml       Weight:       11/03/24  1904   Weight: 54.9 kg (121 lb)       Most recent vitals:   Vitals:    11/03/24 2109 11/03/24 2116 11/03/24 2131 11/03/24 2146   BP: 129/79 132/96 153/88 132/78   BP Location:       Patient Position:       Pulse: 94 111 100 95   Resp:       Temp:       TempSrc:       SpO2: 99% 100% 99% 100%   Weight:       Height:         Oxygen Therapy: .    Active LDAs/IV Access:   Lines, Drains & Airways       Active LDAs       Name Placement date Placement time Site Days    Peripheral IV Anterior;Right Forearm --  --  Forearm  --    Peripheral IV 11/03/24 1940 Anterior;Left Forearm 11/03/24 1940  Forearm  less than 1    Closed/Suction Drain 1 RLQ Bulb 10/25/24  0038  RLQ  9                    Labs (abnormal labs have a star):   Labs Reviewed   COMPREHENSIVE METABOLIC PANEL - Abnormal; Notable for the following components:        "Result Value    Glucose 113 (*)     All other components within normal limits    Narrative:     GFR Normal >60  Chronic Kidney Disease <60  Kidney Failure <15     D-DIMER, QUANTITATIVE - Abnormal; Notable for the following components:    D-Dimer, Quantitative 2.75 (*)     All other components within normal limits    Narrative:     According to the assay 's published package insert, a normal (<0.50 MCGFEU/mL) D-dimer result in conjunction with a non-high clinical probability assessment, excludes deep vein thrombosis (DVT) and pulmonary embolism (PE) with high sensitivity.    D-dimer values increase with age and this can make VTE exclusion of an older population difficult. To address this, the [employer], based on best available evidence and recent guidelines, recommends that clinicians use age-adjusted D-dimer thresholds in patients greater than 50 years of age with: a) a low probability of PE who do not meet all Pulmonary Embolism Rule Out Criteria, or b) in those with intermediate probability of PE.   The formula for an age-adjusted D-dimer cut-off is \"age/100\".  For example, a 60 year old patient would have an age-adjusted cut-off of 0.60 MCGFEU/mL and an 80 year old 0.80 MCGFEU/mL.   C-REACTIVE PROTEIN - Abnormal; Notable for the following components:    C-Reactive Protein 5.54 (*)     All other components within normal limits   SEDIMENTATION RATE - Abnormal; Notable for the following components:    Sed Rate 116 (*)     All other components within normal limits   CBC WITH AUTO DIFFERENTIAL - Abnormal; Notable for the following components:    WBC 18.72 (*)     Hemoglobin 9.1 (*)     Hematocrit 29.5 (*)     MCV 75.1 (*)     MCH 23.2 (*)     MCHC 30.8 (*)     RDW 18.0 (*)     Platelets 633 (*)     Neutrophil % 80.6 (*)     Lymphocyte % 12.7 (*)     Monocyte % 4.6 (*)     Eosinophil % 0.2 (*)     Immature Grans % 1.5 (*)     Neutrophils, Absolute 15.09 (*)     Immature Grans, Absolute " "0.28 (*)     All other components within normal limits   RESPIRATORY PANEL PCR W/ COVID-19 (SARS-COV-2), NP SWAB IN UTM/VTP, 2 HR TAT - Normal    Narrative:     In the setting of a positive respiratory panel with a viral infection PLUS a negative procalcitonin without other underlying concern for bacterial infection, consider observing off antibiotics or discontinuation of antibiotics and continue supportive care. If the respiratory panel is positive for atypical bacterial infection (Bordetella pertussis, Chlamydophila pneumoniae, or Mycoplasma pneumoniae), consider antibiotic de-escalation to target atypical bacterial infection.   URINALYSIS W/ CULTURE IF INDICATED - Normal    Narrative:     In absence of clinical symptoms, the presence of pyuria, bacteria, and/or nitrites on the urinalysis result does not correlate with infection.  Urine microscopic not indicated.   LIPASE - Normal   LACTIC ACID, PLASMA - Normal   PROCALCITONIN - Normal    Narrative:     As a Marker for Sepsis (Non-Neonates):    1. <0.5 ng/mL represents a low risk of severe sepsis and/or septic shock.  2. >2 ng/mL represents a high risk of severe sepsis and/or septic shock.    As a Marker for Lower Respiratory Tract Infections that require antibiotic therapy:    PCT on Admission    Antibiotic Therapy       6-12 Hrs later    >0.5                Strongly Recommended  >0.25 - <0.5        Recommended   0.1 - 0.25          Discouraged              Remeasure/reassess PCT  <0.1                Strongly Discouraged     Remeasure/reassess PCT    As 28 day mortality risk marker: \"Change in Procalcitonin Result\" (>80% or <=80%) if Day 0 (or Day 1) and Day 4 values are available. Refer to http://www.Rep[employer]-pct-calculator.com    Change in PCT <=80%  A decrease of PCT levels below or equal to 80% defines a positive change in PCT test result representing a higher risk for 28-day all-cause mortality of patients diagnosed with severe sepsis for septic " shock.    Change in PCT >80%  A decrease of PCT levels of more than 80% defines a negative change in PCT result representing a lower risk for 28-day all-cause mortality of patients diagnosed with severe sepsis or septic shock.      BLOOD CULTURE   BLOOD CULTURE   CBC AND DIFFERENTIAL    Narrative:     The following orders were created for panel order CBC & Differential.  Procedure                               Abnormality         Status                     ---------                               -----------         ------                     CBC Auto Differential[[id number]]        Abnormal            Final result                 Please view results for these tests on the individual orders.       Meds given in ED:   Medications   sodium chloride 0.9 % flush 10 mL (has no administration in time range)   acetaminophen (TYLENOL) tablet 1,000 mg (1,000 mg Oral Not Given 11/3/24 1946)   sodium chloride 0.9 % bolus 1,000 mL (0 mL Intravenous Stopped 11/3/24 2148)   iopamidol (ISOVUE-300) 61 % injection 100 mL (100 mL Intravenous Given 11/3/24 2008)   iopamidol (ISOVUE-370) 76 % injection 100 mL (100 mL Intravenous Given 11/3/24 2053)   piperacillin-tazobactam (ZOSYN) 3.375 g IVPB in 100 mL NS MBP (CD) (0 g Intravenous Stopped 11/3/24 2148)           NIH Stroke Scale:       Isolation/Infection(s):  No active isolations   No active infections     COVID Testing  Collected .  Resulted .    Nursing report ED to floor:  Mental status: .  Ambulatory status: .  Precautions: .    ED nurse phone extentsion- ..

## 2024-11-04 NOTE — PLAN OF CARE
Goal Outcome Evaluation:  Plan of Care Reviewed With: patient        Progress: improving  Outcome Evaluation: VSS. pain controlled w tylenol and motrin. iv abx given. NPO started at midnight.

## 2024-11-04 NOTE — PROGRESS NOTES
"Pharmacy Dosing Service  Antimicrobial Dosing  Zosyn    Assessment/Action/Plan:  Zosyn 3.375 gm IV every 6 hours has been converted to extended-infusion Zosyn 3.375 gm IV over 4 hours every 8 hours. Pharmacy will continue to monitor daily and make further adjustment(s) accordingly.     Subjective:  Aubree Algeria is a 44 y.o. female  initiated on extended-infusion Zosyn 3.375 gm IV every 8 hours for the treatment of Intra-abdominal Infection , day 1 of 3 of treatment.    Objective:  Ht: 157.5 cm (62\"); Wt: 54.9 kg (121 lb)  Estimated Creatinine Clearance: 98.3 mL/min (by C-G formula based on SCr of 0.62 mg/dL).   Creatinine   Date Value Ref Range Status   11/03/2024 0.62 0.57 - 1.00 mg/dL Final      Lab Results   Component Value Date    WBC 18.72 (H) 11/03/2024      Baseline culture results:  Microbiology Results (last 10 days)       Procedure Component Value - Date/Time    Respiratory Panel PCR w/COVID-19(SARS-CoV-2) CLARISA/CHARLEE/DALJIT/PAD/COR/ALLY In-House, NP Swab in UTM/VTM, 2 HR TAT - Swab, Nasopharynx [[id number]]  (Normal) Collected: 11/03/24 1952    Lab Status: Final result Specimen: Swab from Nasopharynx Updated: 11/03/24 2057     ADENOVIRUS, PCR Not Detected     Coronavirus 229E Not Detected     Coronavirus HKU1 Not Detected     Coronavirus NL63 Not Detected     Coronavirus OC43 Not Detected     COVID19 Not Detected     Human Metapneumovirus Not Detected     Human Rhinovirus/Enterovirus Not Detected     Influenza A PCR Not Detected     Influenza B PCR Not Detected     Parainfluenza Virus 1 Not Detected     Parainfluenza Virus 2 Not Detected     Parainfluenza Virus 3 Not Detected     Parainfluenza Virus 4 Not Detected     RSV, PCR Not Detected     Bordetella pertussis pcr Not Detected     Bordetella parapertussis PCR Not Detected     Chlamydophila pneumoniae PCR Not Detected     Mycoplasma pneumo by PCR Not Detected    Narrative:      In the setting of a positive respiratory panel with a viral infection PLUS a " negative procalcitonin without other underlying concern for bacterial infection, consider observing off antibiotics or discontinuation of antibiotics and continue supportive care. If the respiratory panel is positive for atypical bacterial infection (Bordetella pertussis, Chlamydophila pneumoniae, or Mycoplasma pneumoniae), consider antibiotic de-escalation to target atypical bacterial infection.    Anaerobic Culture - Body Fluid, Pelvis [[id number]]  (Abnormal) Collected: 10/24/24 2327    Lab Status: Final result Specimen: Body Fluid from Pelvis Updated: 10/28/24 0630     Anaerobic Culture Bacteroides fragilis group     Comment: Beta-lactamase negative       Body Fluid Culture - Body Fluid, Pelvis [[id number]]  (Abnormal)  (Susceptibility) Collected: 10/24/24 2327    Lab Status: Final result Specimen: Body Fluid from Pelvis Updated: 10/27/24 0714     Body Fluid Culture Moderate growth (3+) Enterococcus faecalis     Gram Stain Many (4+) WBCs per low power field      Few (2+) Gram positive cocci      Few (2+) Gram negative bacilli    Susceptibility        Enterococcus faecalis      RUBEN      Ampicillin <=2 ug/ml Susceptible      Gentamicin High Level Synergy SYN-S ug/ml Susceptible      Vancomycin 1 ug/ml Susceptible                                   Marco Garza, PharmJAKOB  11/03/24 22:15 CST

## 2024-11-04 NOTE — PROGRESS NOTES
Sandra Alegria  : 1980  MRN: [id number]  [facility]: [id number]    Post-operative Day #14 from TOTAL LAPAROSCOPIC HYSTERECTOMY with BSO, DaVinci, and POD #11 return to OR for wash-out of pelvic abscess  Subjective   Medications being utilized for pain control: acetaminophen and ibuprofen (OTC).  Patient return to the emergency room last night when she had fevers, chills, and nausea.  During the patient's previous hospitalization she had Zosyn every 8 hours for several days.  She was then discharged home on Augmentin and just completed that antibiotic 3 days ago.  The patient says she was doing well, with just typical postop soreness, until yesterday.  At present, the patient reports most of her discomfort is actually in her flank back, although urinalysis in the emergency room was completely negative.    A chest x-ray at the time of admission was negative for any findings.    CT Angiogram Chest    Result Date: 11/3/2024  1. No evidence of pulmonary thromboembolic disease. The thoracic aorta is normal in caliber with no evidence of dissection or aneurysm. The lungs are clear. 2. Left breast cyst.  This report was signed and finalized on 11/3/2024 9:03 PM by Dr. Tera Ward MD.      CT Abdomen Pelvis With Contrast    Result Date: 11/3/2024  1. The patient has undergone recent hysterectomy. There is a postoperative abscess noted at the level of the apex of the vaginal cuff containing some air. This shows interval improvement from the previous examination of 10/24/2024. There is  persistent inflammatory stranding within the pelvis. There is also air within the urinary bladder with mild enhancement of the bladder wall and stranding in the perivesical space. Correlation is recommended with the patient's urinalysis. No free fluid in the pelvis. 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 3. No evidence of nephrolithiasis or obstructive uropathy. No free flowing effusion present within the abdomen  or pelvis. 4. Constipation with increased stool within the right and transverse colon. No mechanical bowel obstruction..    This report was signed and finalized on 11/3/2024 8:21 PM by Dr. Tera Ward MD.      XR Chest 1 View    Result Date: 11/3/2024  Impression: No evidence of acute cardiopulmonary disease.  This report was signed and finalized on 11/3/2024 7:31 PM by Dr. Tera Ward MD.           Objective     Min/max vitals past 24 hours:   Temp  Min: 98.2 °F (36.8 °C)  Max: 99.2 °F (37.3 °C)  BP  Min: 99/61  Max: 153/88  Pulse  Min: 75  Max: 111  Resp  Min: 18  Max: 18        I/O last 3 completed shifts:  In: 1100 [IV Piggyback:1100]  Out: -     General: well developed; well nourished  no acute distress   Lungs:  Abdomen: breathing is unlabored  soft, mildly-tender in RLQ; no masses  incisions are healing, clean, dry, intact, and without drainage   Pelvic: Not performed   Ext: Calves NT     WBC   Date/Time Value Ref Range Status   11/03/2024 2043 18.72 (H) 3.40 - 10.80 10*3/mm3 Final   11/02/2023 1500 4.98 3.40 - 10.80 10*3/mm3 Final     Hemoglobin   Date/Time Value Ref Range Status   11/03/2024 2043 9.1 (L) 12.0 - 15.9 g/dL Final     Hematocrit   Date/Time Value Ref Range Status   11/03/2024 2043 29.5 (L) 34.0 - 46.6 % Final     Platelets   Date/Time Value Ref Range Status   11/03/2024 2043 633 (H) 140 - 450 10*3/mm3 Final        Assessment   Post-op Day #14 and 11 S/P DaVinci TOTAL LAPAROSCOPIC HYSTERECTOMY with BSO, followed a few days later by return to OR for pelvic wash-out and adhesiolysis  Although the patient has been afebrile since arrival to the hospital, she reports a fever of 101.5 yesterday at home.  She is also ejective fever/chills    WBCs elevated at 18.7 on admission.  The patient's previous antibiotic therapy seems like it should have certainly been adequate, especially after pelvic washout with antibiotic fluid.  Chest x-ray negative.  Urinalysis negative.     Plan   I do not plan  to take the patient back to the operating room at this time.  Okay for regular diet  General Surgery being advised that patient is back in the hospital; she has requested that they be consulted  Patient back on Zosyn 0.375 g IV every 8 hours  Heparin added twice daily since there was thrombosis noted in the right gonadal vein CT  Also adding Colace twice daily since the CT notes patient to be constipated    Kathy Marie MD  11/4/2024  07:43 CST

## 2024-11-04 NOTE — ED PROVIDER NOTES
Subjective   History of Present Illness  Patient is a 44-year-old female who presents emergency department with multiple complaints.  Reports recent hysterectomy on 10/21/2024.  Patient reports that 3 days later she developed a fever and had a postoperative abscess.  Was taken back to the OR for drainage of this.  Patient reports that she was discharged 1 week ago from today.  Patient has been taking Augmentin.  Reports she had her follow-ups this past Wednesday with general surgery and GYN.  DANNA drain was removed at that appointment.  Patient reports that she developed a fever today.  Reports temperature as high as 101.5.  States that she just does not feel well.  She reports pain to her bilateral shoulders, right lateral ribs, lower back, abdomen.  Reports a tight sensation in her abdomen.  Reports nausea, no vomiting.  No diarrhea. She denies any urinary symptoms.  Denies cough.  Denies chest pain.  Denies shortness of breath.        Review of Systems   Constitutional:  Positive for chills, fatigue and fever.   Gastrointestinal:  Positive for abdominal pain and nausea.   Musculoskeletal:  Positive for arthralgias and myalgias.        Right rib pain   All other systems reviewed and are negative.      Past Medical History:   Diagnosis Date    Spinal headache        Allergies   Allergen Reactions    Ketorolac Tromethamine Hives    Tramadol Hives       Past Surgical History:   Procedure Laterality Date    APPENDECTOMY      BREAST BIOPSY Bilateral     benign     SECTION      x2    DIAGNOSTIC LAPAROSCOPY N/A 10/24/2024    Procedure: DIAGNOSTIC LAPAROSCOPY WITH DAVINCI ROBOT;  Surgeon: Sami Villalobos MD;  Location: [facility] OR;  Service: Obstetrics/Gynecology;  Laterality: N/A;    DIAGNOSTIC LAPAROSCOPY N/A 10/24/2024    Procedure: DIAGNOSTIC LAPAROSCOPY WITH DAVINCI ROBOT;  Surgeon: Goran Rdz MD;  Location: [facility] OR;  Service: General;  Laterality: N/A;    ECTOPIC PREGNANCY      TOTAL LAPAROSCOPIC  HYSTERECTOMY SALPINGO OOPHORECTOMY Bilateral 10/21/2024    Procedure: TOTAL LAPAROSCOPIC HYSTERECTOMY BILATERAL SALPINGOOPHORECTOMY WITH DAVINCI ROBOT;  Surgeon: Kathy Marie MD;  Location: [facility];  Service: Robotics - DaVinci;  Laterality: Bilateral;    TUBAL ABDOMINAL LIGATION      WISDOM TOOTH EXTRACTION         Family History   Problem Relation Age of Onset    Kidney cancer Maternal Aunt     Hypertension Father     No Known Problems Mother     No Known Problems Sister     Leukemia Paternal Grandmother     Breast cancer Paternal Aunt 40    Ovarian cancer Neg Hx     Uterine cancer Neg Hx     Colon cancer Neg Hx     Melanoma Neg Hx     Prostate cancer Neg Hx        Social History     Socioeconomic History    Marital status:    Tobacco Use    Smoking status: Some Days     Types: Cigarettes     Passive exposure: Current    Smokeless tobacco: Never   Vaping Use    Vaping status: Never Used   Substance and Sexual Activity    Alcohol use: Never    Drug use: Never    Sexual activity: Yes     Partners: Male     Birth control/protection: Surgical           Objective   Physical Exam  Vitals and nursing note reviewed.   Constitutional:       General: She is not in acute distress.     Appearance: Normal appearance. She is normal weight. She is not ill-appearing or toxic-appearing.   HENT:      Head: Normocephalic.   Cardiovascular:      Rate and Rhythm: Normal rate and regular rhythm.      Pulses: Normal pulses.      Heart sounds: Normal heart sounds.   Pulmonary:      Effort: Pulmonary effort is normal.      Breath sounds: Normal breath sounds.   Abdominal:      General: Abdomen is flat. Bowel sounds are normal. There is no distension.      Palpations: Abdomen is soft.      Tenderness: There is abdominal tenderness (Generalized). There is right CVA tenderness and left CVA tenderness.      Comments: Incisions appear clean dry and intact.  No erythema present.   Musculoskeletal:         General: Normal range of  "motion.      Cervical back: Normal range of motion and neck supple.   Skin:     General: Skin is warm and dry.   Neurological:      General: No focal deficit present.      Mental Status: She is alert and oriented to person, place, and time. Mental status is at baseline.   Psychiatric:         Mood and Affect: Mood normal.         Behavior: Behavior normal.         Thought Content: Thought content normal.         Judgment: Judgment normal.         Procedures       Labs Reviewed   COMPREHENSIVE METABOLIC PANEL - Abnormal; Notable for the following components:       Result Value    Glucose 113 (*)     All other components within normal limits    Narrative:     GFR Normal >60  Chronic Kidney Disease <60  Kidney Failure <15     D-DIMER, QUANTITATIVE - Abnormal; Notable for the following components:    D-Dimer, Quantitative 2.75 (*)     All other components within normal limits    Narrative:     According to the assay 's published package insert, a normal (<0.50 MCGFEU/mL) D-dimer result in conjunction with a non-high clinical probability assessment, excludes deep vein thrombosis (DVT) and pulmonary embolism (PE) with high sensitivity.    D-dimer values increase with age and this can make VTE exclusion of an older population difficult. To address this, the [employer], based on best available evidence and recent guidelines, recommends that clinicians use age-adjusted D-dimer thresholds in patients greater than 50 years of age with: a) a low probability of PE who do not meet all Pulmonary Embolism Rule Out Criteria, or b) in those with intermediate probability of PE.   The formula for an age-adjusted D-dimer cut-off is \"age/100\".  For example, a 60 year old patient would have an age-adjusted cut-off of 0.60 MCGFEU/mL and an 80 year old 0.80 MCGFEU/mL.   C-REACTIVE PROTEIN - Abnormal; Notable for the following components:    C-Reactive Protein 5.54 (*)     All other components within normal " limits   SEDIMENTATION RATE - Abnormal; Notable for the following components:    Sed Rate 116 (*)     All other components within normal limits   CBC WITH AUTO DIFFERENTIAL - Abnormal; Notable for the following components:    WBC 18.72 (*)     Hemoglobin 9.1 (*)     Hematocrit 29.5 (*)     MCV 75.1 (*)     MCH 23.2 (*)     MCHC 30.8 (*)     RDW 18.0 (*)     Platelets 633 (*)     Neutrophil % 80.6 (*)     Lymphocyte % 12.7 (*)     Monocyte % 4.6 (*)     Eosinophil % 0.2 (*)     Immature Grans % 1.5 (*)     Neutrophils, Absolute 15.09 (*)     Immature Grans, Absolute 0.28 (*)     All other components within normal limits   RESPIRATORY PANEL PCR W/ COVID-19 (SARS-COV-2), NP SWAB IN UTM/VTP, 2 HR TAT - Normal    Narrative:     In the setting of a positive respiratory panel with a viral infection PLUS a negative procalcitonin without other underlying concern for bacterial infection, consider observing off antibiotics or discontinuation of antibiotics and continue supportive care. If the respiratory panel is positive for atypical bacterial infection (Bordetella pertussis, Chlamydophila pneumoniae, or Mycoplasma pneumoniae), consider antibiotic de-escalation to target atypical bacterial infection.   URINALYSIS W/ CULTURE IF INDICATED - Normal    Narrative:     In absence of clinical symptoms, the presence of pyuria, bacteria, and/or nitrites on the urinalysis result does not correlate with infection.  Urine microscopic not indicated.   LIPASE - Normal   LACTIC ACID, PLASMA - Normal   PROCALCITONIN - Normal    Narrative:     As a Marker for Sepsis (Non-Neonates):    1. <0.5 ng/mL represents a low risk of severe sepsis and/or septic shock.  2. >2 ng/mL represents a high risk of severe sepsis and/or septic shock.    As a Marker for Lower Respiratory Tract Infections that require antibiotic therapy:    PCT on Admission    Antibiotic Therapy       6-12 Hrs later    >0.5                Strongly Recommended  >0.25 - <0.5         "Recommended   0.1 - 0.25          Discouraged              Remeasure/reassess PCT  <0.1                Strongly Discouraged     Remeasure/reassess PCT    As 28 day mortality risk marker: \"Change in Procalcitonin Result\" (>80% or <=80%) if Day 0 (or Day 1) and Day 4 values are available. Refer to http://www.[facility] [place]pct[facility]cal[facility]    Change in PCT <=80%  A decrease of PCT levels below or equal to 80% defines a positive change in PCT test result representing a higher risk for 28-day all-cause mortality of patients diagnosed with severe sepsis for septic shock.    Change in PCT >80%  A decrease of PCT levels of more than 80% defines a negative change in PCT result representing a lower risk for 28-day all-cause mortality of patients diagnosed with severe sepsis or septic shock.      BLOOD CULTURE   BLOOD CULTURE   CBC AND DIFFERENTIAL    Narrative:     The following orders were created for panel order CBC & Differential.  Procedure                               Abnormality         Status                     ---------                               -----------         ------                     CBC Auto Differential[[id number]]        Abnormal            Final result                 Please view results for these tests on the individual orders.     CT Angiogram Chest   Final Result   1. No evidence of pulmonary thromboembolic disease. The thoracic aorta   is normal in caliber with no evidence of dissection or aneurysm. The   lungs are clear.   2. Left breast cyst.       This report was signed and finalized on 11/3/2024 9:03 PM by Dr. Tera Ward MD.          CT Abdomen Pelvis With Contrast   Final Result   1. The patient has undergone recent hysterectomy. There is a   postoperative abscess noted at the level of the apex of the vaginal cuff   containing some air. This shows interval improvement from the previous   examination of 10/24/2024. There is  persistent inflammatory stranding   within the pelvis. There is " also air within the urinary bladder with   mild enhancement of the bladder wall and stranding in the perivesical   space. Correlation is recommended with the patient's urinalysis. No free   fluid in the pelvis.   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   3. No evidence of nephrolithiasis or obstructive uropathy. No free   flowing effusion present within the abdomen or pelvis.   4. Constipation with increased stool within the right and transverse   colon. No mechanical bowel obstruction..               This report was signed and finalized on 11/3/2024 8:21 PM by Dr. Tera Ward MD.          XR Chest 1 View   Final Result   Impression: No evidence of acute cardiopulmonary disease.       This report was signed and finalized on 11/3/2024 7:31 PM by Dr. Tera Ward MD.                  ED Course  ED Course as of 11/03/24 2138   Sun Nov 03, 2024 2055 Case has been discussed with on-call general surgeon, Dr. Paniagua.  Plan to call GYN to discuss further plan of care. [KR]   2111 Case discussed with Dr. Mcclain, GYN on call.  Plan to return my call after discussing with Dr. Goldstein. [KR]   2136 Case discussed with Dr. Mcclain. Plan to admit to GYN service for IV antibiotics. IV Zosyn given. Patient agreeable to plan. [KR]      ED Course User Index  [KR] Krystyna Sánchez APRN                                               Medical Decision Making      Final diagnoses:   Postoperative abscess   Fever, unspecified fever cause   History of hysterectomy       ED Disposition  ED Disposition       ED Disposition   Decision to Admit    Condition   --    Comment   Level of Care: Mother/Baby [15]   Diagnosis: Postoperative abscess [[id number]]   Admitting Physician: OZZY ASHLEY [[id number]]                 No follow-up provider specified.       Medication List      No changes were made to your prescriptions during this visit.            Krystyna Sánchez APRN  11/03/24 2139

## 2024-11-04 NOTE — H&P
[facility]   HISTORY AND PHYSICAL    Patient Name:Aubree Alegria  : 1980  MRN: [id number]  Primary Care Physician: Rashawn Mcgovern MD  Date of admission: 11/3/2024    Subjective   Subjective     Chief Complaint: fever     Aubree Alegria is a 44 y.o. female who presents to the ED with fever and abdominal pain, now 13 days postop from robotic assisted TLH/BSO, and 10 days postop following readmission and washout for a 11 cm pelvic abscess.  She had been doing well and saw both Dr. Rdz and Dr. Marie last week for the postop visits.  Her drain was pulled at that time.  In the last 24 hours she began running a fever again and feeling ill.  She denies significant change in her abdominal pain and has been taking Tylenol and Motrin for the discomfort.  She denies vaginal bleeding or vaginal discharge.  Her abdominal incisions are healing well.  Evaluation in the ED tonight showed a 6.4 cm cuff abscess with leukocytosis, elevated inflammatory markers.       Personal History     Past Medical History:   Diagnosis Date    Spinal headache        Past Surgical History:   Procedure Laterality Date    APPENDECTOMY      BREAST BIOPSY Bilateral     benign     SECTION      x2    DIAGNOSTIC LAPAROSCOPY N/A 10/24/2024    Procedure: DIAGNOSTIC LAPAROSCOPY WITH DAVINCI ROBOT;  Surgeon: Sami Villalobos MD;  Location: [facility] OR;  Service: Obstetrics/Gynecology;  Laterality: N/A;    DIAGNOSTIC LAPAROSCOPY N/A 10/24/2024    Procedure: DIAGNOSTIC LAPAROSCOPY WITH DAVINCI ROBOT;  Surgeon: Goran Rdz MD;  Location: [facility] OR;  Service: General;  Laterality: N/A;    ECTOPIC PREGNANCY      TOTAL LAPAROSCOPIC HYSTERECTOMY SALPINGO OOPHORECTOMY Bilateral 10/21/2024    Procedure: TOTAL LAPAROSCOPIC HYSTERECTOMY BILATERAL SALPINGOOPHORECTOMY WITH DAVINCI ROBOT;  Surgeon: Kathy Marie MD;  Location: [facility] OR;  Service: Robotics - DaVinci;  Laterality: Bilateral;    TUBAL ABDOMINAL LIGATION      WISDOM TOOTH  EXTRACTION         Family History: Her family history includes Breast cancer (age of onset: 40) in her paternal aunt; Hypertension in her father; Kidney cancer in her maternal aunt; Leukemia in her paternal grandmother; No Known Problems in her mother and sister.     Social History: She  reports that she has been smoking cigarettes. She has been exposed to tobacco smoke. She has never used smokeless tobacco. She reports that she does not drink alcohol and does not use drugs.    Home Medications:  acetaminophen, naloxone, ondansetron ODT, oxyCODONE, and potassium chloride    Allergies:  She is allergic to ketorolac tromethamine and tramadol.    Objective    Objective     Vitals:    Temp:  [99.2 °F (37.3 °C)] 99.2 °F (37.3 °C)  Heart Rate:  [] 111  Resp:  [18] 18  BP: (118-132)/(73-96) 132/96  Output by Drain (mL) 11/02/24 0701 - 11/02/24 1900 11/02/24 1901 - 11/03/24 0700 11/03/24 0701 - 11/03/24 1900 11/03/24 1901 - 11/03/24 2138 Range Total   Closed/Suction Drain 1 RLQ Bulb            Physical Exam  Constitutional:       General: She is not in acute distress.     Appearance: She is ill-appearing.   Abdominal:      General: Abdomen is flat.      Palpations: Abdomen is soft.      Tenderness: There is no abdominal tenderness. There is no guarding.   Neurological:      General: No focal deficit present.      Mental Status: She is alert.   Psychiatric:         Mood and Affect: Mood normal.          Result Review    Result Review:  I have personally reviewed the results from the time of this admission to 11/3/2024 21:38 CST and agree with these findings:  [x]  Laboratory list / accordion  []  Microbiology  [x]  Radiology  []  EKG/Telemetry   []  Cardiology/Vascular   []  Pathology  []  Old records  []  Other:  Most notable findings include:     Admission on 11/03/2024   Component Date Value Ref Range Status    Glucose 11/03/2024 113 (H)  65 - 99 mg/dL Final    BUN 11/03/2024 12  6 - 20 mg/dL Final    Creatinine  11/03/2024 0.62  0.57 - 1.00 mg/dL Final    Sodium 11/03/2024 139  136 - 145 mmol/L Final    Potassium 11/03/2024 4.1  3.5 - 5.2 mmol/L Final    Chloride 11/03/2024 104  98 - 107 mmol/L Final    CO2 11/03/2024 25.0  22.0 - 29.0 mmol/L Final    Calcium 11/03/2024 8.6  8.6 - 10.5 mg/dL Final    Total Protein 11/03/2024 7.5  6.0 - 8.5 g/dL Final    Albumin 11/03/2024 3.9  3.5 - 5.2 g/dL Final    ALT (SGPT) 11/03/2024 32  1 - 33 U/L Final    AST (SGOT) 11/03/2024 18  1 - 32 U/L Final    Alkaline Phosphatase 11/03/2024 70  39 - 117 U/L Final    Total Bilirubin 11/03/2024 0.2  0.0 - 1.2 mg/dL Final    Globulin 11/03/2024 3.6  gm/dL Final    A/G Ratio 11/03/2024 1.1  g/dL Final    BUN/Creatinine Ratio 11/03/2024 19.4  7.0 - 25.0 Final    Anion Gap 11/03/2024 10.0  5.0 - 15.0 mmol/L Final    eGFR 11/03/2024 111.4  >60.0 mL/min/1.73 Final    Color, UA 11/03/2024 Yellow  Yellow, Straw Final    Appearance, UA 11/03/2024 Clear  Clear Final    pH, UA 11/03/2024 6.0  5.0 - 8.0 Final    Specific Gravity, UA 11/03/2024 1.022  1.005 - 1.030 Final    Glucose, UA 11/03/2024 Negative  Negative Final    Ketones, UA 11/03/2024 Negative  Negative Final    Bilirubin, UA 11/03/2024 Negative  Negative Final    Blood, UA 11/03/2024 Negative  Negative Final    Protein, UA 11/03/2024 Negative  Negative Final    Leuk Esterase, UA 11/03/2024 Negative  Negative Final    Nitrite, UA 11/03/2024 Negative  Negative Final    Urobilinogen, UA 11/03/2024 0.2 E.U./dL  0.2 - 1.0 E.U./dL Final    Lipase 11/03/2024 28  13 - 60 U/L Final    D-Dimer, Quantitative 11/03/2024 2.75 (H)  0.00 - 0.50 MCGFEU/mL Final    Lactate 11/03/2024 1.2  0.5 - 2.0 mmol/L Final    Procalcitonin 11/03/2024 0.08  0.00 - 0.25 ng/mL Final    C-Reactive Protein 11/03/2024 5.54 (H)  0.00 - 0.50 mg/dL Final    Sed Rate 11/03/2024 116 (H)  0 - 20 mm/hr Final    ADENOVIRUS, PCR 11/03/2024 Not Detected  Not Detected Final    Coronavirus 229E 11/03/2024 Not Detected  Not Detected Final     Coronavirus HKU1 11/03/2024 Not Detected  Not Detected Final    Coronavirus NL63 11/03/2024 Not Detected  Not Detected Final    Coronavirus OC43 11/03/2024 Not Detected  Not Detected Final    COVID19 11/03/2024 Not Detected  Not Detected - Ref. Range Final    Human Metapneumovirus 11/03/2024 Not Detected  Not Detected Final    Human Rhinovirus/Enterovirus 11/03/2024 Not Detected  Not Detected Final    Influenza A PCR 11/03/2024 Not Detected  Not Detected Final    Influenza B PCR 11/03/2024 Not Detected  Not Detected Final    Parainfluenza Virus 1 11/03/2024 Not Detected  Not Detected Final    Parainfluenza Virus 2 11/03/2024 Not Detected  Not Detected Final    Parainfluenza Virus 3 11/03/2024 Not Detected  Not Detected Final    Parainfluenza Virus 4 11/03/2024 Not Detected  Not Detected Final    RSV, PCR 11/03/2024 Not Detected  Not Detected Final    Bordetella pertussis pcr 11/03/2024 Not Detected  Not Detected Final    Bordetella parapertussis PCR 11/03/2024 Not Detected  Not Detected Final    Chlamydophila pneumoniae PCR 11/03/2024 Not Detected  Not Detected Final    Mycoplasma pneumo by PCR 11/03/2024 Not Detected  Not Detected Final    WBC 11/03/2024 18.72 (H)  3.40 - 10.80 10*3/mm3 Final    RBC 11/03/2024 3.93  3.77 - 5.28 10*6/mm3 Final    Hemoglobin 11/03/2024 9.1 (L)  12.0 - 15.9 g/dL Final    Hematocrit 11/03/2024 29.5 (L)  34.0 - 46.6 % Final    MCV 11/03/2024 75.1 (L)  79.0 - 97.0 fL Final    MCH 11/03/2024 23.2 (L)  26.6 - 33.0 pg Final    MCHC 11/03/2024 30.8 (L)  31.5 - 35.7 g/dL Final    RDW 11/03/2024 18.0 (H)  12.3 - 15.4 % Final    RDW-SD 11/03/2024 48.7  37.0 - 54.0 fl Final    MPV 11/03/2024 10.5  6.0 - 12.0 fL Final    Platelets 11/03/2024 633 (H)  140 - 450 10*3/mm3 Final    Neutrophil % 11/03/2024 80.6 (H)  42.7 - 76.0 % Final    Lymphocyte % 11/03/2024 12.7 (L)  19.6 - 45.3 % Final    Monocyte % 11/03/2024 4.6 (L)  5.0 - 12.0 % Final    Eosinophil % 11/03/2024 0.2 (L)  0.3 - 6.2 % Final     Basophil % 11/03/2024 0.4  0.0 - 1.5 % Final    Immature Grans % 11/03/2024 1.5 (H)  0.0 - 0.5 % Final    Neutrophils, Absolute 11/03/2024 15.09 (H)  1.70 - 7.00 10*3/mm3 Final    Lymphocytes, Absolute 11/03/2024 2.38  0.70 - 3.10 10*3/mm3 Final    Monocytes, Absolute 11/03/2024 0.87  0.10 - 0.90 10*3/mm3 Final    Eosinophils, Absolute 11/03/2024 0.03  0.00 - 0.40 10*3/mm3 Final    Basophils, Absolute 11/03/2024 0.07  0.00 - 0.20 10*3/mm3 Final    Immature Grans, Absolute 11/03/2024 0.28 (H)  0.00 - 0.05 10*3/mm3 Final    nRBC 11/03/2024 0.0  0.0 - 0.2 /100 WBC Final         CT Abdomen Pelvis With Contrast    Result Date: 11/3/2024  CT ABDOMEN PELVIS W CONTRAST- 11/3/2024 6:57 PM  HISTORY: recent hysterectomy with postoperative abscess   COMPARISON: Outside CT dated 10/24/2024.  DLP: 271.98 mGy.cm. All CT scans are performed using dose optimization techniques as appropriate to the performed exam and including at least one of the following: Automated exposure control, adjustment of the mA and/or kV according to size, and the use of the iterative reconstruction technique.  TECHNIQUE: Following the intravenous administration of contrast, helical CT tomographic images of the abdomen and pelvis were acquired. Coronal reformatted images were also provided for review.  FINDINGS: The lung bases and base of the heart are unremarkable.  LIVER: No focal liver lesion. The hepatic vasculature is patent.  BILIARY SYSTEM: The gallbladder is unremarkable. No intrahepatic or extrahepatic ductal dilatation.  PANCREAS: No focal pancreatic lesion.  SPLEEN: Unremarkable.  KIDNEYS AND ADRENALS: Bilateral kidneys and adrenal glands are unremarkable. The ureters are decompressed and normal in appearance.  RETROPERITONEUM: No mass, lymphadenopathy or hemorrhage.  GI TRACT: There is constipation with increased stool within the right and transverse colon. The small bowel is normal in distribution and appearance. No evidence of mechanical  obstruction. No gastric wall thickening or gastric outlet obstruction.. The appendix is surgically absent..  OTHER: No mesenteric mass is demonstrated. No evidence of adenopathy in the small bowel mesentery.. 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 This is new from the previous examination. The patient has undergone recent hysterectomy.. The osseous structures and soft tissues demonstrate no worrisome lesions. A fat-containing periumbilical hernia is present.  PELVIS: The patient has undergone hysterectomy. There is a small volume of air within the urinary bladder. There is mild stranding in the perivesical space. At the apex of the vaginal cuff there is a fluid collection containing some air which measures approximately 6.4 x 4.4 cm in size consistent with a postoperative abscess. This shows significant interval improvement from the previous examination of 10/24/2024 at which time this collection measured 11.0 x 6.7 cm in size. There is residual inflammatory stranding within the pelvic fat.. No significant bladder wall thickening is present..      Impression: 1. The patient has undergone recent hysterectomy. There is a postoperative abscess noted at the level of the apex of the vaginal cuff containing some air. This shows interval improvement from the previous examination of 10/24/2024. There is  persistent inflammatory stranding within the pelvis. There is also air within the urinary bladder with mild enhancement of the bladder wall and stranding in the perivesical space. Correlation is recommended with the patient's urinalysis. No free fluid in the pelvis. 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 3. No evidence of nephrolithiasis or obstructive uropathy. No free flowing effusion present within the abdomen or pelvis. 4. Constipation with increased stool within the right and transverse colon. No mechanical bowel obstruction..    This report was signed and finalized on 11/3/2024 8:21 PM by   Tera Ward MD.           Assessment & Plan   Assessment / Plan     Brief Patient Summary:  Aubree Alegria is a 44 y.o. female with a history of a postop abscess status post washout 10 days ago, returns with new onset fever, with imaging today showing persistent but smaller pelvic abscess.  CT shows abscesses at the cuff measuring 6.4 x 4.4 cm.    Active Hospital Problems:  Active Hospital Problems    Diagnosis     **Postoperative abscess      Plan:   Admit to 2A  Zosyn q6hr  NPO after midnight  Repeat CBC in AM  I discussed with Dr. Goldstein regarding [place] [facility] [place] and consider drainage in AM.   Plan of care was discussed with patient and family. All questions were answered.     VTE Prophylaxis:  No VTE prophylaxis order currently exists.        Lance Mcclain, DO

## 2024-11-04 NOTE — CONSULTS
General Surgery   History and Physical     Referring Provider: No ref. provider found    Patient Care Team:  Rashawn Mcgovern MD as PCP - General (Family Medicine)    Chief complaint: Fever, nausea, post op day #14 from DaVinci TOTAL LAPAROSCOPIC HYSTERECTOMY with BSO, followed a few days later by return to OR for pelvic wash-out and adhesiolysis     Subjective      History of Present Illness      The patient is a 44 y.o. female who presented to the ER yesterday due to fever greater than 101, nausea and chills. Patient had a hysterectomy 14 days ago and returned for a wash out of a pelvic abscess. She was sent home and states she completed her Augmentin 3-4 days ago.  Patient presented with leukocytosis yesterday in the emergency room. CT revealed improvement of post operative abscess. She is currently on IV Zosyn and admits some nausea and has been afebrile since admission. She admits having 2-3 BMs daily.     Review of Systems    Review of Systems - General ROS: negative  ENT ROS: negative  Respiratory ROS: negative  Cardiovascular ROS: negative  Gastrointestinal ROS: no abdominal pain, change in bowel habits, or black or bloody stools    History  Past Medical History:   Diagnosis Date    Spinal headache    ,   Past Surgical History:   Procedure Laterality Date    APPENDECTOMY      BREAST BIOPSY Bilateral     benign     SECTION      x2    DIAGNOSTIC LAPAROSCOPY N/A 10/24/2024    Procedure: DIAGNOSTIC LAPAROSCOPY WITH DAVINCI ROBOT;  Surgeon: Sami Villalobos MD;  Location: [facility] OR;  Service: Obstetrics/Gynecology;  Laterality: N/A;    DIAGNOSTIC LAPAROSCOPY N/A 10/24/2024    Procedure: DIAGNOSTIC LAPAROSCOPY WITH DAVINCI ROBOT;  Surgeon: Goran Rdz MD;  Location: [facility] OR;  Service: General;  Laterality: N/A;    ECTOPIC PREGNANCY      TOTAL LAPAROSCOPIC HYSTERECTOMY SALPINGO OOPHORECTOMY Bilateral 10/21/2024    Procedure: TOTAL LAPAROSCOPIC HYSTERECTOMY BILATERAL SALPINGOOPHORECTOMY WITH  DAVINCI ROBOT;  Surgeon: Kathy Marie MD;  Location: [facility];  Service: Robotics - DaVinci;  Laterality: Bilateral;    TUBAL ABDOMINAL LIGATION      WISDOM TOOTH EXTRACTION     ,   Family History   Problem Relation Age of Onset    Kidney cancer Maternal Aunt     Hypertension Father     No Known Problems Mother     No Known Problems Sister     Leukemia Paternal Grandmother     Breast cancer Paternal Aunt 40    Ovarian cancer Neg Hx     Uterine cancer Neg Hx     Colon cancer Neg Hx     Melanoma Neg Hx     Prostate cancer Neg Hx    ,   Social History     Tobacco Use    Smoking status: Some Days     Types: Cigarettes     Passive exposure: Current    Smokeless tobacco: Never   Vaping Use    Vaping status: Never Used   Substance Use Topics    Alcohol use: Never    Drug use: Never   ,   Medications Prior to Admission   Medication Sig Dispense Refill Last Dose/Taking    acetaminophen (TYLENOL) 500 MG tablet Take 1 tablet by mouth Every 6 (Six) Hours As Needed for Mild Pain for up to 20 days. 60 tablet 0     naloxone (NARCAN) 4 MG/0.1ML nasal spray Call 911. Don't prime. Pinson in 1 nostril for overdose. Repeat in 2-3 minutes in other nostril if no or minimal breathing/responsiveness. (Patient not taking: Reported on 10/30/2024) 2 each 0     ondansetron ODT (ZOFRAN-ODT) 4 MG disintegrating tablet Place 1 tablet on the tongue Every 8 (Eight) Hours As Needed for Nausea or Vomiting. 30 tablet 1     [] oxyCODONE (ROXICODONE) 5 MG immediate release tablet Take 1 tablet by mouth Every 8 (Eight) Hours As Needed for Moderate Pain for up to 7 days. 20 tablet 0     potassium chloride (KLOR-CON M20) 20 MEQ CR tablet Take 1 tablet by mouth 2 (Two) Times a Day. (Patient not taking: Reported on 10/30/2024) 6 tablet 0     and Allergies:  Ketorolac tromethamine and Tramadol    Current Facility-Administered Medications:     acetaminophen (TYLENOL) tablet 650 mg, 650 mg, Oral, Q4H PRN, Lance Mcclain DO, 650 mg at 24  1224    docusate sodium (COLACE) capsule 100 mg, 100 mg, Oral, BID, Kathy Marie MD, 100 mg at 11/04/24 1017    heparin (porcine) 5000 UNIT/ML injection 5,000 Units, 5,000 Units, Subcutaneous, Q12H, Kathy Marie MD, 5,000 Units at 11/04/24 1222    ibuprofen (ADVIL,MOTRIN) tablet 600 mg, 600 mg, Oral, Q6H PRN, Lance Mcclain DO, 600 mg at 11/04/24 0747    ondansetron ODT (ZOFRAN-ODT) disintegrating tablet 4 mg, 4 mg, Oral, Q6H PRN **OR** ondansetron (ZOFRAN) injection 4 mg, 4 mg, Intravenous, Q6H PRN, Lance Mcclain DO    piperacillin-tazobactam (ZOSYN) 3.375 g IVPB in 100 mL NS MBP (CD), 3.375 g, Intravenous, Q8H, Lance Mcclain DO, 3.375 g at 11/04/24 1100    [COMPLETED] Insert Peripheral IV, , , Once **AND** sodium chloride 0.9 % flush 10 mL, 10 mL, Intravenous, PRN, Krystyna Sánchez APRN    sodium chloride 0.9 % flush 10 mL, 10 mL, Intravenous, Q12H, Lance Mcclain DO    sodium chloride 0.9 % infusion 40 mL, 40 mL, Intravenous, PRN, Lance Mcclain DO    sodium chloride 0.9 % infusion, 125 mL/hr, Intravenous, Continuous, Lance Mcclain, , Last Rate: 125 mL/hr at 11/04/24 1100, 125 mL/hr at 11/04/24 1100    Objective     Vital Signs   Temp:  [98 °F (36.7 °C)-99.2 °F (37.3 °C)] 98 °F (36.7 °C)  Heart Rate:  [] 90  Resp:  [16-18] 18  BP: ()/(61-96) 113/62    Physical Exam:    Physical Exam      Physical Exam  Cardiovascular:      Rate and Rhythm: Normal rate and regular rhythm.   Pulmonary:      Effort: Pulmonary effort is normal.   Abdominal:      General: Abdomen is flat. Bowel sounds are normal.      Palpations: Abdomen is soft.      Comments: Post operative incisions present, healing surgical wounds c/o signs or symptoms of infection. Non tender with palpation    Neurological:      Mental Status: She is alert.          Results Review:    Results      Lab Results (last 24 hours)       Procedure Component Value Units Date/Time    CBC & Differential [[id number]]   (Abnormal) Collected: 11/04/24 0746    Specimen: Blood Updated: 11/04/24 0752    Narrative:      The following orders were created for panel order CBC & Differential.  Procedure                               Abnormality         Status                     ---------                               -----------         ------                     CBC Auto Differential[[id number]]        Abnormal            Final result                 Please view results for these tests on the individual orders.    CBC Auto Differential [[id number]]  (Abnormal) Collected: 11/04/24 0746    Specimen: Blood Updated: 11/04/24 0752     WBC 13.60 10*3/mm3      RBC 3.57 10*6/mm3      Hemoglobin 8.1 g/dL      Hematocrit 27.2 %      MCV 76.2 fL      MCH 22.7 pg      MCHC 29.8 g/dL      RDW 17.9 %      RDW-SD 49.4 fl      MPV 10.2 fL      Platelets 503 10*3/mm3      Neutrophil % 78.9 %      Lymphocyte % 13.9 %      Monocyte % 5.3 %      Eosinophil % 0.7 %      Basophil % 0.4 %      Immature Grans % 0.8 %      Neutrophils, Absolute 10.72 10*3/mm3      Lymphocytes, Absolute 1.89 10*3/mm3      Monocytes, Absolute 0.72 10*3/mm3      Eosinophils, Absolute 0.10 10*3/mm3      Basophils, Absolute 0.06 10*3/mm3      Immature Grans, Absolute 0.11 10*3/mm3      nRBC 0.0 /100 WBC     Respiratory Panel PCR w/COVID-19(SARS-CoV-2) CLARISA/CHARLEE/DALJIT/PAD/COR/ALLY In-House, NP Swab in UTM/VTM, 2 HR TAT - Swab, Nasopharynx [[id number]]  (Normal) Collected: 11/03/24 1952    Specimen: Swab from Nasopharynx Updated: 11/03/24 2057     ADENOVIRUS, PCR Not Detected     Coronavirus 229E Not Detected     Coronavirus HKU1 Not Detected     Coronavirus NL63 Not Detected     Coronavirus OC43 Not Detected     COVID19 Not Detected     Human Metapneumovirus Not Detected     Human Rhinovirus/Enterovirus Not Detected     Influenza A PCR Not Detected     Influenza B PCR Not Detected     Parainfluenza Virus 1 Not Detected     Parainfluenza Virus 2 Not Detected     Parainfluenza Virus 3 Not  Detected     Parainfluenza Virus 4 Not Detected     RSV, PCR Not Detected     Bordetella pertussis pcr Not Detected     Bordetella parapertussis PCR Not Detected     Chlamydophila pneumoniae PCR Not Detected     Mycoplasma pneumo by PCR Not Detected    Narrative:      In the setting of a positive respiratory panel with a viral infection PLUS a negative procalcitonin without other underlying concern for bacterial infection, consider observing off antibiotics or discontinuation of antibiotics and continue supportive care. If the respiratory panel is positive for atypical bacterial infection (Bordetella pertussis, Chlamydophila pneumoniae, or Mycoplasma pneumoniae), consider antibiotic de-escalation to target atypical bacterial infection.    CBC & Differential [[id number]]  (Abnormal) Collected: 11/03/24 2043    Specimen: Blood Updated: 11/03/24 2050    Narrative:      The following orders were created for panel order CBC & Differential.  Procedure                               Abnormality         Status                     ---------                               -----------         ------                     CBC Auto Differential[[id number]]        Abnormal            Final result                 Please view results for these tests on the individual orders.    CBC Auto Differential [[id number]]  (Abnormal) Collected: 11/03/24 2043    Specimen: Blood Updated: 11/03/24 2050     WBC 18.72 10*3/mm3      RBC 3.93 10*6/mm3      Hemoglobin 9.1 g/dL      Hematocrit 29.5 %      MCV 75.1 fL      MCH 23.2 pg      MCHC 30.8 g/dL      RDW 18.0 %      RDW-SD 48.7 fl      MPV 10.5 fL      Platelets 633 10*3/mm3      Neutrophil % 80.6 %      Lymphocyte % 12.7 %      Monocyte % 4.6 %      Eosinophil % 0.2 %      Basophil % 0.4 %      Immature Grans % 1.5 %      Neutrophils, Absolute 15.09 10*3/mm3      Lymphocytes, Absolute 2.38 10*3/mm3      Monocytes, Absolute 0.87 10*3/mm3      Eosinophils, Absolute 0.03 10*3/mm3      Basophils,  "Absolute 0.07 10*3/mm3      Immature Grans, Absolute 0.28 10*3/mm3      nRBC 0.0 /100 WBC     Blood Culture - Blood, Arm, Right [[id number]] Collected: 11/03/24 2039    Specimen: Blood from Arm, Right Updated: 11/03/24 2049    Procalcitonin [[id number]]  (Normal) Collected: 11/03/24 1943    Specimen: Blood Updated: 11/03/24 2032     Procalcitonin 0.08 ng/mL     Narrative:      As a Marker for Sepsis (Non-Neonates):    1. <0.5 ng/mL represents a low risk of severe sepsis and/or septic shock.  2. >2 ng/mL represents a high risk of severe sepsis and/or septic shock.    As a Marker for Lower Respiratory Tract Infections that require antibiotic therapy:    PCT on Admission    Antibiotic Therapy       6-12 Hrs later    >0.5                Strongly Recommended  >0.25 - <0.5        Recommended   0.1 - 0.25          Discouraged              Remeasure/reassess PCT  <0.1                Strongly Discouraged     Remeasure/reassess PCT    As 28 day mortality risk marker: \"Change in Procalcitonin Result\" (>80% or <=80%) if Day 0 (or Day 1) and Day 4 values are available. Refer to [email or web address] [email or web address]    Change in PCT <=80%  A decrease of PCT levels below or equal to 80% defines a positive change in PCT test result representing a higher risk for 28-day all-cause mortality of patients diagnosed with severe sepsis for septic shock.    Change in PCT >80%  A decrease of PCT levels of more than 80% defines a negative change in PCT result representing a lower risk for 28-day all-cause mortality of patients diagnosed with severe sepsis or septic shock.       C-reactive Protein [[id number]]  (Abnormal) Collected: 11/03/24 1943    Specimen: Blood Updated: 11/03/24 2028     C-Reactive Protein 5.54 mg/dL     Comprehensive Metabolic Panel [[id number]]  (Abnormal) Collected: 11/03/24 1943    Specimen: Blood Updated: 11/03/24 2026     Glucose 113 mg/dL      BUN 12 mg/dL      Creatinine 0.62 mg/dL      Sodium 139 mmol/L      Potassium " 4.1 mmol/L      Chloride 104 mmol/L      CO2 25.0 mmol/L      Calcium 8.6 mg/dL      Total Protein 7.5 g/dL      Albumin 3.9 g/dL      ALT (SGPT) 32 U/L      AST (SGOT) 18 U/L      Alkaline Phosphatase 70 U/L      Total Bilirubin 0.2 mg/dL      Globulin 3.6 gm/dL      A/G Ratio 1.1 g/dL      BUN/Creatinine Ratio 19.4     Anion Gap 10.0 mmol/L      eGFR 111.4 mL/min/1.73     Narrative:      GFR Normal >60  Chronic Kidney Disease <60  Kidney Failure <15      Urinalysis With Culture If Indicated - Urine, Clean Catch [[id number]]  (Normal) Collected: 11/03/24 2010    Specimen: Urine, Clean Catch Updated: 11/03/24 2026     Color, UA Yellow     Appearance, UA Clear     pH, UA 6.0     Specific Gravity, UA 1.022     Glucose, UA Negative     Ketones, UA Negative     Bilirubin, UA Negative     Blood, UA Negative     Protein, UA Negative     Leuk Esterase, UA Negative     Nitrite, UA Negative     Urobilinogen, UA 0.2 E.U./dL    Narrative:      In absence of clinical symptoms, the presence of pyuria, bacteria, and/or nitrites on the urinalysis result does not correlate with infection.  Urine microscopic not indicated.    Lipase [[id number]]  (Normal) Collected: 11/03/24 1943    Specimen: Blood Updated: 11/03/24 2021     Lipase 28 U/L     Lactic Acid, Plasma [[id number]]  (Normal) Collected: 11/03/24 1943    Specimen: Blood Updated: 11/03/24 2020     Lactate 1.2 mmol/L     Blood Culture - Blood, Arm, Left [[id number]] Collected: 11/03/24 1943    Specimen: Blood from Arm, Left Updated: 11/03/24 2019    D-dimer, Quantitative [[id number]]  (Abnormal) Collected: 11/03/24 1943    Specimen: Blood Updated: 11/03/24 2012     D-Dimer, Quantitative 2.75 MCGFEU/mL     Narrative:      According to the assay 's published package insert, a normal (<0.50 MCGFEU/mL) D-dimer result in conjunction with a non-high clinical probability assessment, excludes deep vein thrombosis (DVT) and pulmonary embolism (PE) with high  "sensitivity.    D-dimer values increase with age and this can make VTE exclusion of an older population difficult. To address this, the [employer], based on best available evidence and recent guidelines, recommends that clinicians use age-adjusted D-dimer thresholds in patients greater than 50 years of age with: a) a low probability of PE who do not meet all Pulmonary Embolism Rule Out Criteria, or b) in those with intermediate probability of PE.   The formula for an age-adjusted D-dimer cut-off is \"age/100\".  For example, a 60 year old patient would have an age-adjusted cut-off of 0.60 MCGFEU/mL and an 80 year old 0.80 MCGFEU/mL.    Sedimentation Rate [[id number]]  (Abnormal) Collected: 11/03/24 1943    Specimen: Blood Updated: 11/03/24 2010     Sed Rate 116 mm/hr           Imaging Results (Last 24 Hours)       Procedure Component Value Units Date/Time    CT Angiogram Chest [[id number]] Collected: 11/03/24 2058     Updated: 11/03/24 2106    Narrative:      Exam: CT angiogram of the of the chest with intravenous contrast.  11/3/2024     CLINICAL HISTORY: Recent surgery. Right-sided pleuritic chest pain.  Elevated D-dimer.     DOSE:  304.51 mGy.cm . All CT scans are performed using dose  optimization techniques as appropriate to the performed exam and  including at least one of the following: Automated exposure control,  adjustment of the mA and/or kV according to size, and the use of the  iterative reconstruction technique.     TECHNIQUE: Contiguous axial images were obtained through the thorax  following intravenous contrast administration with reformatted images  obtained in the sagittal and coronal projections from the original data  set. Three-dimensional reconstructions are also obtained.     COMPARISON: None     FINDINGS:     Pulmonary arteries:  There is normal enhancement of the pulmonary  arteries with no evidence of pulmonary thromboembolic disease.     Aorta:  The thoracic aorta and " proximal great vessels are normal in  appearance. There is no evidence of aortic dissection or aneurysm.     LUNGS: Biapical fibronodular changes are present. These are symmetric.  The lungs are otherwise clear. No consolidative pneumonia or effusion.     Pleural spaces: Unremarkable. No evidence of pleural effusion or  pneumothorax.     HEART: No evidence of enlargement. No coronary artery calcifications are  present. There is no evidence of pericardial effusion. No evidence of RV  dysfunction.     Bones: No acute osseous abnormalities are demonstrated.     CHEST WALL: There is asymmetry within the left breast which measures  fluid density. I would favor a benign cyst within the left breast. This  was confirmed on previous diagnostic mammography and ultrasound in 2022.  No additional chest wall abnormalities present.     Lymph nodes: No enlarged mediastinal or axillary lymphadenopathy is  present.     Upper abdomen: Limited images of the upper abdomen are unremarkable.       Impression:      1. No evidence of pulmonary thromboembolic disease. The thoracic aorta  is normal in caliber with no evidence of dissection or aneurysm. The  lungs are clear.  2. Left breast cyst.     This report was signed and finalized on 11/3/2024 9:03 PM by Dr. Tera Ward MD.       CT Abdomen Pelvis With Contrast [[id number]] Collected: 11/03/24 2009     Updated: 11/03/24 2024    Narrative:      CT ABDOMEN PELVIS W CONTRAST- 11/3/2024 6:57 PM     HISTORY: recent hysterectomy with postoperative abscess       COMPARISON: Outside CT dated 10/24/2024.     DLP: 271.98 mGy.cm. All CT scans are performed using dose optimization  techniques as appropriate to the performed exam and including at least  one of the following: Automated exposure control, adjustment of the mA  and/or kV according to size, and the use of the iterative reconstruction  technique.     TECHNIQUE: Following the intravenous administration of contrast, helical  CT  tomographic images of the abdomen and pelvis were acquired. Coronal  reformatted images were also provided for review.     FINDINGS:  The lung bases and base of the heart are unremarkable.     LIVER: No focal liver lesion. The hepatic vasculature is patent.     BILIARY SYSTEM: The gallbladder is unremarkable. No intrahepatic or  extrahepatic ductal dilatation.     PANCREAS: No focal pancreatic lesion.     SPLEEN: Unremarkable.     KIDNEYS AND ADRENALS: Bilateral kidneys and adrenal glands are  unremarkable. The ureters are decompressed and normal in appearance.     RETROPERITONEUM: No mass, lymphadenopathy or hemorrhage.     GI TRACT: There is constipation with increased stool within the right  and transverse colon. The small bowel is normal in distribution and  appearance. No evidence of mechanical obstruction. No gastric wall  thickening or gastric outlet obstruction.. The appendix is surgically  absent..     OTHER: No mesenteric mass is demonstrated. No evidence of adenopathy in  the small bowel mesentery.. 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 This is new from the previous examination. The patient has  undergone recent hysterectomy.. The osseous structures and soft tissues  demonstrate no worrisome lesions. A fat-containing periumbilical hernia  is present.     PELVIS: The patient has undergone hysterectomy. There is a small volume  of air within the urinary bladder. There is mild stranding in the  perivesical space. At the apex of the vaginal cuff there is a fluid  collection containing some air which measures approximately 6.4 x 4.4 cm  in size consistent with a postoperative abscess. This shows significant  interval improvement from the previous examination of 10/24/2024 at  which time this collection measured 11.0 x 6.7 cm in size. There is   residual inflammatory stranding within the pelvic fat.. No significant  bladder wall thickening is present..       Impression:      1. The patient has  undergone recent hysterectomy. There is a  postoperative abscess noted at the level of the apex of the vaginal cuff  containing some air. This shows interval improvement from the previous  examination of 10/24/2024. There is  persistent inflammatory stranding  within the pelvis. There is also air within the urinary bladder with  mild enhancement of the bladder wall and stranding in the perivesical  space. Correlation is recommended with the patient's urinalysis. No free  fluid in the pelvis.  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  3. No evidence of nephrolithiasis or obstructive uropathy. No free  flowing effusion present within the abdomen or pelvis.  4. Constipation with increased stool within the right and transverse  colon. No mechanical bowel obstruction..           This report was signed and finalized on 11/3/2024 8:21 PM by Dr. Tera Ward MD.       XR Chest 1 View [[id number]] Collected: 11/03/24 1931     Updated: 11/03/24 1934    Narrative:      EXAMINATION: XR CHEST 1 VW-  11/3/2024 7:31 PM     Comparison: None available     HISTORY: Fever     One-view chest: Upright frontal projection of the chest is obtained. The  lungs are clear with no evidence of acute parenchymal  consolidation. No  pleural effusion or free air is observed. The  mediastinal contours are  within normal limits.                                                                                                                         Impression:      Impression: No evidence of acute cardiopulmonary disease.     This report was signed and finalized on 11/3/2024 7:31 PM by Dr. Tera Ward MD.               ASSESSMENT/PLAN  Anaerobic Culture - Body Fluid, Pelvis (10/24/2024 23:27)   Assessment & Plan       Diagnosis Plan   1. Postoperative abscess        2. Fever, unspecified fever cause        3. History of hysterectomy             Postoperative abscess    Dr. Rdz and I both saw patient this AM. Leukocytosis is  improving. Advised to continue to slowly drink fluids and advance diet. If she tolerates clears, nausea improved and WBCs trend downward tomorrow the plan will be to d/c home. Patient's symptoms have improved since yesterday. Culture from recent surgery reviewed with patient. Continue IV antibiotics and will plan to transition to oral antibiotics prior to d/c home.     Susan Balbuena, SERGE  11/04/24  14:06 CST

## 2024-11-04 NOTE — CONSULTS
"General Surgery     Referring Provider: No ref. provider found    Patient Care Team:  Rashawn Mcgovern MD as PCP - General (Family Medicine)    Subjective      Chief complaint: Fever and abdominal pain    History of present illness:    Aubree Alegria yesterday was admitted due to complaints of fevers and abdominal pain.  She is status post laparoscopic procedure with evacuation/washout of pelvic abscess.  She stated she had been doing well however 2 days ago she began running a fever and feeling \"ill\".  She was admitted due to findings of a smaller cuff abscess as well as leukocytosis.  Today the patient states she is feeling much better.  She denies abdominal pain or fevers or chills.  She has been placed on Zosyn since yesterday.    Review of Systems:  Review of Systems - General ROS: positive for  - fever  Respiratory ROS: no cough, shortness of breath, or wheezing  Cardiovascular ROS: no chest pain or dyspnea on exertion  Gastrointestinal ROS: positive for - abdominal pain and constipation     History  Past Medical History:   Diagnosis Date    Spinal headache    ,   Past Surgical History:   Procedure Laterality Date    APPENDECTOMY      BREAST BIOPSY Bilateral     benign     SECTION      x2    DIAGNOSTIC LAPAROSCOPY N/A 10/24/2024    Procedure: DIAGNOSTIC LAPAROSCOPY WITH DAVINCI ROBOT;  Surgeon: Sami Villalobos MD;  Location: [facility] OR;  Service: Obstetrics/Gynecology;  Laterality: N/A;    DIAGNOSTIC LAPAROSCOPY N/A 10/24/2024    Procedure: DIAGNOSTIC LAPAROSCOPY WITH DAVINCI ROBOT;  Surgeon: Goran Rdz MD;  Location: [facility] OR;  Service: General;  Laterality: N/A;    ECTOPIC PREGNANCY      TOTAL LAPAROSCOPIC HYSTERECTOMY SALPINGO OOPHORECTOMY Bilateral 10/21/2024    Procedure: TOTAL LAPAROSCOPIC HYSTERECTOMY BILATERAL SALPINGOOPHORECTOMY WITH DAVINCI ROBOT;  Surgeon: Kathy Marie MD;  Location: [facility] OR;  Service: Robotics - DaVinci;  Laterality: Bilateral;    TUBAL ABDOMINAL LIGATION   "    WISDOM TOOTH EXTRACTION     ,   Family History   Problem Relation Age of Onset    Kidney cancer Maternal Aunt     Hypertension Father     No Known Problems Mother     No Known Problems Sister     Leukemia Paternal Grandmother     Breast cancer Paternal Aunt 40    Ovarian cancer Neg Hx     Uterine cancer Neg Hx     Colon cancer Neg Hx     Melanoma Neg Hx     Prostate cancer Neg Hx    ,   Social History     Tobacco Use    Smoking status: Some Days     Types: Cigarettes     Passive exposure: Current    Smokeless tobacco: Never   Vaping Use    Vaping status: Never Used   Substance Use Topics    Alcohol use: Never    Drug use: Never   ,   Medications Prior to Admission   Medication Sig Dispense Refill Last Dose/Taking    acetaminophen (TYLENOL) 500 MG tablet Take 1 tablet by mouth Every 6 (Six) Hours As Needed for Mild Pain for up to 20 days. 60 tablet 0     naloxone (NARCAN) 4 MG/0.1ML nasal spray Call 911. Don't prime. [place] in 1 nostril for overdose. Repeat in 2-3 minutes in other nostril if no or minimal breathing/responsiveness. (Patient not taking: Reported on 10/30/2024) 2 each 0     ondansetron ODT (ZOFRAN-ODT) 4 MG disintegrating tablet Place 1 tablet on the tongue Every 8 (Eight) Hours As Needed for Nausea or Vomiting. 30 tablet 1     [] oxyCODONE (ROXICODONE) 5 MG immediate release tablet Take 1 tablet by mouth Every 8 (Eight) Hours As Needed for Moderate Pain for up to 7 days. 20 tablet 0     potassium chloride (KLOR-CON M20) 20 MEQ CR tablet Take 1 tablet by mouth 2 (Two) Times a Day. (Patient not taking: Reported on 10/30/2024) 6 tablet 0     and Allergies:  Ketorolac tromethamine and Tramadol    Current Facility-Administered Medications:     acetaminophen (TYLENOL) tablet 650 mg, 650 mg, Oral, Q4H PRN, Lance Mcclain DO, 650 mg at 24 1224    docusate sodium (COLACE) capsule 100 mg, 100 mg, Oral, BID, Kathy Marie MD, 100 mg at 24 1017    heparin (porcine) 5000 UNIT/ML  injection 5,000 Units, 5,000 Units, Subcutaneous, Q12H, Kathy Marie MD, 5,000 Units at 11/04/24 1222    ibuprofen (ADVIL,MOTRIN) tablet 600 mg, 600 mg, Oral, Q6H PRN, Lance Mcclain, , 600 mg at 11/04/24 0747    ondansetron ODT (ZOFRAN-ODT) disintegrating tablet 4 mg, 4 mg, Oral, Q6H PRN **OR** ondansetron (ZOFRAN) injection 4 mg, 4 mg, Intravenous, Q6H PRN, Lance Mcclain,     piperacillin-tazobactam (ZOSYN) 3.375 g IVPB in 100 mL NS MBP (CD), 3.375 g, Intravenous, Q8H, Lance Mcclain, , 3.375 g at 11/04/24 1100    [COMPLETED] Insert Peripheral IV, , , Once **AND** sodium chloride 0.9 % flush 10 mL, 10 mL, Intravenous, PRN, Krystyna Sánchez, SERGE    sodium chloride 0.9 % flush 10 mL, 10 mL, Intravenous, Q12H, Lance Mcclain,     sodium chloride 0.9 % infusion 40 mL, 40 mL, Intravenous, PRN, Lance Mcclain,     sodium chloride 0.9 % infusion, 125 mL/hr, Intravenous, Continuous, Lance Mcclain, , Last Rate: 125 mL/hr at 11/04/24 1100, 125 mL/hr at 11/04/24 1100    Objective     Vital Signs   Temp:  [98 °F (36.7 °C)-99.2 °F (37.3 °C)] 98 °F (36.7 °C)  Heart Rate:  [] 90  Resp:  [16-18] 18  BP: ()/(61-96) 113/62    Physical Exam:  Physical Exam  Constitutional:       General: She is not in acute distress.     Appearance: Normal appearance. She is normal weight. She is not ill-appearing.   Cardiovascular:      Rate and Rhythm: Normal rate and regular rhythm.   Pulmonary:      Effort: Pulmonary effort is normal. No respiratory distress.      Breath sounds: Normal breath sounds. No wheezing.   Abdominal:      General: Bowel sounds are normal.      Palpations: Abdomen is soft.      Tenderness: There is no abdominal tenderness.      Comments: All wound sites are clean dry and intact.   Neurological:      Mental Status: She is alert.          Results Review:    Lab Results (last 24 hours)       Procedure Component Value Units Date/Time    CBC & Differential [[id number]]   (Abnormal) Collected: 11/04/24 0746    Specimen: Blood Updated: 11/04/24 0752    Narrative:      The following orders were created for panel order CBC & Differential.  Procedure                               Abnormality         Status                     ---------                               -----------         ------                     CBC Auto Differential[[id number]]        Abnormal            Final result                 Please view results for these tests on the individual orders.    CBC Auto Differential [[id number]]  (Abnormal) Collected: 11/04/24 0746    Specimen: Blood Updated: 11/04/24 0752     WBC 13.60 10*3/mm3      RBC 3.57 10*6/mm3      Hemoglobin 8.1 g/dL      Hematocrit 27.2 %      MCV 76.2 fL      MCH 22.7 pg      MCHC 29.8 g/dL      RDW 17.9 %      RDW-SD 49.4 fl      MPV 10.2 fL      Platelets 503 10*3/mm3      Neutrophil % 78.9 %      Lymphocyte % 13.9 %      Monocyte % 5.3 %      Eosinophil % 0.7 %      Basophil % 0.4 %      Immature Grans % 0.8 %      Neutrophils, Absolute 10.72 10*3/mm3      Lymphocytes, Absolute 1.89 10*3/mm3      Monocytes, Absolute 0.72 10*3/mm3      Eosinophils, Absolute 0.10 10*3/mm3      Basophils, Absolute 0.06 10*3/mm3      Immature Grans, Absolute 0.11 10*3/mm3      nRBC 0.0 /100 WBC     Respiratory Panel PCR w/COVID-19(SARS-CoV-2) CLARISA/CHARLEE/DALJIT/PAD/COR/ALLY In-House, NP Swab in UTM/VTM, 2 HR TAT - Swab, Nasopharynx [[id number]]  (Normal) Collected: 11/03/24 1952    Specimen: Swab from Nasopharynx Updated: 11/03/24 2057     ADENOVIRUS, PCR Not Detected     Coronavirus 229E Not Detected     Coronavirus HKU1 Not Detected     Coronavirus NL63 Not Detected     Coronavirus OC43 Not Detected     COVID19 Not Detected     Human Metapneumovirus Not Detected     Human Rhinovirus/Enterovirus Not Detected     Influenza A PCR Not Detected     Influenza B PCR Not Detected     Parainfluenza Virus 1 Not Detected     Parainfluenza Virus 2 Not Detected     Parainfluenza Virus 3 Not  Detected     Parainfluenza Virus 4 Not Detected     RSV, PCR Not Detected     Bordetella pertussis pcr Not Detected     Bordetella parapertussis PCR Not Detected     Chlamydophila pneumoniae PCR Not Detected     Mycoplasma pneumo by PCR Not Detected    Narrative:      In the setting of a positive respiratory panel with a viral infection PLUS a negative procalcitonin without other underlying concern for bacterial infection, consider observing off antibiotics or discontinuation of antibiotics and continue supportive care. If the respiratory panel is positive for atypical bacterial infection (Bordetella pertussis, Chlamydophila pneumoniae, or Mycoplasma pneumoniae), consider antibiotic de-escalation to target atypical bacterial infection.    CBC & Differential [[id number]]  (Abnormal) Collected: 11/03/24 2043    Specimen: Blood Updated: 11/03/24 2050    Narrative:      The following orders were created for panel order CBC & Differential.  Procedure                               Abnormality         Status                     ---------                               -----------         ------                     CBC Auto Differential[[id number]]        Abnormal            Final result                 Please view results for these tests on the individual orders.    CBC Auto Differential [[id number]]  (Abnormal) Collected: 11/03/24 2043    Specimen: Blood Updated: 11/03/24 2050     WBC 18.72 10*3/mm3      RBC 3.93 10*6/mm3      Hemoglobin 9.1 g/dL      Hematocrit 29.5 %      MCV 75.1 fL      MCH 23.2 pg      MCHC 30.8 g/dL      RDW 18.0 %      RDW-SD 48.7 fl      MPV 10.5 fL      Platelets 633 10*3/mm3      Neutrophil % 80.6 %      Lymphocyte % 12.7 %      Monocyte % 4.6 %      Eosinophil % 0.2 %      Basophil % 0.4 %      Immature Grans % 1.5 %      Neutrophils, Absolute 15.09 10*3/mm3      Lymphocytes, Absolute 2.38 10*3/mm3      Monocytes, Absolute 0.87 10*3/mm3      Eosinophils, Absolute 0.03 10*3/mm3      Basophils,  "Absolute 0.07 10*3/mm3      Immature Grans, Absolute 0.28 10*3/mm3      nRBC 0.0 /100 WBC     Blood Culture - Blood, Arm, Right [[id number]] Collected: 11/03/24 2039    Specimen: Blood from Arm, Right Updated: 11/03/24 2049    Procalcitonin [[id number]]  (Normal) Collected: 11/03/24 1943    Specimen: Blood Updated: 11/03/24 2032     Procalcitonin 0.08 ng/mL     Narrative:      As a Marker for Sepsis (Non-Neonates):    1. <0.5 ng/mL represents a low risk of severe sepsis and/or septic shock.  2. >2 ng/mL represents a high risk of severe sepsis and/or septic shock.    As a Marker for Lower Respiratory Tract Infections that require antibiotic therapy:    PCT on Admission    Antibiotic Therapy       6-12 Hrs later    >0.5                Strongly Recommended  >0.25 - <0.5        Recommended   0.1 - 0.25          Discouraged              Remeasure/reassess PCT  <0.1                Strongly Discouraged     Remeasure/reassess PCT    As 28 day mortality risk marker: \"Change in Procalcitonin Result\" (>80% or <=80%) if Day 0 (or Day 1) and Day 4 values are available. Refer to http://[email or web address] [email or web address]-[email or web address]-[email or web address]    Change in PCT <=80%  A decrease of PCT levels below or equal to 80% defines a positive change in PCT test result representing a higher risk for 28-day all-cause mortality of patients diagnosed with severe sepsis for septic shock.    Change in PCT >80%  A decrease of PCT levels of more than 80% defines a negative change in PCT result representing a lower risk for 28-day all-cause mortality of patients diagnosed with severe sepsis or septic shock.       C-reactive Protein [[id number]]  (Abnormal) Collected: 11/03/24 1943    Specimen: Blood Updated: 11/03/24 2028     C-Reactive Protein 5.54 mg/dL     Comprehensive Metabolic Panel [[id number]]  (Abnormal) Collected: 11/03/24 1943    Specimen: Blood Updated: 11/03/24 2026     Glucose 113 mg/dL      BUN 12 mg/dL      Creatinine 0.62 mg/dL      Sodium 139 mmol/L      Potassium " 4.1 mmol/L      Chloride 104 mmol/L      CO2 25.0 mmol/L      Calcium 8.6 mg/dL      Total Protein 7.5 g/dL      Albumin 3.9 g/dL      ALT (SGPT) 32 U/L      AST (SGOT) 18 U/L      Alkaline Phosphatase 70 U/L      Total Bilirubin 0.2 mg/dL      Globulin 3.6 gm/dL      A/G Ratio 1.1 g/dL      BUN/Creatinine Ratio 19.4     Anion Gap 10.0 mmol/L      eGFR 111.4 mL/min/1.73     Narrative:      GFR Normal >60  Chronic Kidney Disease <60  Kidney Failure <15      Urinalysis With Culture If Indicated - Urine, Clean Catch [[id number]]  (Normal) Collected: 11/03/24 2010    Specimen: Urine, Clean Catch Updated: 11/03/24 2026     Color, UA Yellow     Appearance, UA Clear     pH, UA 6.0     Specific Gravity, UA 1.022     Glucose, UA Negative     Ketones, UA Negative     Bilirubin, UA Negative     Blood, UA Negative     Protein, UA Negative     Leuk Esterase, UA Negative     Nitrite, UA Negative     Urobilinogen, UA 0.2 E.U./dL    Narrative:      In absence of clinical symptoms, the presence of pyuria, bacteria, and/or nitrites on the urinalysis result does not correlate with infection.  Urine microscopic not indicated.    Lipase [[id number]]  (Normal) Collected: 11/03/24 1943    Specimen: Blood Updated: 11/03/24 2021     Lipase 28 U/L     Lactic Acid, Plasma [[id number]]  (Normal) Collected: 11/03/24 1943    Specimen: Blood Updated: 11/03/24 2020     Lactate 1.2 mmol/L     Blood Culture - Blood, Arm, Left [[id number]] Collected: 11/03/24 1943    Specimen: Blood from Arm, Left Updated: 11/03/24 2019    D-dimer, Quantitative [[id number]]  (Abnormal) Collected: 11/03/24 1943    Specimen: Blood Updated: 11/03/24 2012     D-Dimer, Quantitative 2.75 MCGFEU/mL     Narrative:      According to the assay 's published package insert, a normal (<0.50 MCGFEU/mL) D-dimer result in conjunction with a non-high clinical probability assessment, excludes deep vein thrombosis (DVT) and pulmonary embolism (PE) with high  "sensitivity.    D-dimer values increase with age and this can make VTE exclusion of an older population difficult. To address this, the [employer], based on best available evidence and recent guidelines, recommends that clinicians use age-adjusted D-dimer thresholds in patients greater than 50 years of age with: a) a low probability of PE who do not meet all Pulmonary Embolism Rule Out Criteria, or b) in those with intermediate probability of PE.   The formula for an age-adjusted D-dimer cut-off is \"age/100\".  For example, a 60 year old patient would have an age-adjusted cut-off of 0.60 MCGFEU/mL and an 80 year old 0.80 MCGFEU/mL.    Sedimentation Rate [[id number]]  (Abnormal) Collected: 11/03/24 1943    Specimen: Blood Updated: 11/03/24 2010     Sed Rate 116 mm/hr           Imaging Results (Last 24 Hours)       Procedure Component Value Units Date/Time    CT Angiogram Chest [[id number]] Collected: 11/03/24 2058     Updated: 11/03/24 2106    Narrative:      Exam: CT angiogram of the of the chest with intravenous contrast.  11/3/2024     CLINICAL HISTORY: Recent surgery. Right-sided pleuritic chest pain.  Elevated D-dimer.     DOSE:  304.51 mGy.cm . All CT scans are performed using dose  optimization techniques as appropriate to the performed exam and  including at least one of the following: Automated exposure control,  adjustment of the mA and/or kV according to size, and the use of the  iterative reconstruction technique.     TECHNIQUE: Contiguous axial images were obtained through the thorax  following intravenous contrast administration with reformatted images  obtained in the sagittal and coronal projections from the original data  set. Three-dimensional reconstructions are also obtained.     COMPARISON: None     FINDINGS:     Pulmonary arteries:  There is normal enhancement of the pulmonary  arteries with no evidence of pulmonary thromboembolic disease.     Aorta:  The thoracic aorta and " proximal great vessels are normal in  appearance. There is no evidence of aortic dissection or aneurysm.     LUNGS: Biapical fibronodular changes are present. These are symmetric.  The lungs are otherwise clear. No consolidative pneumonia or effusion.     Pleural spaces: Unremarkable. No evidence of pleural effusion or  pneumothorax.     HEART: No evidence of enlargement. No coronary artery calcifications are  present. There is no evidence of pericardial effusion. No evidence of RV  dysfunction.     Bones: No acute osseous abnormalities are demonstrated.     CHEST WALL: There is asymmetry within the left breast which measures  fluid density. I would favor a benign cyst within the left breast. This  was confirmed on previous diagnostic mammography and ultrasound in 2022.  No additional chest wall abnormalities present.     Lymph nodes: No enlarged mediastinal or axillary lymphadenopathy is  present.     Upper abdomen: Limited images of the upper abdomen are unremarkable.       Impression:      1. No evidence of pulmonary thromboembolic disease. The thoracic aorta  is normal in caliber with no evidence of dissection or aneurysm. The  lungs are clear.  2. Left breast cyst.     This report was signed and finalized on 11/3/2024 9:03 PM by Dr. Tera Ward MD.       CT Abdomen Pelvis With Contrast [[id number]] Collected: 11/03/24 2009     Updated: 11/03/24 2024    Narrative:      CT ABDOMEN PELVIS W CONTRAST- 11/3/2024 6:57 PM     HISTORY: recent hysterectomy with postoperative abscess       COMPARISON: Outside CT dated 10/24/2024.     DLP: 271.98 mGy.cm. All CT scans are performed using dose optimization  techniques as appropriate to the performed exam and including at least  one of the following: Automated exposure control, adjustment of the mA  and/or kV according to size, and the use of the iterative reconstruction  technique.     TECHNIQUE: Following the intravenous administration of contrast, helical  CT  tomographic images of the abdomen and pelvis were acquired. Coronal  reformatted images were also provided for review.     FINDINGS:  The lung bases and base of the heart are unremarkable.     LIVER: No focal liver lesion. The hepatic vasculature is patent.     BILIARY SYSTEM: The gallbladder is unremarkable. No intrahepatic or  extrahepatic ductal dilatation.     PANCREAS: No focal pancreatic lesion.     SPLEEN: Unremarkable.     KIDNEYS AND ADRENALS: Bilateral kidneys and adrenal glands are  unremarkable. The ureters are decompressed and normal in appearance.     RETROPERITONEUM: No mass, lymphadenopathy or hemorrhage.     GI TRACT: There is constipation with increased stool within the right  and transverse colon. The small bowel is normal in distribution and  appearance. No evidence of mechanical obstruction. No gastric wall  thickening or gastric outlet obstruction.. The appendix is surgically  absent..     OTHER: No mesenteric mass is demonstrated. No evidence of adenopathy in  the small bowel mesentery.. 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 This is new from the previous examination. The patient has  undergone recent hysterectomy.. The osseous structures and soft tissues  demonstrate no worrisome lesions. A fat-containing periumbilical hernia  is present.     PELVIS: The patient has undergone hysterectomy. There is a small volume  of air within the urinary bladder. There is mild stranding in the  perivesical space. At the apex of the vaginal cuff there is a fluid  collection containing some air which measures approximately 6.4 x 4.4 cm  in size consistent with a postoperative abscess. This shows significant  interval improvement from the previous examination of 10/24/2024 at  which time this collection measured 11.0 x 6.7 cm in size. There is   residual inflammatory stranding within the pelvic fat.. No significant  bladder wall thickening is present..       Impression:      1. The patient has  undergone recent hysterectomy. There is a  postoperative abscess noted at the level of the apex of the vaginal cuff  containing some air. This shows interval improvement from the previous  examination of 10/24/2024. There is  persistent inflammatory stranding  within the pelvis. There is also air within the urinary bladder with  mild enhancement of the bladder wall and stranding in the perivesical  space. Correlation is recommended with the patient's urinalysis. No free  fluid in the pelvis.  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  3. No evidence of nephrolithiasis or obstructive uropathy. No free  flowing effusion present within the abdomen or pelvis.  4. Constipation with increased stool within the right and transverse  colon. No mechanical bowel obstruction..           This report was signed and finalized on 11/3/2024 8:21 PM by Dr. Tera Ward MD.       XR Chest 1 View [[id number]] Collected: 11/03/24 1931     Updated: 11/03/24 1934    Narrative:      EXAMINATION: XR CHEST 1 VW-  11/3/2024 7:31 PM     Comparison: None available     HISTORY: Fever     One-view chest: Upright frontal projection of the chest is obtained. The  lungs are clear with no evidence of acute parenchymal  consolidation. No  pleural effusion or free air is observed. The  mediastinal contours are  within normal limits.                                                                                                                         Impression:      Impression: No evidence of acute cardiopulmonary disease.     This report was signed and finalized on 11/3/2024 7:31 PM by Dr. Tera Ward MD.                 ASSESSMENT/PLAN   Diagnosis Plan   1. Postoperative abscess        2. Fever, unspecified fever cause        3. History of hysterectomy           Plan: Current treatment is appropriate.  Patient is on IV antibiotics and may consider changing it to oral antibiotics due to her leukocytosis improving.  I would recommend  assessing the trend of her leukocytosis with a.m. labs tomorrow.  No issues with her GI tract for she is tolerating p.o. and having bowel movements.  With respect to the postoperative abscess CT has shown improvement of size of this abscess.  I did explain to the patient that there is a possibility the fluid collection may increase in size and if so if becoming symptomatic with fevers, chills, increased abdominal pain this fluid collection may actually require drainage with interventional radiology assistance if amenable to this approach.  Currently she is doing quite well and progressing appropriately with her current treatment regimen.  And I do agree there is no surgical intervention necessary at this time.  I will reassess her in a.m. looking at these parameters discussed above.  I discussed with the patient her cultures and sensitivities which disclosed she was on the appropriate antibiotics for her current treatment.  Her IV Zosyn will also treat this form of bacteria.  Discussed plan with patient and family and addressed their questions and concerns to their satisfaction at this juncture.      Goran Rdz MD  11/04/24  13:56 CST

## 2024-11-05 LAB
ALBUMIN SERPL-MCNC: 3 G/DL (ref 3.5–5.2)
ALBUMIN/GLOB SERPL: 1 G/DL
ALP SERPL-CCNC: 56 U/L (ref 39–117)
ALT SERPL W P-5'-P-CCNC: 20 U/L (ref 1–33)
ANION GAP SERPL CALCULATED.3IONS-SCNC: 7 MMOL/L (ref 5–15)
AST SERPL-CCNC: 13 U/L (ref 1–32)
BASOPHILS # BLD AUTO: 0.06 10*3/MM3 (ref 0–0.2)
BASOPHILS # BLD AUTO: 0.08 10*3/MM3 (ref 0–0.2)
BASOPHILS NFR BLD AUTO: 0.4 % (ref 0–1.5)
BASOPHILS NFR BLD AUTO: 0.6 % (ref 0–1.5)
BILIRUB SERPL-MCNC: 0.2 MG/DL (ref 0–1.2)
BUN SERPL-MCNC: 7 MG/DL (ref 6–20)
BUN/CREAT SERPL: 10.9 (ref 7–25)
CALCIUM SPEC-SCNC: 7.9 MG/DL (ref 8.6–10.5)
CHLORIDE SERPL-SCNC: 109 MMOL/L (ref 98–107)
CO2 SERPL-SCNC: 24 MMOL/L (ref 22–29)
CREAT SERPL-MCNC: 0.64 MG/DL (ref 0.57–1)
DEPRECATED RDW RBC AUTO: 49.1 FL (ref 37–54)
DEPRECATED RDW RBC AUTO: 50 FL (ref 37–54)
EGFRCR SERPLBLD CKD-EPI 2021: 110.5 ML/MIN/1.73
EOSINOPHIL # BLD AUTO: 0.17 10*3/MM3 (ref 0–0.4)
EOSINOPHIL # BLD AUTO: 0.21 10*3/MM3 (ref 0–0.4)
EOSINOPHIL NFR BLD AUTO: 1.3 % (ref 0.3–6.2)
EOSINOPHIL NFR BLD AUTO: 1.5 % (ref 0.3–6.2)
ERYTHROCYTE [DISTWIDTH] IN BLOOD BY AUTOMATED COUNT: 18 % (ref 12.3–15.4)
ERYTHROCYTE [DISTWIDTH] IN BLOOD BY AUTOMATED COUNT: 18.3 % (ref 12.3–15.4)
GLOBULIN UR ELPH-MCNC: 3 GM/DL
GLUCOSE SERPL-MCNC: 99 MG/DL (ref 65–99)
HCT VFR BLD AUTO: 24.7 % (ref 34–46.6)
HCT VFR BLD AUTO: 26.6 % (ref 34–46.6)
HGB BLD-MCNC: 7.5 G/DL (ref 12–15.9)
HGB BLD-MCNC: 7.9 G/DL (ref 12–15.9)
IMM GRANULOCYTES # BLD AUTO: 0.08 10*3/MM3 (ref 0–0.05)
IMM GRANULOCYTES # BLD AUTO: 0.19 10*3/MM3 (ref 0–0.05)
IMM GRANULOCYTES NFR BLD AUTO: 0.6 % (ref 0–0.5)
IMM GRANULOCYTES NFR BLD AUTO: 1.4 % (ref 0–0.5)
LYMPHOCYTES # BLD AUTO: 1.81 10*3/MM3 (ref 0.7–3.1)
LYMPHOCYTES # BLD AUTO: 2.06 10*3/MM3 (ref 0.7–3.1)
LYMPHOCYTES NFR BLD AUTO: 13.5 % (ref 19.6–45.3)
LYMPHOCYTES NFR BLD AUTO: 14.7 % (ref 19.6–45.3)
MCH RBC QN AUTO: 22.6 PG (ref 26.6–33)
MCH RBC QN AUTO: 22.9 PG (ref 26.6–33)
MCHC RBC AUTO-ENTMCNC: 29.7 G/DL (ref 31.5–35.7)
MCHC RBC AUTO-ENTMCNC: 30.4 G/DL (ref 31.5–35.7)
MCV RBC AUTO: 75.5 FL (ref 79–97)
MCV RBC AUTO: 76 FL (ref 79–97)
MONOCYTES # BLD AUTO: 0.72 10*3/MM3 (ref 0.1–0.9)
MONOCYTES # BLD AUTO: 0.94 10*3/MM3 (ref 0.1–0.9)
MONOCYTES NFR BLD AUTO: 5.4 % (ref 5–12)
MONOCYTES NFR BLD AUTO: 6.7 % (ref 5–12)
NEUTROPHILS NFR BLD AUTO: 10.49 10*3/MM3 (ref 1.7–7)
NEUTROPHILS NFR BLD AUTO: 10.55 10*3/MM3 (ref 1.7–7)
NEUTROPHILS NFR BLD AUTO: 75.1 % (ref 42.7–76)
NEUTROPHILS NFR BLD AUTO: 78.8 % (ref 42.7–76)
NRBC BLD AUTO-RTO: 0 /100 WBC (ref 0–0.2)
NRBC BLD AUTO-RTO: 0 /100 WBC (ref 0–0.2)
PLATELET # BLD AUTO: 457 10*3/MM3 (ref 140–450)
PLATELET # BLD AUTO: 540 10*3/MM3 (ref 140–450)
PMV BLD AUTO: 10.6 FL (ref 6–12)
PMV BLD AUTO: 11.6 FL (ref 6–12)
POTASSIUM SERPL-SCNC: 3.7 MMOL/L (ref 3.5–5.2)
PROT SERPL-MCNC: 6 G/DL (ref 6–8.5)
RBC # BLD AUTO: 3.27 10*6/MM3 (ref 3.77–5.28)
RBC # BLD AUTO: 3.5 10*6/MM3 (ref 3.77–5.28)
SODIUM SERPL-SCNC: 140 MMOL/L (ref 136–145)
WBC NRBC COR # BLD AUTO: 13.39 10*3/MM3 (ref 3.4–10.8)
WBC NRBC COR # BLD AUTO: 13.97 10*3/MM3 (ref 3.4–10.8)

## 2024-11-05 PROCEDURE — 25010000002 HEPARIN (PORCINE) PER 1000 UNITS: Performed by: OBSTETRICS & GYNECOLOGY

## 2024-11-05 PROCEDURE — 85025 COMPLETE CBC W/AUTO DIFF WBC: CPT | Performed by: OBSTETRICS & GYNECOLOGY

## 2024-11-05 PROCEDURE — 25810000003 SODIUM CHLORIDE 0.9 % SOLUTION: Performed by: OBSTETRICS & GYNECOLOGY

## 2024-11-05 PROCEDURE — 99024 POSTOP FOLLOW-UP VISIT: CPT | Performed by: OBSTETRICS & GYNECOLOGY

## 2024-11-05 PROCEDURE — 25010000002 PIPERACILLIN SOD-TAZOBACTAM PER 1 G: Performed by: OBSTETRICS & GYNECOLOGY

## 2024-11-05 PROCEDURE — 80053 COMPREHEN METABOLIC PANEL: CPT | Performed by: SURGERY

## 2024-11-05 RX ADMIN — SODIUM CHLORIDE 125 ML/HR: 9 INJECTION, SOLUTION INTRAVENOUS at 20:46

## 2024-11-05 RX ADMIN — OXYCODONE HYDROCHLORIDE 5 MG: 5 TABLET ORAL at 22:31

## 2024-11-05 RX ADMIN — ACETAMINOPHEN 650 MG: 325 TABLET ORAL at 12:04

## 2024-11-05 RX ADMIN — DOCUSATE SODIUM 100 MG: 100 CAPSULE, LIQUID FILLED ORAL at 09:21

## 2024-11-05 RX ADMIN — DOCUSATE SODIUM 100 MG: 100 CAPSULE, LIQUID FILLED ORAL at 20:28

## 2024-11-05 RX ADMIN — Medication 10 ML: at 20:38

## 2024-11-05 RX ADMIN — PIPERACILLIN SODIUM AND TAZOBACTAM SODIUM 3.38 G: 3; .375 INJECTION, POWDER, LYOPHILIZED, FOR SOLUTION INTRAVENOUS at 11:05

## 2024-11-05 RX ADMIN — IBUPROFEN 600 MG: 600 TABLET, FILM COATED ORAL at 06:10

## 2024-11-05 RX ADMIN — PIPERACILLIN SODIUM AND TAZOBACTAM SODIUM 3.38 G: 3; .375 INJECTION, POWDER, LYOPHILIZED, FOR SOLUTION INTRAVENOUS at 02:44

## 2024-11-05 RX ADMIN — SODIUM CHLORIDE 125 ML/HR: 9 INJECTION, SOLUTION INTRAVENOUS at 02:16

## 2024-11-05 RX ADMIN — IBUPROFEN 600 MG: 600 TABLET, FILM COATED ORAL at 13:30

## 2024-11-05 RX ADMIN — PIPERACILLIN SODIUM AND TAZOBACTAM SODIUM 3.38 G: 3; .375 INJECTION, POWDER, LYOPHILIZED, FOR SOLUTION INTRAVENOUS at 20:24

## 2024-11-05 RX ADMIN — ACETAMINOPHEN 650 MG: 325 TABLET ORAL at 20:36

## 2024-11-05 RX ADMIN — HEPARIN SODIUM 5000 UNITS: 5000 INJECTION, SOLUTION INTRAVENOUS; SUBCUTANEOUS at 20:28

## 2024-11-05 RX ADMIN — HEPARIN SODIUM 5000 UNITS: 5000 INJECTION, SOLUTION INTRAVENOUS; SUBCUTANEOUS at 09:21

## 2024-11-05 NOTE — PLAN OF CARE
Goal Outcome Evaluation:  Plan of Care Reviewed With: patient           Outcome Evaluation: VSS. temp at beginning of shift. voiding and ambulating.  pain controlled with prn vitor x1. iv abx given. rpt labs this am.

## 2024-11-05 NOTE — PLAN OF CARE
Goal Outcome Evaluation:  Plan of Care Reviewed With: patient        Progress: improving    VSS. Pt. afebrile this shift. IV zosyn continues q8. WBC trending down. Pt. voiding and ambulating. Pain well controlled.  General surgery consult.

## 2024-11-05 NOTE — PROGRESS NOTES
Spoke with radiology.  The patient's abscess is not amenable to interventional radiology drainage.  Patient will need to go to the operating room to have vaginal cuff exploration, with evacuation of abscess.  I went back to see the patient again and have offered her the chance to do that this afternoon versus first thing in the morning.  Patient advised she would have to remain n.p.o. all day long if she wants to go back to surgery this afternoon.  The patient prefers to stay on antibiotics and heparin until tomorrow morning, and then reevaluate.  Ordering labs now for tomorrow morning.

## 2024-11-05 NOTE — PROGRESS NOTES
Swea Cityjc Alegria  : 1980  MRN: [id number]  [facility]: [id number]    Post-operative Day #15 from TOTAL LAPAROSCOPIC HYSTERECTOMY with BSO, DaVinci, and POD #12 return to OR for wash-out of pelvic abscess    Hospital Day #3 for this readmission    Subjective   Medications being utilized for pain control: acetaminophen and ibuprofen (OTC)  and oxycodone.  Patient returned to the emergency room  when she had fevers, chills, and nausea.  During the patient's previous hospitalization she had Zosyn every 8 hours for several days.  She was then discharged home on Augmentin and just completed that antibiotic 3 days prior to coming back.  The patient says she was doing well, with just typical postop soreness, until .  Yesterday morning the patient looked good, was reporting normal bladder and bowel function, and had been afebrile since admission.  This morning, however, the patient has not had a fever several times during the night; Aubree reports that she feels clammy, with myalgias.  Overall, the patient was feeling well yesterday, and today feels unwell.  She still reports normal bladder and bowel function.  Patient experiencing intermittent nausea and required Zofran last evening.    A chest x-ray at the time of admission was negative for any findings.  UA was negative for any sign of infection.    CT Angiogram Chest    Result Date: 11/3/2024  1. No evidence of pulmonary thromboembolic disease. The thoracic aorta is normal in caliber with no evidence of dissection or aneurysm. The lungs are clear. 2. Left breast cyst.  This report was signed and finalized on 11/3/2024 9:03 PM by Dr. Tera Ward MD.      CT Abdomen Pelvis With Contrast    Result Date: 11/3/2024  1. The patient has undergone recent hysterectomy. There is a postoperative abscess noted at the level of the apex of the vaginal cuff containing some air. This shows interval improvement from the previous examination of 10/24/2024.  There is  persistent inflammatory stranding within the pelvis. There is also air within the urinary bladder with mild enhancement of the bladder wall and stranding in the perivesical space. Correlation is recommended with the patient's urinalysis. No free fluid in the pelvis. 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 3. No evidence of nephrolithiasis or obstructive uropathy. No free flowing effusion present within the abdomen or pelvis. 4. Constipation with increased stool within the right and transverse colon. No mechanical bowel obstruction..    This report was signed and finalized on 11/3/2024 8:21 PM by Dr. Tera Ward MD.      XR Chest 1 View    Result Date: 11/3/2024  Impression: No evidence of acute cardiopulmonary disease.  This report was signed and finalized on 11/3/2024 7:31 PM by Dr. Tera Ward MD.           Objective     Min/max vitals past 24 hours:   Temp  Min: 97.4 °F (36.3 °C)  Max: 101.9 °F (38.8 °C)  BP  Min: 94/61  Max: 134/83  Pulse  Min: 78  Max: 123  Resp  Min: 16  Max: 20        I/O last 3 completed shifts:  In: 1100 [IV Piggyback:1100]  Out: 1200 [Urine:1200]    General: well developed; well nourished  no acute distress   Lungs:  Abdomen: breathing is unlabored  soft, with generalized tenderness; no masses  incisions are healing, clean, dry, intact, and without drainage   Pelvic: Not performed   Ext: Calves NT     WBC   Date/Time Value Ref Range Status   11/05/2024 0527 13.97 (H) 3.40 - 10.80 10*3/mm3 Final   11/02/2023 1500 4.98 3.40 - 10.80 10*3/mm3 Final     Hemoglobin   Date/Time Value Ref Range Status   11/05/2024 0527 7.5 (L) 12.0 - 15.9 g/dL Final     Hematocrit   Date/Time Value Ref Range Status   11/05/2024 0527 24.7 (L) 34.0 - 46.6 % Final     Platelets   Date/Time Value Ref Range Status   11/05/2024 0527 457 (H) 140 - 450 10*3/mm3 Final        Assessment   Post-op Day #15 and 12 S/P DaVinci TOTAL LAPAROSCOPIC HYSTERECTOMY with BSO, followed a few days later by  return to OR for pelvic wash-out and adhesiolysis  Patient not feeling well this morning, condition has declined since yesterday.  She has also now had a fever several times since admission  WBCs elevated at 18.7 on admission.  WBCs 13.6 yesterday and 13.97 today.  Fever up to 101.9 last evening   Plan   I do not plan to take the patient back to the operating room today.  We have discussed the possibility of septic pelvic thrombophlebitis as a contributing factor.  Because thrombosis was noted in the right gonadal vein on CT, twice daily heparin was started yesterday.  We have discussed SPT.  If this is a contributing factor, the patient is already on the appropriate treatment, which is antibiotics plus anticoagulation.  The patient is very reasonably frustrated and wants to do what ever will help her get better the fastest.  We have agreed to consult interventional radiology today (while continuing antibiotics and heparin) for possible IR drainage of the 4 x 6 cm cuff abscess.  If the radiologist does not think it is amenable to IR drainage, will make patient n.p.o. after midnight with consideration for possible return to the OR tomorrow to open up the vaginal cuff.  General Surgery following and I appreciate their input.  Patient on Zosyn 0.375 g IV every 8 hours  Zofran prn for nausua  Heparin bid added on 11/4/24, since there was thrombosis noted in the right gonadal vein CT  Also added Colace bid yesterday since the CT notes patient to be constipated    Kathy Marie MD  11/5/2024  07:19 CST

## 2024-11-05 NOTE — PAYOR COMM NOTE
"Una Hinson (44 y.o. Female)    [id number]     admit   INPT           [facility] phone    fax          Obs 11/3 and          INPT order written              Date of Birth   1980    Social Security Number       Address   [address] [facility] [address]    Home Phone   [phone]    MRN   [id number]       Episcopal   Hoahaoism    Marital Status                               Admission Date   11/3/24    Admission Type   Emergency    Admitting Provider   Kathy Marie MD    Attending Provider   Kathy Marie MD    Department, Room/Bed   [facility] 2A, M201/1       Discharge Date       Discharge Disposition       Discharge Destination                                 Attending Provider: Kathy Marie MD    Allergies: Ketorolac Tromethamine, Tramadol    Isolation: None   Infection: None   Code Status: CPR    Ht: 157.5 cm (62\")   Wt: 54.9 kg (121 lb)    Admission Cmt: None   Principal Problem: Postoperative abscess [T81.49XA]                   Active Insurance as of 11/3/2024       Primary Coverage       Payor Plan Insurance Group Employer/Plan Group    [employer]HEM BLUE CROSS ANTHEM BLUE CROSS BLUE SHIELD PPO [id number]       Payor Plan Address Payor Plan Phone Number Payor Plan Fax Number Effective Dates    [address] [phone]  2023 - None Entered    [place] [address]         Subscriber Name Subscriber Birth Date Member ID       UNA HINSON 1980 [id number]                     Emergency Contacts        (Rel.) Home Phone Work Phone Mobile Phone    BROCK GOMEZ (Father) [phone] -- --                 History & Physical        Lance Mcclain DO at 24 2138          [facility]   HISTORY AND PHYSICAL    Patient Name:Una Hinson  : 1980  MRN: [id number]  Primary Care Physician: Rashawn Mcgovern MD  Date of admission: " Discharge    Pt admitted with heart failure exacerbation. Pt received IV diuretic's  with good results and transitioned back to oral diuretic's and continues to decrease weight . Pt reports much less SOB and LE edema slowly decreasing.Pt getting up with cane and SBA. Pt eating and drinking. Pt reported never being told to watch her sodium intake prior to writer explaining HF teaching. Pt was instructed on every aspect of heart failure (3 hours in teaching) starting with definition to9 treatment to diet,activity,medications , monitoring of weight, progression of diease and resources for extended education . Pt instructed on follow up appointments, home care and diabetes follow up.Pt given printed material on heart failure in addition to discharge instructions.Pt's IV and telemetry removed. Pt's belongings packed up and pt escorted via WC to pharmacy and then to front door safely. Pt denied any questions or concerns       11/3/2024    Subjective  Subjective     Chief Complaint: fever     Aubree Alegria is a 44 y.o. female who presents to the ED with fever and abdominal pain, now 13 days postop from robotic assisted TLH/BSO, and 10 days postop following readmission and washout for a 11 cm pelvic abscess.  She had been doing well and saw both Dr. Rdz and Dr. Marie last week for the postop visits.  Her drain was pulled at that time.  In the last 24 hours she began running a fever again and feeling ill.  She denies significant change in her abdominal pain and has been taking Tylenol and Motrin for the discomfort.  She denies vaginal bleeding or vaginal discharge.  Her abdominal incisions are healing well.  Evaluation in the ED tonight showed a 6.4 cm cuff abscess with leukocytosis, elevated inflammatory markers.       Personal History     Past Medical History:   Diagnosis Date    Spinal headache        Past Surgical History:   Procedure Laterality Date    APPENDECTOMY      BREAST BIOPSY Bilateral     benign     SECTION      x2    DIAGNOSTIC LAPAROSCOPY N/A 10/24/2024    Procedure: DIAGNOSTIC LAPAROSCOPY WITH DAVINCI ROBOT;  Surgeon: Sami Villalobos MD;  Location: [facility] OR;  Service: Obstetrics/Gynecology;  Laterality: N/A;    DIAGNOSTIC LAPAROSCOPY N/A 10/24/2024    Procedure: DIAGNOSTIC LAPAROSCOPY WITH DAVINCI ROBOT;  Surgeon: Goran Rdz MD;  Location: [facility] OR;  Service: General;  Laterality: N/A;    ECTOPIC PREGNANCY      TOTAL LAPAROSCOPIC HYSTERECTOMY SALPINGO OOPHORECTOMY Bilateral 10/21/2024    Procedure: TOTAL LAPAROSCOPIC HYSTERECTOMY BILATERAL SALPINGOOPHORECTOMY WITH DAVINCI ROBOT;  Surgeon: Kathy Marie MD;  Location: [facility] OR;  Service: Robotics - DaVinci;  Laterality: Bilateral;    TUBAL ABDOMINAL LIGATION      WISDOM TOOTH EXTRACTION         Family History: Her family history includes Breast cancer (age of onset: 40) in her paternal aunt; Hypertension in her father; Kidney cancer in her maternal  aunt; Leukemia in her paternal grandmother; No Known Problems in her mother and sister.     Social History: She  reports that she has been smoking cigarettes. She has been exposed to tobacco smoke. She has never used smokeless tobacco. She reports that she does not drink alcohol and does not use drugs.    Home Medications:  acetaminophen, naloxone, ondansetron ODT, oxyCODONE, and potassium chloride    Allergies:  She is allergic to ketorolac tromethamine and tramadol.    Objective   Objective     Vitals:    Temp:  [99.2 °F (37.3 °C)] 99.2 °F (37.3 °C)  Heart Rate:  [] 111  Resp:  [18] 18  BP: (118-132)/(73-96) 132/96  Output by Drain (mL) 11/02/24 0701 - 11/02/24 1900 11/02/24 1901 - 11/03/24 0700 11/03/24 0701 - 11/03/24 1900 11/03/24 1901 - 11/03/24 2138 Range Total   Closed/Suction Drain 1 RLQ Bulb            Physical Exam  Constitutional:       General: She is not in acute distress.     Appearance: She is ill-appearing.   Abdominal:      General: Abdomen is flat.      Palpations: Abdomen is soft.      Tenderness: There is no abdominal tenderness. There is no guarding.   Neurological:      General: No focal deficit present.      Mental Status: She is alert.   Psychiatric:         Mood and Affect: Mood normal.          Result Review   Result Review:  I have personally reviewed the results from the time of this admission to 11/3/2024 21:38 CST and agree with these findings:  [x]  Laboratory list / accordion  []  Microbiology  [x]  Radiology  []  EKG/Telemetry   []  Cardiology/Vascular   []  Pathology  []  Old records  []  Other:  Most notable findings include:     Admission on 11/03/2024   Component Date Value Ref Range Status    Glucose 11/03/2024 113 (H)  65 - 99 mg/dL Final    BUN 11/03/2024 12  6 - 20 mg/dL Final    Creatinine 11/03/2024 0.62  0.57 - 1.00 mg/dL Final    Sodium 11/03/2024 139  136 - 145 mmol/L Final    Potassium 11/03/2024 4.1  3.5 - 5.2 mmol/L Final    Chloride 11/03/2024 104  98 - 107  mmol/L Final    CO2 11/03/2024 25.0  22.0 - 29.0 mmol/L Final    Calcium 11/03/2024 8.6  8.6 - 10.5 mg/dL Final    Total Protein 11/03/2024 7.5  6.0 - 8.5 g/dL Final    Albumin 11/03/2024 3.9  3.5 - 5.2 g/dL Final    ALT (SGPT) 11/03/2024 32  1 - 33 U/L Final    AST (SGOT) 11/03/2024 18  1 - 32 U/L Final    Alkaline Phosphatase 11/03/2024 70  39 - 117 U/L Final    Total Bilirubin 11/03/2024 0.2  0.0 - 1.2 mg/dL Final    Globulin 11/03/2024 3.6  gm/dL Final    A/G Ratio 11/03/2024 1.1  g/dL Final    BUN/Creatinine Ratio 11/03/2024 19.4  7.0 - 25.0 Final    Anion Gap 11/03/2024 10.0  5.0 - 15.0 mmol/L Final    eGFR 11/03/2024 111.4  >60.0 mL/min/1.73 Final    Color, UA 11/03/2024 Yellow  Yellow, Straw Final    Appearance, UA 11/03/2024 Clear  Clear Final    pH, UA 11/03/2024 6.0  5.0 - 8.0 Final    Specific Gravity, UA 11/03/2024 1.022  1.005 - 1.030 Final    Glucose, UA 11/03/2024 Negative  Negative Final    Ketones, UA 11/03/2024 Negative  Negative Final    Bilirubin, UA 11/03/2024 Negative  Negative Final    Blood, UA 11/03/2024 Negative  Negative Final    Protein, UA 11/03/2024 Negative  Negative Final    Leuk Esterase, UA 11/03/2024 Negative  Negative Final    Nitrite, UA 11/03/2024 Negative  Negative Final    Urobilinogen, UA 11/03/2024 0.2 E.U./dL  0.2 - 1.0 E.U./dL Final    Lipase 11/03/2024 28  13 - 60 U/L Final    D-Dimer, Quantitative 11/03/2024 2.75 (H)  0.00 - 0.50 MCGFEU/mL Final    Lactate 11/03/2024 1.2  0.5 - 2.0 mmol/L Final    Procalcitonin 11/03/2024 0.08  0.00 - 0.25 ng/mL Final    C-Reactive Protein 11/03/2024 5.54 (H)  0.00 - 0.50 mg/dL Final    Sed Rate 11/03/2024 116 (H)  0 - 20 mm/hr Final    ADENOVIRUS, PCR 11/03/2024 Not Detected  Not Detected Final    Coronavirus 229E 11/03/2024 Not Detected  Not Detected Final    Coronavirus HKU1 11/03/2024 Not Detected  Not Detected Final    Coronavirus NL63 11/03/2024 Not Detected  Not Detected Final    Coronavirus OC43 11/03/2024 Not Detected  Not  Detected Final    COVID19 11/03/2024 Not Detected  Not Detected - Ref. Range Final    Human Metapneumovirus 11/03/2024 Not Detected  Not Detected Final    Human Rhinovirus/Enterovirus 11/03/2024 Not Detected  Not Detected Final    Influenza A PCR 11/03/2024 Not Detected  Not Detected Final    Influenza B PCR 11/03/2024 Not Detected  Not Detected Final    Parainfluenza Virus 1 11/03/2024 Not Detected  Not Detected Final    Parainfluenza Virus 2 11/03/2024 Not Detected  Not Detected Final    Parainfluenza Virus 3 11/03/2024 Not Detected  Not Detected Final    Parainfluenza Virus 4 11/03/2024 Not Detected  Not Detected Final    RSV, PCR 11/03/2024 Not Detected  Not Detected Final    Bordetella pertussis pcr 11/03/2024 Not Detected  Not Detected Final    Bordetella parapertussis PCR 11/03/2024 Not Detected  Not Detected Final    Chlamydophila pneumoniae PCR 11/03/2024 Not Detected  Not Detected Final    Mycoplasma pneumo by PCR 11/03/2024 Not Detected  Not Detected Final    WBC 11/03/2024 18.72 (H)  3.40 - 10.80 10*3/mm3 Final    RBC 11/03/2024 3.93  3.77 - 5.28 10*6/mm3 Final    Hemoglobin 11/03/2024 9.1 (L)  12.0 - 15.9 g/dL Final    Hematocrit 11/03/2024 29.5 (L)  34.0 - 46.6 % Final    MCV 11/03/2024 75.1 (L)  79.0 - 97.0 fL Final    MCH 11/03/2024 23.2 (L)  26.6 - 33.0 pg Final    MCHC 11/03/2024 30.8 (L)  31.5 - 35.7 g/dL Final    RDW 11/03/2024 18.0 (H)  12.3 - 15.4 % Final    RDW-SD 11/03/2024 48.7  37.0 - 54.0 fl Final    MPV 11/03/2024 10.5  6.0 - 12.0 fL Final    Platelets 11/03/2024 633 (H)  140 - 450 10*3/mm3 Final    Neutrophil % 11/03/2024 80.6 (H)  42.7 - 76.0 % Final    Lymphocyte % 11/03/2024 12.7 (L)  19.6 - 45.3 % Final    Monocyte % 11/03/2024 4.6 (L)  5.0 - 12.0 % Final    Eosinophil % 11/03/2024 0.2 (L)  0.3 - 6.2 % Final    Basophil % 11/03/2024 0.4  0.0 - 1.5 % Final    Immature Grans % 11/03/2024 1.5 (H)  0.0 - 0.5 % Final    Neutrophils, Absolute 11/03/2024 15.09 (H)  1.70 - 7.00 10*3/mm3  Final    Lymphocytes, Absolute 11/03/2024 2.38  0.70 - 3.10 10*3/mm3 Final    Monocytes, Absolute 11/03/2024 0.87  0.10 - 0.90 10*3/mm3 Final    Eosinophils, Absolute 11/03/2024 0.03  0.00 - 0.40 10*3/mm3 Final    Basophils, Absolute 11/03/2024 0.07  0.00 - 0.20 10*3/mm3 Final    Immature Grans, Absolute 11/03/2024 0.28 (H)  0.00 - 0.05 10*3/mm3 Final    nRBC 11/03/2024 0.0  0.0 - 0.2 /100 WBC Final         CT Abdomen Pelvis With Contrast    Result Date: 11/3/2024  CT ABDOMEN PELVIS W CONTRAST- 11/3/2024 6:57 PM  HISTORY: recent hysterectomy with postoperative abscess   COMPARISON: Outside CT dated 10/24/2024.  DLP: 271.98 mGy.cm. All CT scans are performed using dose optimization techniques as appropriate to the performed exam and including at least one of the following: Automated exposure control, adjustment of the mA and/or kV according to size, and the use of the iterative reconstruction technique.  TECHNIQUE: Following the intravenous administration of contrast, helical CT tomographic images of the abdomen and pelvis were acquired. Coronal reformatted images were also provided for review.  FINDINGS: The lung bases and base of the heart are unremarkable.  LIVER: No focal liver lesion. The hepatic vasculature is patent.  BILIARY SYSTEM: The gallbladder is unremarkable. No intrahepatic or extrahepatic ductal dilatation.  PANCREAS: No focal pancreatic lesion.  SPLEEN: Unremarkable.  KIDNEYS AND ADRENALS: Bilateral kidneys and adrenal glands are unremarkable. The ureters are decompressed and normal in appearance.  RETROPERITONEUM: No mass, lymphadenopathy or hemorrhage.  GI TRACT: There is constipation with increased stool within the right and transverse colon. The small bowel is normal in distribution and appearance. No evidence of mechanical obstruction. No gastric wall thickening or gastric outlet obstruction.. The appendix is surgically absent..  OTHER: No mesenteric mass is demonstrated. No evidence of  adenopathy in the small bowel mesentery.. 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 This is new from the previous examination. The patient has undergone recent hysterectomy.. The osseous structures and soft tissues demonstrate no worrisome lesions. A fat-containing periumbilical hernia is present.  PELVIS: The patient has undergone hysterectomy. There is a small volume of air within the urinary bladder. There is mild stranding in the perivesical space. At the apex of the vaginal cuff there is a fluid collection containing some air which measures approximately 6.4 x 4.4 cm in size consistent with a postoperative abscess. This shows significant interval improvement from the previous examination of 10/24/2024 at which time this collection measured 11.0 x 6.7 cm in size. There is residual inflammatory stranding within the pelvic fat.. No significant bladder wall thickening is present..      Impression: 1. The patient has undergone recent hysterectomy. There is a postoperative abscess noted at the level of the apex of the vaginal cuff containing some air. This shows interval improvement from the previous examination of 10/24/2024. There is  persistent inflammatory stranding within the pelvis. There is also air within the urinary bladder with mild enhancement of the bladder wall and stranding in the perivesical space. Correlation is recommended with the patient's urinalysis. No free fluid in the pelvis. 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 3. No evidence of nephrolithiasis or obstructive uropathy. No free flowing effusion present within the abdomen or pelvis. 4. Constipation with increased stool within the right and transverse colon. No mechanical bowel obstruction..    This report was signed and finalized on 11/3/2024 8:21 PM by Dr. Tera Ward MD.           Assessment & Plan  Assessment / Plan     Brief Patient Summary:  Aubree Alegria is a 44 y.o. female with a history of a postop abscess  status post washout 10 days ago, returns with new onset fever, with imaging today showing persistent but smaller pelvic abscess.  CT shows abscesses at the cuff measuring 6.4 x 4.4 cm.    Active Hospital Problems:  Active Hospital Problems    Diagnosis     **Postoperative abscess      Plan:   Admit to 2A  Zosyn q6hr  NPO after midnight  Repeat CBC in AM  I discussed with Dr. Goldstein regarding who is on call for [facility]. Plan Abx overnight and consider drainage in AM.   Plan of care was discussed with patient and family. All questions were answered.     VTE Prophylaxis:  No VTE prophylaxis order currently exists.        Lance Mcclain DO    Electronically signed by Lance Mcclain DO at 11/03/24 2159          Emergency Department Notes        Tonya Miller RN at 11/03/24 2156          Nursing report ED to floor  Aubree Alegria  44 y.o.  female    HPI:   Chief Complaint   Patient presents with    Post-op Problem    Fever       Admitting doctor:   Kathy Marie MD    Consulting provider(s):  Consults       No orders found from 10/5/2024 to 11/4/2024.             Admitting diagnosis:   The primary encounter diagnosis was Postoperative abscess. Diagnoses of Fever, unspecified fever cause and History of hysterectomy were also pertinent to this visit.    Code status:   Current Code Status       Date Active Code Status Order ID Comments User Context       11/3/2024 2147 CPR (Attempt to Resuscitate) [id number]  Lance Mcclain DO ED        Question Answer    Code Status (Patient has no pulse and is not breathing) CPR (Attempt to Resuscitate)    Medical Interventions (Patient has pulse or is breathing) Full Support                    Allergies:   Ketorolac tromethamine and Tramadol    Intake and Output    Intake/Output Summary (Last 24 hours) at 11/3/2024 2156  Last data filed at 11/3/2024 2148  Gross per 24 hour   Intake 1100 ml   Output --   Net 1100 ml       Weight:       11/03/24  1904   Weight: 54.9 kg (121  "lb)       Most recent vitals:   Vitals:    11/03/24 2109 11/03/24 2116 11/03/24 2131 11/03/24 2146   BP: 129/79 132/96 153/88 132/78   BP Location:       Patient Position:       Pulse: 94 111 100 95   Resp:       Temp:       TempSrc:       SpO2: 99% 100% 99% 100%   Weight:       Height:         Oxygen Therapy: .    Active LDAs/IV Access:   Lines, Drains & Airways       Active LDAs       Name Placement date Placement time Site Days    Peripheral IV Anterior;Right Forearm --  --  Forearm  --    Peripheral IV 11/03/24 1940 Anterior;Left Forearm 11/03/24 1940  Forearm  less than 1    Closed/Suction Drain 1 RLQ Bulb 10/25/24  0038  RLQ  9                    Labs (abnormal labs have a star):   Labs Reviewed   COMPREHENSIVE METABOLIC PANEL - Abnormal; Notable for the following components:       Result Value    Glucose 113 (*)     All other components within normal limits    Narrative:     GFR Normal >60  Chronic Kidney Disease <60  Kidney Failure <15     D-DIMER, QUANTITATIVE - Abnormal; Notable for the following components:    D-Dimer, Quantitative 2.75 (*)     All other components within normal limits    Narrative:     According to the assay 's published package insert, a normal (<0.50 MCGFEU/mL) D-dimer result in conjunction with a non-high clinical probability assessment, excludes deep vein thrombosis (DVT) and pulmonary embolism (PE) with high sensitivity.    D-dimer values increase with age and this can make VTE exclusion of an older population difficult. To address this, the American College of [employer], based on best available evidence and recent guidelines, recommends that clinicians use age-adjusted D-dimer thresholds in patients greater than 50 years of age with: a) a low probability of PE who do not meet all Pulmonary Embolism Rule Out Criteria, or b) in those with intermediate probability of PE.   The formula for an age-adjusted D-dimer cut-off is \"age/100\".  For example, a 60 year old patient " would have an age-adjusted cut-off of 0.60 MCGFEU/mL and an 80 year old 0.80 MCGFEU/mL.   C-REACTIVE PROTEIN - Abnormal; Notable for the following components:    C-Reactive Protein 5.54 (*)     All other components within normal limits   SEDIMENTATION RATE - Abnormal; Notable for the following components:    Sed Rate 116 (*)     All other components within normal limits   CBC WITH AUTO DIFFERENTIAL - Abnormal; Notable for the following components:    WBC 18.72 (*)     Hemoglobin 9.1 (*)     Hematocrit 29.5 (*)     MCV 75.1 (*)     MCH 23.2 (*)     MCHC 30.8 (*)     RDW 18.0 (*)     Platelets 633 (*)     Neutrophil % 80.6 (*)     Lymphocyte % 12.7 (*)     Monocyte % 4.6 (*)     Eosinophil % 0.2 (*)     Immature Grans % 1.5 (*)     Neutrophils, Absolute 15.09 (*)     Immature Grans, Absolute 0.28 (*)     All other components within normal limits   RESPIRATORY PANEL PCR W/ COVID-19 (SARS-COV-2), NP SWAB IN UTM/VTP, 2 HR TAT - Normal    Narrative:     In the setting of a positive respiratory panel with a viral infection PLUS a negative procalcitonin without other underlying concern for bacterial infection, consider observing off antibiotics or discontinuation of antibiotics and continue supportive care. If the respiratory panel is positive for atypical bacterial infection (Bordetella pertussis, Chlamydophila pneumoniae, or Mycoplasma pneumoniae), consider antibiotic de-escalation to target atypical bacterial infection.   URINALYSIS W/ CULTURE IF INDICATED - Normal    Narrative:     In absence of clinical symptoms, the presence of pyuria, bacteria, and/or nitrites on the urinalysis result does not correlate with infection.  Urine microscopic not indicated.   LIPASE - Normal   LACTIC ACID, PLASMA - Normal   PROCALCITONIN - Normal    Narrative:     As a Marker for Sepsis (Non-Neonates):    1. <0.5 ng/mL represents a low risk of severe sepsis and/or septic shock.  2. >2 ng/mL represents a high risk of severe sepsis and/or  "septic shock.    As a Marker for Lower Respiratory Tract Infections that require antibiotic therapy:    PCT on Admission    Antibiotic Therapy       6-12 Hrs later    >0.5                Strongly Recommended  >0.25 - <0.5        Recommended   0.1 - 0.25          Discouraged              Remeasure/reassess PCT  <0.1                Strongly Discouraged     Remeasure/reassess PCT    As 28 day mortality risk marker: \"Change in Procalcitonin Result\" (>80% or <=80%) if Day 0 (or Day 1) and Day 4 values are available. Refer to http://www.[facility] [place]pct-calculator.com    Change in PCT <=80%  A decrease of PCT levels below or equal to 80% defines a positive change in PCT test result representing a higher risk for 28-day all-cause mortality of patients diagnosed with severe sepsis for septic shock.    Change in PCT >80%  A decrease of PCT levels of more than 80% defines a negative change in PCT result representing a lower risk for 28-day all-cause mortality of patients diagnosed with severe sepsis or septic shock.      BLOOD CULTURE   BLOOD CULTURE   CBC AND DIFFERENTIAL    Narrative:     The following orders were created for panel order CBC & Differential.  Procedure                               Abnormality         Status                     ---------                               -----------         ------                     CBC Auto Differential[[id number]]        Abnormal            Final result                 Please view results for these tests on the individual orders.       Meds given in ED:   Medications   sodium chloride 0.9 % flush 10 mL (has no administration in time range)   acetaminophen (TYLENOL) tablet 1,000 mg (1,000 mg Oral Not Given 11/3/24 1946)   sodium chloride 0.9 % bolus 1,000 mL (0 mL Intravenous Stopped 11/3/24 2148)   iopamidol (ISOVUE-300) 61 % injection 100 mL (100 mL Intravenous Given 11/3/24 2008)   iopamidol (ISOVUE-370) 76 % injection 100 mL (100 mL Intravenous Given 11/3/24 2053) "   piperacillin-tazobactam (ZOSYN) 3.375 g IVPB in 100 mL NS MBP (CD) (0 g Intravenous Stopped 11/3/24 2148)           NIH Stroke Scale:       Isolation/Infection(s):  No active isolations   No active infections     COVID Testing  Collected .  Resulted .    Nursing report ED to floor:  Mental status: .  Ambulatory status: .  Precautions: .    ED nurse phone extentsion- ..      Electronically signed by Tonya Miller RN at 11/03/24 2156       Krystyna Sánchez APRN at 11/03/24 1923       Attestation signed by Carlos Herman MD at 11/03/24 2224        SUPERVISE: For this patient encounter, I reviewed the APC's documentation, treatment plan, and medical decision making.  Virgil Gonzalez MD 11/3/2024 22:23 CST                         Subjective   History of Present Illness  Patient is a 44-year-old female who presents emergency department with multiple complaints.  Reports recent hysterectomy on 10/21/2024.  Patient reports that 3 days later she developed a fever and had a postoperative abscess.  Was taken back to the OR for drainage of this.  Patient reports that she was discharged 1 week ago from today.  Patient has been taking Augmentin.  Reports she had her follow-ups this past Wednesday with general surgery and GYN.  DANNA drain was removed at that appointment.  Patient reports that she developed a fever today.  Reports temperature as high as 101.5.  States that she just does not feel well.  She reports pain to her bilateral shoulders, right lateral ribs, lower back, abdomen.  Reports a tight sensation in her abdomen.  Reports nausea, no vomiting.  No diarrhea. She denies any urinary symptoms.  Denies cough.  Denies chest pain.  Denies shortness of breath.        Review of Systems   Constitutional:  Positive for chills, fatigue and fever.   Gastrointestinal:  Positive for abdominal pain and nausea.   Musculoskeletal:  Positive for arthralgias and myalgias.        Right rib pain   All other systems reviewed and are  negative.      Past Medical History:   Diagnosis Date    Spinal headache        Allergies   Allergen Reactions    Ketorolac Tromethamine Hives    Tramadol Hives       Past Surgical History:   Procedure Laterality Date    APPENDECTOMY      BREAST BIOPSY Bilateral     benign     SECTION      x2    DIAGNOSTIC LAPAROSCOPY N/A 10/24/2024    Procedure: DIAGNOSTIC LAPAROSCOPY WITH DAVINCI ROBOT;  Surgeon: Sami Villalobos MD;  Location:  [facility];  Service: Obstetrics/Gynecology;  Laterality: N/A;    DIAGNOSTIC LAPAROSCOPY N/A 10/24/2024    Procedure: DIAGNOSTIC LAPAROSCOPY WITH DAVINCI ROBOT;  Surgeon: Goran Rdz MD;  Location:  [facility];  Service: General;  Laterality: N/A;    ECTOPIC PREGNANCY      TOTAL LAPAROSCOPIC HYSTERECTOMY SALPINGO OOPHORECTOMY Bilateral 10/21/2024    Procedure: TOTAL LAPAROSCOPIC HYSTERECTOMY BILATERAL SALPINGOOPHORECTOMY WITH DAVINCI ROBOT;  Surgeon: Kathy Marie MD;  Location:  [facility];  Service: Robotics - DaVinci;  Laterality: Bilateral;    TUBAL ABDOMINAL LIGATION      WISDOM TOOTH EXTRACTION         Family History   Problem Relation Age of Onset    Kidney cancer Maternal Aunt     Hypertension Father     No Known Problems Mother     No Known Problems Sister     Leukemia Paternal Grandmother     Breast cancer Paternal Aunt 40    Ovarian cancer Neg Hx     Uterine cancer Neg Hx     Colon cancer Neg Hx     Melanoma Neg Hx     Prostate cancer Neg Hx        Social History     Socioeconomic History    Marital status:    Tobacco Use    Smoking status: Some Days     Types: Cigarettes     Passive exposure: Current    Smokeless tobacco: Never   Vaping Use    Vaping status: Never Used   Substance and Sexual Activity    Alcohol use: Never    Drug use: Never    Sexual activity: Yes     Partners: Male     Birth control/protection: Surgical           Objective   Physical Exam  Vitals and nursing note reviewed.   Constitutional:       General: She is not in acute distress.      Appearance: Normal appearance. She is normal weight. She is not ill-appearing or toxic-appearing.   HENT:      Head: Normocephalic.   Cardiovascular:      Rate and Rhythm: Normal rate and regular rhythm.      Pulses: Normal pulses.      Heart sounds: Normal heart sounds.   Pulmonary:      Effort: Pulmonary effort is normal.      Breath sounds: Normal breath sounds.   Abdominal:      General: Abdomen is flat. Bowel sounds are normal. There is no distension.      Palpations: Abdomen is soft.      Tenderness: There is abdominal tenderness (Generalized). There is right CVA tenderness and left CVA tenderness.      Comments: Incisions appear clean dry and intact.  No erythema present.   Musculoskeletal:         General: Normal range of motion.      Cervical back: Normal range of motion and neck supple.   Skin:     General: Skin is warm and dry.   Neurological:      General: No focal deficit present.      Mental Status: She is alert and oriented to person, place, and time. Mental status is at baseline.   Psychiatric:         Mood and Affect: Mood normal.         Behavior: Behavior normal.         Thought Content: Thought content normal.         Judgment: Judgment normal.         Procedures      Labs Reviewed   COMPREHENSIVE METABOLIC PANEL - Abnormal; Notable for the following components:       Result Value    Glucose 113 (*)     All other components within normal limits    Narrative:     GFR Normal >60  Chronic Kidney Disease <60  Kidney Failure <15     D-DIMER, QUANTITATIVE - Abnormal; Notable for the following components:    D-Dimer, Quantitative 2.75 (*)     All other components within normal limits    Narrative:     According to the assay 's published package insert, a normal (<0.50 MCGFEU/mL) D-dimer result in conjunction with a non-high clinical probability assessment, excludes deep vein thrombosis (DVT) and pulmonary embolism (PE) with high sensitivity.    D-dimer values increase with age and this can  "make VTE exclusion of an older population difficult. To address this, the [employer], based on best available evidence and recent guidelines, recommends that clinicians use age-adjusted D-dimer thresholds in patients greater than 50 years of age with: a) a low probability of PE who do not meet all Pulmonary Embolism Rule Out Criteria, or b) in those with intermediate probability of PE.   The formula for an age-adjusted D-dimer cut-off is \"age/100\".  For example, a 60 year old patient would have an age-adjusted cut-off of 0.60 MCGFEU/mL and an 80 year old 0.80 MCGFEU/mL.   C-REACTIVE PROTEIN - Abnormal; Notable for the following components:    C-Reactive Protein 5.54 (*)     All other components within normal limits   SEDIMENTATION RATE - Abnormal; Notable for the following components:    Sed Rate 116 (*)     All other components within normal limits   CBC WITH AUTO DIFFERENTIAL - Abnormal; Notable for the following components:    WBC 18.72 (*)     Hemoglobin 9.1 (*)     Hematocrit 29.5 (*)     MCV 75.1 (*)     MCH 23.2 (*)     MCHC 30.8 (*)     RDW 18.0 (*)     Platelets 633 (*)     Neutrophil % 80.6 (*)     Lymphocyte % 12.7 (*)     Monocyte % 4.6 (*)     Eosinophil % 0.2 (*)     Immature Grans % 1.5 (*)     Neutrophils, Absolute 15.09 (*)     Immature Grans, Absolute 0.28 (*)     All other components within normal limits   RESPIRATORY PANEL PCR W/ COVID-19 (SARS-COV-2), NP SWAB IN UTM/VTP, 2 HR TAT - Normal    Narrative:     In the setting of a positive respiratory panel with a viral infection PLUS a negative procalcitonin without other underlying concern for bacterial infection, consider observing off antibiotics or discontinuation of antibiotics and continue supportive care. If the respiratory panel is positive for atypical bacterial infection (Bordetella pertussis, Chlamydophila pneumoniae, or Mycoplasma pneumoniae), consider antibiotic de-escalation to target atypical bacterial infection. " "  URINALYSIS W/ CULTURE IF INDICATED - Normal    Narrative:     In absence of clinical symptoms, the presence of pyuria, bacteria, and/or nitrites on the urinalysis result does not correlate with infection.  Urine microscopic not indicated.   LIPASE - Normal   LACTIC ACID, PLASMA - Normal   PROCALCITONIN - Normal    Narrative:     As a Marker for Sepsis (Non-Neonates):    1. <0.5 ng/mL represents a low risk of severe sepsis and/or septic shock.  2. >2 ng/mL represents a high risk of severe sepsis and/or septic shock.    As a Marker for Lower Respiratory Tract Infections that require antibiotic therapy:    PCT on Admission    Antibiotic Therapy       6-12 Hrs later    >0.5                Strongly Recommended  >0.25 - <0.5        Recommended   0.1 - 0.25          Discouraged              Remeasure/reassess PCT  <0.1                Strongly Discouraged     Remeasure/reassess PCT    As 28 day mortality risk marker: \"Change in Procalcitonin Result\" (>80% or <=80%) if Day 0 (or Day 1) and Day 4 values are available. Refer to http://www.[employer]-pct-calculator.com    Change in PCT <=80%  A decrease of PCT levels below or equal to 80% defines a positive change in PCT test result representing a higher risk for 28-day all-cause mortality of patients diagnosed with severe sepsis for septic shock.    Change in PCT >80%  A decrease of PCT levels of more than 80% defines a negative change in PCT result representing a lower risk for 28-day all-cause mortality of patients diagnosed with severe sepsis or septic shock.      BLOOD CULTURE   BLOOD CULTURE   CBC AND DIFFERENTIAL    Narrative:     The following orders were created for panel order CBC & Differential.  Procedure                               Abnormality         Status                     ---------                               -----------         ------                     CBC Auto Differential[[id number]]        Abnormal            Final result                 Please view " results for these tests on the individual orders.     CT Angiogram Chest   Final Result   1. No evidence of pulmonary thromboembolic disease. The thoracic aorta   is normal in caliber with no evidence of dissection or aneurysm. The   lungs are clear.   2. Left breast cyst.       This report was signed and finalized on 11/3/2024 9:03 PM by Dr. Tera Ward MD.          CT Abdomen Pelvis With Contrast   Final Result   1. The patient has undergone recent hysterectomy. There is a   postoperative abscess noted at the level of the apex of the vaginal cuff   containing some air. This shows interval improvement from the previous   examination of 10/24/2024. There is  persistent inflammatory stranding   within the pelvis. There is also air within the urinary bladder with   mild enhancement of the bladder wall and stranding in the perivesical   space. Correlation is recommended with the patient's urinalysis. No free   fluid in the pelvis.   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   3. No evidence of nephrolithiasis or obstructive uropathy. No free   flowing effusion present within the abdomen or pelvis.   4. Constipation with increased stool within the right and transverse   colon. No mechanical bowel obstruction..               This report was signed and finalized on 11/3/2024 8:21 PM by Dr. Tera Ward MD.          XR Chest 1 View   Final Result   Impression: No evidence of acute cardiopulmonary disease.       This report was signed and finalized on 11/3/2024 7:31 PM by Dr. Tera Ward MD.                  ED Course  ED Course as of 11/03/24 2138   [place] Nov 03, 2024 2055 Case has been discussed with on-call general surgeon, Dr. Paniagua.  Plan to call GYN to discuss further plan of care. [KR]   2111 Case discussed with Dr. Mcclain, GYN on call.  Plan to return my call after discussing with Dr. Goldstein. [KR]   2136 Case discussed with Dr. Mcclain. Plan to admit to GYN service for IV antibiotics. IV  Zosyn given. Patient agreeable to plan. [KR]      ED Course User Index  [KR] Krystyna Sánchez APRN                                               Medical Decision Making      Final diagnoses:   Postoperative abscess   Fever, unspecified fever cause   History of hysterectomy       ED Disposition  ED Disposition       ED Disposition   Decision to Admit    Condition   --    Comment   Level of Care: Mother/Baby [15]   Diagnosis: Postoperative abscess [[id number]]   Admitting Physician: OZZY ASHLEY [[id number]]                 No follow-up provider specified.       Medication List      No changes were made to your prescriptions during this visit.            Krystyan Sánchez APRN  11/03/24 2139      Electronically signed by Carlos Herman MD at 11/03/24 2224       Vital Signs (last 2 days)       Date/Time Temp Temp src Pulse Resp BP Patient Position SpO2    11/05/24 0727 98.7 (37.1) Oral 93 16 109/59 Sitting 100    11/05/24 0700 99.8 (37.7) Axillary -- -- -- -- --    11/05/24 0608 100.2 (37.9) Axillary -- -- -- -- --    11/05/24 0406 98.6 (37) Axillary 100 18 114/64 Lying 100    11/05/24 0030 97.4 (36.3) Axillary 80 20 94/61 Sitting 98    11/04/24 2150 99.2 (37.3) Axillary -- -- -- -- --    11/04/24 2035 100.8 (38.2) Axillary -- -- -- -- --    11/04/24 2000 101.9 (38.8) Axillary -- -- -- -- --    11/04/24 1924 -- -- 123 20 134/83 Lying 100    11/04/24 1911 101.9 (38.8) Axillary -- -- -- -- --    11/04/24 1846 99.6 (37.6) Oral -- -- -- -- --    11/04/24 1824 98.6 (37) Oral -- -- -- -- --    11/04/24 1530 98.1 (36.7) Oral 94 18 109/74 Lying 98    11/04/24 1153 98 (36.7) Oral 90 18 113/62 Lying 99    11/04/24 0800 98.2 (36.8) Oral 78 16 104/71 Sitting 98    11/04/24 0540 98.2 (36.8) Temporal 75 18 99/61 Sitting 100    11/03/24 2348 98.6 (37) Temporal 83 18 110/63 Sitting 99    11/03/24 2215 98.5 (36.9) Oral 93 18 125/76 Sitting 98    11/03/24 2146 -- -- 95 -- 132/78 -- 100    11/03/24 2131 -- -- 100 -- 153/88 -- 99    11/03/24 2116 -- --  111 -- 132/96 -- 100    24 -- -- 94 -- 129/79 -- 99    24 -- -- 90 -- 118/73 -- 98    24 1904 99.2 (37.3) Oral 101 18 125/74 Sitting 97          Current Facility-Administered Medications   Medication Dose Route Frequency Provider Last Rate Last Admin    acetaminophen (TYLENOL) tablet 650 mg  650 mg Oral Q4H PRN Lance Mcclain DO   650 mg at 24    cyclobenzaprine (FLEXERIL) tablet 10 mg  10 mg Oral Q8H PRN Michelle Larson APRN        docusate sodium (COLACE) capsule 100 mg  100 mg Oral BID Kathy Marie MD   100 mg at 24    heparin (porcine) 5000 UNIT/ML injection 5,000 Units  5,000 Units Subcutaneous Q12H Kathy Marie MD   5,000 Units at 24    ibuprofen (ADVIL,MOTRIN) tablet 600 mg  600 mg Oral Q6H PRN Lance Mcclain DO   600 mg at 24 0610    ondansetron ODT (ZOFRAN-ODT) disintegrating tablet 4 mg  4 mg Oral Q6H PRN Lance Mcclain DO        Or    ondansetron (ZOFRAN) injection 4 mg  4 mg Intravenous Q6H PRN Lance Mcclain DO   4 mg at 242    oxyCODONE (ROXICODONE) immediate release tablet 5 mg  5 mg Oral Q4H PRN Michelle Larson APRN   5 mg at 246    piperacillin-tazobactam (ZOSYN) 3.375 g IVPB in 100 mL NS MBP (CD)  3.375 g Intravenous Q8H Lance Mcclain DO   3.375 g at 24 0244    sodium chloride 0.9 % flush 10 mL  10 mL Intravenous PRN Krystyna Sánchez APRN        sodium chloride 0.9 % flush 10 mL  10 mL Intravenous Q12H Lance Mcclain DO        sodium chloride 0.9 % infusion 40 mL  40 mL Intravenous PRN Lance Mcclain DO        sodium chloride 0.9 % infusion  125 mL/hr Intravenous Continuous Lance Mcclain  mL/hr at 24 0216 125 mL/hr at 24 0216        Physician Progress Notes (last 48 hours)        Kathy Marie MD at 24 0713           Sandra Alegria  : 1980  MRN: [id number]  CSN: [id number]    Post-operative Day #15 from TOTAL  LAPAROSCOPIC HYSTERECTOMY with BSO, DaVinci, and POD #12 return to OR for wash-out of pelvic abscess    Hospital Day #3 for this readmission    Subjective   Medications being utilized for pain control: acetaminophen and ibuprofen (OTC)  and oxycodone.  Patient returned to the emergency room Sunday when she had fevers, chills, and nausea.  During the patient's previous hospitalization she had Zosyn every 8 hours for several days.  She was then discharged home on Augmentin and just completed that antibiotic 3 days prior to coming back.  The patient says she was doing well, with just typical postop soreness, until Sunday.  Yesterday morning the patient looked good, was reporting normal bladder and bowel function, and had been afebrile since admission.  This morning, however, the patient has not had a fever several times during the night; Aubree reports that she feels clammy, with myalgias.  Overall, the patient was feeling well yesterday, and today feels unwell.  She still reports normal bladder and bowel function.  Patient experiencing intermittent nausea and required Zofran last evening.    A chest x-ray at the time of admission was negative for any findings.  UA was negative for any sign of infection.    CT Angiogram Chest    Result Date: 11/3/2024  1. No evidence of pulmonary thromboembolic disease. The thoracic aorta is normal in caliber with no evidence of dissection or aneurysm. The lungs are clear. 2. Left breast cyst.  This report was signed and finalized on 11/3/2024 9:03 PM by Dr. Tera Ward MD.      CT Abdomen Pelvis With Contrast    Result Date: 11/3/2024  1. The patient has undergone recent hysterectomy. There is a postoperative abscess noted at the level of the apex of the vaginal cuff containing some air. This shows interval improvement from the previous examination of 10/24/2024. There is  persistent inflammatory stranding within the pelvis. There is also air within the urinary bladder with mild  enhancement of the bladder wall and stranding in the perivesical space. Correlation is recommended with the patient's urinalysis. No free fluid in the pelvis. 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 3. No evidence of nephrolithiasis or obstructive uropathy. No free flowing effusion present within the abdomen or pelvis. 4. Constipation with increased stool within the right and transverse colon. No mechanical bowel obstruction..    This report was signed and finalized on 11/3/2024 8:21 PM by Dr. Tera Ward MD.      XR Chest 1 View    Result Date: 11/3/2024  Impression: No evidence of acute cardiopulmonary disease.  This report was signed and finalized on 11/3/2024 7:31 PM by Dr. Tera Ward MD.          Objective     Min/max vitals past 24 hours:   Temp  Min: 97.4 °F (36.3 °C)  Max: 101.9 °F (38.8 °C)  BP  Min: 94/61  Max: 134/83  Pulse  Min: 78  Max: 123  Resp  Min: 16  Max: 20        I/O last 3 completed shifts:  In: 1100 [IV Piggyback:1100]  Out: 1200 [Urine:1200]    General: well developed; well nourished  no acute distress   Lungs:  Abdomen: breathing is unlabored  soft, with generalized tenderness; no masses  incisions are healing, clean, dry, intact, and without drainage   Pelvic: Not performed   Ext: Calves NT     WBC   Date/Time Value Ref Range Status   11/05/2024 0527 13.97 (H) 3.40 - 10.80 10*3/mm3 Final   11/02/2023 1500 4.98 3.40 - 10.80 10*3/mm3 Final     Hemoglobin   Date/Time Value Ref Range Status   11/05/2024 0527 7.5 (L) 12.0 - 15.9 g/dL Final     Hematocrit   Date/Time Value Ref Range Status   11/05/2024 0527 24.7 (L) 34.0 - 46.6 % Final     Platelets   Date/Time Value Ref Range Status   11/05/2024 0527 457 (H) 140 - 450 10*3/mm3 Final       Assessment   Post-op Day #15 and 12 S/P DaVinci TOTAL LAPAROSCOPIC HYSTERECTOMY with BSO, followed a few days later by return to OR for pelvic wash-out and adhesiolysis  Patient not feeling well this morning, condition has declined since  yesterday.  She has also now had a fever several times since admission  WBCs elevated at 18.7 on admission.  WBCs 13.6 yesterday and 13.97 today.  Fever up to 101.9 last evening  Plan   I do not plan to take the patient back to the operating room today.  We have discussed the possibility of septic pelvic thrombophlebitis as a contributing factor.  Because thrombosis was noted in the right gonadal vein on CT, twice daily heparin was started yesterday.  We have discussed SPT.  If this is a contributing factor, the patient is already on the appropriate treatment, which is antibiotics plus anticoagulation.  The patient is very reasonably frustrated and wants to do what ever will help her get better the fastest.  We have agreed to consult interventional radiology today (while continuing antibiotics and heparin) for possible IR drainage of the 4 x 6 cm cuff abscess.  If the radiologist does not think it is amenable to IR drainage, will make patient n.p.o. after midnight with consideration for possible return to the OR tomorrow to open up the vaginal cuff.  General Surgery following and I appreciate their input.  Patient on Zosyn 0.375 g IV every 8 hours  Zofran prn for nausua  Heparin bid added on 24, since there was thrombosis noted in the right gonadal vein CT  Also added Colace bid yesterday since the CT notes patient to be constipated    Kathy Marie MD  2024  07:19 CST            Electronically signed by Kathy Marie MD at 24 0728       Kathy Marie MD at 24 0743           Sandra Alegria  : 1980  MRN: [id number]  CSN: [id number]    Post-operative Day #14 from TOTAL LAPAROSCOPIC HYSTERECTOMY with BSO, DaVinci, and POD #11 return to OR for wash-out of pelvic abscess  Subjective   Medications being utilized for pain control: acetaminophen and ibuprofen (OTC).  Patient return to the emergency room last night when she had fevers, chills, and nausea.  During the patient's  previous hospitalization she had Zosyn every 8 hours for several days.  She was then discharged home on Augmentin and just completed that antibiotic 3 days ago.  The patient says she was doing well, with just typical postop soreness, until yesterday.  At present, the patient reports most of her discomfort is actually in her flank back, although urinalysis in the emergency room was completely negative.    A chest x-ray at the time of admission was negative for any findings.    CT Angiogram Chest    Result Date: 11/3/2024  1. No evidence of pulmonary thromboembolic disease. The thoracic aorta is normal in caliber with no evidence of dissection or aneurysm. The lungs are clear. 2. Left breast cyst.  This report was signed and finalized on 11/3/2024 9:03 PM by Dr. Tera Ward MD.      CT Abdomen Pelvis With Contrast    Result Date: 11/3/2024  1. The patient has undergone recent hysterectomy. There is a postoperative abscess noted at the level of the apex of the vaginal cuff containing some air. This shows interval improvement from the previous examination of 10/24/2024. There is  persistent inflammatory stranding within the pelvis. There is also air within the urinary bladder with mild enhancement of the bladder wall and stranding in the perivesical space. Correlation is recommended with the patient's urinalysis. No free fluid in the pelvis. 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 3. No evidence of nephrolithiasis or obstructive uropathy. No free flowing effusion present within the abdomen or pelvis. 4. Constipation with increased stool within the right and transverse colon. No mechanical bowel obstruction..    This report was signed and finalized on 11/3/2024 8:21 PM by Dr. Tera Ward MD.      XR Chest 1 View    Result Date: 11/3/2024  Impression: No evidence of acute cardiopulmonary disease.  This report was signed and finalized on 11/3/2024 7:31 PM by Dr. Tera Ward MD.          Objective      Min/max vitals past 24 hours:   Temp  Min: 98.2 °F (36.8 °C)  Max: 99.2 °F (37.3 °C)  BP  Min: 99/61  Max: 153/88  Pulse  Min: 75  Max: 111  Resp  Min: 18  Max: 18        I/O last 3 completed shifts:  In: 1100 [IV Piggyback:1100]  Out: -     General: well developed; well nourished  no acute distress   Lungs:  Abdomen: breathing is unlabored  soft, mildly-tender in RLQ; no masses  incisions are healing, clean, dry, intact, and without drainage   Pelvic: Not performed   Ext: Calves NT     WBC   Date/Time Value Ref Range Status   11/03/2024 2043 18.72 (H) 3.40 - 10.80 10*3/mm3 Final   11/02/2023 1500 4.98 3.40 - 10.80 10*3/mm3 Final     Hemoglobin   Date/Time Value Ref Range Status   11/03/2024 2043 9.1 (L) 12.0 - 15.9 g/dL Final     Hematocrit   Date/Time Value Ref Range Status   11/03/2024 2043 29.5 (L) 34.0 - 46.6 % Final     Platelets   Date/Time Value Ref Range Status   11/03/2024 2043 633 (H) 140 - 450 10*3/mm3 Final       Assessment   Post-op Day #14 and 11 S/P DaVinci TOTAL LAPAROSCOPIC HYSTERECTOMY with BSO, followed a few days later by return to OR for pelvic wash-out and adhesiolysis  Although the patient has been afebrile since arrival to the hospital, she reports a fever of 101.5 yesterday at home.  She is also ejective fever/chills    WBCs elevated at 18.7 on admission.  The patient's previous antibiotic therapy seems like it should have certainly been adequate, especially after pelvic washout with antibiotic fluid.  Chest x-ray negative.  Urinalysis negative.    Plan   I do not plan to take the patient back to the operating room at this time.  Okay for regular diet  General Surgery being advised that patient is back in the hospital; she has requested that they be consulted  Patient back on Zosyn 0.375 g IV every 8 hours  Heparin added twice daily since there was thrombosis noted in the right gonadal vein CT  Also adding Colace twice daily since the CT notes patient to be constipated    Kathy Marie  "MD  2024  07:43 CST            Electronically signed by Kathy Marie MD at 24 0948          Consult Notes (last 48 hours)        Goran Rdz MD at 24 1356        Consult Orders    1. Inpatient General Surgery Consult [[id number]] ordered by Kathy Marie MD at 24 0918                 General Surgery     Referring Provider: No ref. provider found    Patient Care Team:  Rashawn Mcgovern MD as PCP - General (Family Medicine)    Subjective      Chief complaint: Fever and abdominal pain    History of present illness:    Aubree Alegria yesterday was admitted due to complaints of fevers and abdominal pain.  She is status post laparoscopic procedure with evacuation/washout of pelvic abscess.  She stated she had been doing well however 2 days ago she began running a fever and feeling \"ill\".  She was admitted due to findings of a smaller cuff abscess as well as leukocytosis.  Today the patient states she is feeling much better.  She denies abdominal pain or fevers or chills.  She has been placed on Zosyn since yesterday.    Review of Systems:  Review of Systems - General ROS: positive for  - fever  Respiratory ROS: no cough, shortness of breath, or wheezing  Cardiovascular ROS: no chest pain or dyspnea on exertion  Gastrointestinal ROS: positive for - abdominal pain and constipation     History  Past Medical History:   Diagnosis Date    Spinal headache    ,   Past Surgical History:   Procedure Laterality Date    APPENDECTOMY      BREAST BIOPSY Bilateral     benign     SECTION      x2    DIAGNOSTIC LAPAROSCOPY N/A 10/24/2024    Procedure: DIAGNOSTIC LAPAROSCOPY WITH DAVINCI ROBOT;  Surgeon: Sami Villalobos MD;  Location: [facility] OR;  Service: Obstetrics/Gynecology;  Laterality: N/A;    DIAGNOSTIC LAPAROSCOPY N/A 10/24/2024    Procedure: DIAGNOSTIC LAPAROSCOPY WITH DAVINCI ROBOT;  Surgeon: Goran Rdz MD;  Location:  [facility] OR;  Service: General;  Laterality: N/A;    ECTOPIC " PREGNANCY      TOTAL LAPAROSCOPIC HYSTERECTOMY SALPINGO OOPHORECTOMY Bilateral 10/21/2024    Procedure: TOTAL LAPAROSCOPIC HYSTERECTOMY BILATERAL SALPINGOOPHORECTOMY WITH DAVINCI ROBOT;  Surgeon: Kathy Marie MD;  Location: [facility];  Service: Robotics - DaVinci;  Laterality: Bilateral;    TUBAL ABDOMINAL LIGATION      WISDOM TOOTH EXTRACTION     ,   Family History   Problem Relation Age of Onset    Kidney cancer Maternal Aunt     Hypertension Father     No Known Problems Mother     No Known Problems Sister     Leukemia Paternal Grandmother     Breast cancer Paternal Aunt 40    Ovarian cancer Neg Hx     Uterine cancer Neg Hx     Colon cancer Neg Hx     Melanoma Neg Hx     Prostate cancer Neg Hx    ,   Social History     Tobacco Use    Smoking status: Some Days     Types: Cigarettes     Passive exposure: Current    Smokeless tobacco: Never   Vaping Use    Vaping status: Never Used   Substance Use Topics    Alcohol use: Never    Drug use: Never   ,   Medications Prior to Admission   Medication Sig Dispense Refill Last Dose/Taking    acetaminophen (TYLENOL) 500 MG tablet Take 1 tablet by mouth Every 6 (Six) Hours As Needed for Mild Pain for up to 20 days. 60 tablet 0     naloxone (NARCAN) 4 MG/0.1ML nasal spray Call 911. Don't prime. [place] in 1 nostril for overdose. Repeat in 2-3 minutes in other nostril if no or minimal breathing/responsiveness. (Patient not taking: Reported on 10/30/2024) 2 each 0     ondansetron ODT (ZOFRAN-ODT) 4 MG disintegrating tablet Place 1 tablet on the tongue Every 8 (Eight) Hours As Needed for Nausea or Vomiting. 30 tablet 1     [] oxyCODONE (ROXICODONE) 5 MG immediate release tablet Take 1 tablet by mouth Every 8 (Eight) Hours As Needed for Moderate Pain for up to 7 days. 20 tablet 0     potassium chloride (KLOR-CON M20) 20 MEQ CR tablet Take 1 tablet by mouth 2 (Two) Times a Day. (Patient not taking: Reported on 10/30/2024) 6 tablet 0     and Allergies:  Ketorolac tromethamine  and Tramadol    Current Facility-Administered Medications:     acetaminophen (TYLENOL) tablet 650 mg, 650 mg, Oral, Q4H PRN, Lance Mcclain DO, 650 mg at 11/04/24 1224    docusate sodium (COLACE) capsule 100 mg, 100 mg, Oral, BID, Kathy Marie MD, 100 mg at 11/04/24 1017    heparin (porcine) 5000 UNIT/ML injection 5,000 Units, 5,000 Units, Subcutaneous, Q12H, Kathy Marie MD, 5,000 Units at 11/04/24 1222    ibuprofen (ADVIL,MOTRIN) tablet 600 mg, 600 mg, Oral, Q6H PRN, Lance Mcclain DO, 600 mg at 11/04/24 0747    ondansetron ODT (ZOFRAN-ODT) disintegrating tablet 4 mg, 4 mg, Oral, Q6H PRN **OR** ondansetron (ZOFRAN) injection 4 mg, 4 mg, Intravenous, Q6H PRN, Lance Mcclain DO    piperacillin-tazobactam (ZOSYN) 3.375 g IVPB in 100 mL NS MBP (CD), 3.375 g, Intravenous, Q8H, Lance Mcclain DO, 3.375 g at 11/04/24 1100    [COMPLETED] Insert Peripheral IV, , , Once **AND** sodium chloride 0.9 % flush 10 mL, 10 mL, Intravenous, PRN, Krystyna Sánchez, APRN    sodium chloride 0.9 % flush 10 mL, 10 mL, Intravenous, Q12H, Lacne Mcclain DO    sodium chloride 0.9 % infusion 40 mL, 40 mL, Intravenous, PRN, Lance Mcclain DO    sodium chloride 0.9 % infusion, 125 mL/hr, Intravenous, Continuous, Lance Mcclain DO, Last Rate: 125 mL/hr at 11/04/24 1100, 125 mL/hr at 11/04/24 1100    Objective     Vital Signs   Temp:  [98 °F (36.7 °C)-99.2 °F (37.3 °C)] 98 °F (36.7 °C)  Heart Rate:  [] 90  Resp:  [16-18] 18  BP: ()/(61-96) 113/62    Physical Exam:  Physical Exam  Constitutional:       General: She is not in acute distress.     Appearance: Normal appearance. She is normal weight. She is not ill-appearing.   Cardiovascular:      Rate and Rhythm: Normal rate and regular rhythm.   Pulmonary:      Effort: Pulmonary effort is normal. No respiratory distress.      Breath sounds: Normal breath sounds. No wheezing.   Abdominal:      General: Bowel sounds are normal.      Palpations: Abdomen  is soft.      Tenderness: There is no abdominal tenderness.      Comments: All wound sites are clean dry and intact.   Neurological:      Mental Status: She is alert.          Results Review:    Lab Results (last 24 hours)       Procedure Component Value Units Date/Time    CBC & Differential [[id number]]  (Abnormal) Collected: 11/04/24 0746    Specimen: Blood Updated: 11/04/24 0752    Narrative:      The following orders were created for panel order CBC & Differential.  Procedure                               Abnormality         Status                     ---------                               -----------         ------                     CBC Auto Differential[[id number]]        Abnormal            Final result                 Please view results for these tests on the individual orders.    CBC Auto Differential [[id number]]  (Abnormal) Collected: 11/04/24 0746    Specimen: Blood Updated: 11/04/24 0752     WBC 13.60 10*3/mm3      RBC 3.57 10*6/mm3      Hemoglobin 8.1 g/dL      Hematocrit 27.2 %      MCV 76.2 fL      MCH 22.7 pg      MCHC 29.8 g/dL      RDW 17.9 %      RDW-SD 49.4 fl      MPV 10.2 fL      Platelets 503 10*3/mm3      Neutrophil % 78.9 %      Lymphocyte % 13.9 %      Monocyte % 5.3 %      Eosinophil % 0.7 %      Basophil % 0.4 %      Immature Grans % 0.8 %      Neutrophils, Absolute 10.72 10*3/mm3      Lymphocytes, Absolute 1.89 10*3/mm3      Monocytes, Absolute 0.72 10*3/mm3      Eosinophils, Absolute 0.10 10*3/mm3      Basophils, Absolute 0.06 10*3/mm3      Immature Grans, Absolute 0.11 10*3/mm3      nRBC 0.0 /100 WBC     Respiratory Panel PCR w/COVID-19(SARS-CoV-2) [facility] In-House, NP Swab in UTM/VTM, 2 HR TAT - Swab, Nasopharynx [[id number]]  (Normal) Collected: 11/03/24 1952    Specimen: Swab from Nasopharynx Updated: 11/03/24 2057     ADENOVIRUS, PCR Not Detected     Coronavirus 229E Not Detected     Coronavirus HKU1 Not Detected     Coronavirus NL63 Not Detected     Coronavirus  OC43 Not Detected     COVID19 Not Detected     Human Metapneumovirus Not Detected     Human Rhinovirus/Enterovirus Not Detected     Influenza A PCR Not Detected     Influenza B PCR Not Detected     Parainfluenza Virus 1 Not Detected     Parainfluenza Virus 2 Not Detected     Parainfluenza Virus 3 Not Detected     Parainfluenza Virus 4 Not Detected     RSV, PCR Not Detected     Bordetella pertussis pcr Not Detected     Bordetella parapertussis PCR Not Detected     Chlamydophila pneumoniae PCR Not Detected     Mycoplasma pneumo by PCR Not Detected    Narrative:      In the setting of a positive respiratory panel with a viral infection PLUS a negative procalcitonin without other underlying concern for bacterial infection, consider observing off antibiotics or discontinuation of antibiotics and continue supportive care. If the respiratory panel is positive for atypical bacterial infection (Bordetella pertussis, Chlamydophila pneumoniae, or Mycoplasma pneumoniae), consider antibiotic de-escalation to target atypical bacterial infection.    CBC & Differential [[id number]]  (Abnormal) Collected: 11/03/24 2043    Specimen: Blood Updated: 11/03/24 2050    Narrative:      The following orders were created for panel order CBC & Differential.  Procedure                               Abnormality         Status                     ---------                               -----------         ------                     CBC Auto Differential[[id number]]        Abnormal            Final result                 Please view results for these tests on the individual orders.    CBC Auto Differential [[id number]]  (Abnormal) Collected: 11/03/24 2043    Specimen: Blood Updated: 11/03/24 2050     WBC 18.72 10*3/mm3      RBC 3.93 10*6/mm3      Hemoglobin 9.1 g/dL      Hematocrit 29.5 %      MCV 75.1 fL      MCH 23.2 pg      MCHC 30.8 g/dL      RDW 18.0 %      RDW-SD 48.7 fl      MPV 10.5 fL      Platelets 633 10*3/mm3      Neutrophil % 80.6 %      " Lymphocyte % 12.7 %      Monocyte % 4.6 %      Eosinophil % 0.2 %      Basophil % 0.4 %      Immature Grans % 1.5 %      Neutrophils, Absolute 15.09 10*3/mm3      Lymphocytes, Absolute 2.38 10*3/mm3      Monocytes, Absolute 0.87 10*3/mm3      Eosinophils, Absolute 0.03 10*3/mm3      Basophils, Absolute 0.07 10*3/mm3      Immature Grans, Absolute 0.28 10*3/mm3      nRBC 0.0 /100 WBC     Blood Culture - Blood, Arm, Right [[id number]] Collected: 11/03/24 2039    Specimen: Blood from Arm, Right Updated: 11/03/24 2049    Procalcitonin [[id number]]  (Normal) Collected: 11/03/24 1943    Specimen: Blood Updated: 11/03/24 2032     Procalcitonin 0.08 ng/mL     Narrative:      As a Marker for Sepsis (Non-Neonates):    1. <0.5 ng/mL represents a low risk of severe sepsis and/or septic shock.  2. >2 ng/mL represents a high risk of severe sepsis and/or septic shock.    As a Marker for Lower Respiratory Tract Infections that require antibiotic therapy:    PCT on Admission    Antibiotic Therapy       6-12 Hrs later    >0.5                Strongly Recommended  >0.25 - <0.5        Recommended   0.1 - 0.25          Discouraged              Remeasure/reassess PCT  <0.1                Strongly Discouraged     Remeasure/reassess PCT    As 28 day mortality risk marker: \"Change in Procalcitonin Result\" (>80% or <=80%) if Day 0 (or Day 1) and Day 4 values are available. Refer to http://www.[facility]-pct-calculator.com    Change in PCT <=80%  A decrease of PCT levels below or equal to 80% defines a positive change in PCT test result representing a higher risk for 28-day all-cause mortality of patients diagnosed with severe sepsis for septic shock.    Change in PCT >80%  A decrease of PCT levels of more than 80% defines a negative change in PCT result representing a lower risk for 28-day all-cause mortality of patients diagnosed with severe sepsis or septic shock.       C-reactive Protein [[id number]]  (Abnormal) Collected: 11/03/24 1943    " Specimen: Blood Updated: 11/03/24 2028     C-Reactive Protein 5.54 mg/dL     Comprehensive Metabolic Panel [[id number]]  (Abnormal) Collected: 11/03/24 1943    Specimen: Blood Updated: 11/03/24 2026     Glucose 113 mg/dL      BUN 12 mg/dL      Creatinine 0.62 mg/dL      Sodium 139 mmol/L      Potassium 4.1 mmol/L      Chloride 104 mmol/L      CO2 25.0 mmol/L      Calcium 8.6 mg/dL      Total Protein 7.5 g/dL      Albumin 3.9 g/dL      ALT (SGPT) 32 U/L      AST (SGOT) 18 U/L      Alkaline Phosphatase 70 U/L      Total Bilirubin 0.2 mg/dL      Globulin 3.6 gm/dL      A/G Ratio 1.1 g/dL      BUN/Creatinine Ratio 19.4     Anion Gap 10.0 mmol/L      eGFR 111.4 mL/min/1.73     Narrative:      GFR Normal >60  Chronic Kidney Disease <60  Kidney Failure <15      Urinalysis With Culture If Indicated - Urine, Clean Catch [[id number]]  (Normal) Collected: 11/03/24 2010    Specimen: Urine, Clean Catch Updated: 11/03/24 2026     Color, UA Yellow     Appearance, UA Clear     pH, UA 6.0     Specific Gravity, UA 1.022     Glucose, UA Negative     Ketones, UA Negative     Bilirubin, UA Negative     Blood, UA Negative     Protein, UA Negative     Leuk Esterase, UA Negative     Nitrite, UA Negative     Urobilinogen, UA 0.2 E.U./dL    Narrative:      In absence of clinical symptoms, the presence of pyuria, bacteria, and/or nitrites on the urinalysis result does not correlate with infection.  Urine microscopic not indicated.    Lipase [[id number]]  (Normal) Collected: 11/03/24 1943    Specimen: Blood Updated: 11/03/24 2021     Lipase 28 U/L     Lactic Acid, Plasma [[id number]]  (Normal) Collected: 11/03/24 1943    Specimen: Blood Updated: 11/03/24 2020     Lactate 1.2 mmol/L     Blood Culture - Blood, Arm, Left [[id number]] Collected: 11/03/24 1943    Specimen: Blood from Arm, Left Updated: 11/03/24 2019    D-dimer, Quantitative [[id number]]  (Abnormal) Collected: 11/03/24 1943    Specimen: Blood Updated: 11/03/24 2012     D-Dimer,  "Quantitative 2.75 MCGFEU/mL     Narrative:      According to the assay 's published package insert, a normal (<0.50 MCGFEU/mL) D-dimer result in conjunction with a non-high clinical probability assessment, excludes deep vein thrombosis (DVT) and pulmonary embolism (PE) with high sensitivity.    D-dimer values increase with age and this can make VTE exclusion of an older population difficult. To address this, the [employer], based on best available evidence and recent guidelines, recommends that clinicians use age-adjusted D-dimer thresholds in patients greater than 50 years of age with: a) a low probability of PE who do not meet all Pulmonary Embolism Rule Out Criteria, or b) in those with intermediate probability of PE.   The formula for an age-adjusted D-dimer cut-off is \"age/100\".  For example, a 60 year old patient would have an age-adjusted cut-off of 0.60 MCGFEU/mL and an 80 year old 0.80 MCGFEU/mL.    Sedimentation Rate [[id number]]  (Abnormal) Collected: 11/03/24 1943    Specimen: Blood Updated: 11/03/24 2010     Sed Rate 116 mm/hr           Imaging Results (Last 24 Hours)       Procedure Component Value Units Date/Time    CT Angiogram Chest [[id number]] Collected: 11/03/24 2058     Updated: 11/03/24 2106    Narrative:      Exam: CT angiogram of the of the chest with intravenous contrast.  11/3/2024     CLINICAL HISTORY: Recent surgery. Right-sided pleuritic chest pain.  Elevated D-dimer.     DOSE:  304.51 mGy.cm . All CT scans are performed using dose  optimization techniques as appropriate to the performed exam and  including at least one of the following: Automated exposure control,  adjustment of the mA and/or kV according to size, and the use of the  iterative reconstruction technique.     TECHNIQUE: Contiguous axial images were obtained through the thorax  following intravenous contrast administration with reformatted images  obtained in the sagittal and coronal " projections from the original data  set. Three-dimensional reconstructions are also obtained.     COMPARISON: None     FINDINGS:     Pulmonary arteries:  There is normal enhancement of the pulmonary  arteries with no evidence of pulmonary thromboembolic disease.     Aorta:  The thoracic aorta and proximal great vessels are normal in  appearance. There is no evidence of aortic dissection or aneurysm.     LUNGS: Biapical fibronodular changes are present. These are symmetric.  The lungs are otherwise clear. No consolidative pneumonia or effusion.     Pleural spaces: Unremarkable. No evidence of pleural effusion or  pneumothorax.     HEART: No evidence of enlargement. No coronary artery calcifications are  present. There is no evidence of pericardial effusion. No evidence of RV  dysfunction.     Bones: No acute osseous abnormalities are demonstrated.     CHEST WALL: There is asymmetry within the left breast which measures  fluid density. I would favor a benign cyst within the left breast. This  was confirmed on previous diagnostic mammography and ultrasound in 2022.  No additional chest wall abnormalities present.     Lymph nodes: No enlarged mediastinal or axillary lymphadenopathy is  present.     Upper abdomen: Limited images of the upper abdomen are unremarkable.       Impression:      1. No evidence of pulmonary thromboembolic disease. The thoracic aorta  is normal in caliber with no evidence of dissection or aneurysm. The  lungs are clear.  2. Left breast cyst.     This report was signed and finalized on 11/3/2024 9:03 PM by Dr. Tera Ward MD.       CT Abdomen Pelvis With Contrast [[id number]] Collected: 11/03/24 2009     Updated: 11/03/24 2024    Narrative:      CT ABDOMEN PELVIS W CONTRAST- 11/3/2024 6:57 PM     HISTORY: recent hysterectomy with postoperative abscess       COMPARISON: Outside CT dated 10/24/2024.     DLP: 271.98 mGy.cm. All CT scans are performed using dose optimization  techniques as  appropriate to the performed exam and including at least  one of the following: Automated exposure control, adjustment of the mA  and/or kV according to size, and the use of the iterative reconstruction  technique.     TECHNIQUE: Following the intravenous administration of contrast, helical  CT tomographic images of the abdomen and pelvis were acquired. Coronal  reformatted images were also provided for review.     FINDINGS:  The lung bases and base of the heart are unremarkable.     LIVER: No focal liver lesion. The hepatic vasculature is patent.     BILIARY SYSTEM: The gallbladder is unremarkable. No intrahepatic or  extrahepatic ductal dilatation.     PANCREAS: No focal pancreatic lesion.     SPLEEN: Unremarkable.     KIDNEYS AND ADRENALS: Bilateral kidneys and adrenal glands are  unremarkable. The ureters are decompressed and normal in appearance.     RETROPERITONEUM: No mass, lymphadenopathy or hemorrhage.     GI TRACT: There is constipation with increased stool within the right  and transverse colon. The small bowel is normal in distribution and  appearance. No evidence of mechanical obstruction. No gastric wall  thickening or gastric outlet obstruction.. The appendix is surgically  absent..     OTHER: No mesenteric mass is demonstrated. No evidence of adenopathy in  the small bowel mesentery.. 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 This is new from the previous examination. The patient has  undergone recent hysterectomy.. The osseous structures and soft tissues  demonstrate no worrisome lesions. A fat-containing periumbilical hernia  is present.     PELVIS: The patient has undergone hysterectomy. There is a small volume  of air within the urinary bladder. There is mild stranding in the  perivesical space. At the apex of the vaginal cuff there is a fluid  collection containing some air which measures approximately 6.4 x 4.4 cm  in size consistent with a postoperative abscess. This shows  significant  interval improvement from the previous examination of 10/24/2024 at  which time this collection measured 11.0 x 6.7 cm in size. There is   residual inflammatory stranding within the pelvic fat.. No significant  bladder wall thickening is present..       Impression:      1. The patient has undergone recent hysterectomy. There is a  postoperative abscess noted at the level of the apex of the vaginal cuff  containing some air. This shows interval improvement from the previous  examination of 10/24/2024. There is  persistent inflammatory stranding  within the pelvis. There is also air within the urinary bladder with  mild enhancement of the bladder wall and stranding in the perivesical  space. Correlation is recommended with the patient's urinalysis. No free  fluid in the pelvis.  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  3. No evidence of nephrolithiasis or obstructive uropathy. No free  flowing effusion present within the abdomen or pelvis.  4. Constipation with increased stool within the right and transverse  colon. No mechanical bowel obstruction..           This report was signed and finalized on 11/3/2024 8:21 PM by Dr. Tera Ward MD.       XR Chest 1 View [[id number]] Collected: 11/03/24 1931     Updated: 11/03/24 1934    Narrative:      EXAMINATION: XR CHEST 1 VW-  11/3/2024 7:31 PM     Comparison: None available     HISTORY: Fever     One-view chest: Upright frontal projection of the chest is obtained. The  lungs are clear with no evidence of acute parenchymal  consolidation. No  pleural effusion or free air is observed. The  mediastinal contours are  within normal limits.                                                                                                                         Impression:      Impression: No evidence of acute cardiopulmonary disease.     This report was signed and finalized on 11/3/2024 7:31 PM by Dr. Tera Ward MD.                  ASSESSMENT/PLAN   Diagnosis Plan   1. Postoperative abscess        2. Fever, unspecified fever cause        3. History of hysterectomy           Plan: Current treatment is appropriate.  Patient is on IV antibiotics and may consider changing it to oral antibiotics due to her leukocytosis improving.  I would recommend assessing the trend of her leukocytosis with a.m. labs tomorrow.  No issues with her GI tract for she is tolerating p.o. and having bowel movements.  With respect to the postoperative abscess CT has shown improvement of size of this abscess.  I did explain to the patient that there is a possibility the fluid collection may increase in size and if so if becoming symptomatic with fevers, chills, increased abdominal pain this fluid collection may actually require drainage with interventional radiology assistance if amenable to this approach.  Currently she is doing quite well and progressing appropriately with her current treatment regimen.  And I do agree there is no surgical intervention necessary at this time.  I will reassess her in a.m. looking at these parameters discussed above.  I discussed with the patient her cultures and sensitivities which disclosed she was on the appropriate antibiotics for her current treatment.  Her IV Zosyn will also treat this form of bacteria.  Discussed plan with patient and family and addressed their questions and concerns to their satisfaction at this juncture.      Goran Rdz MD  11/04/24  13:56 CST            Electronically signed by Goran Rdz MD at 11/04/24 1407       Susan Balbuena APRN at 11/04/24 1352          General Surgery   History and Physical     Referring Provider: No ref. provider found    Patient Care Team:  Rashawn Mcgovern MD as PCP - General (Family Medicine)    Chief complaint: Fever, nausea, post op day #14 from DaVinci TOTAL LAPAROSCOPIC HYSTERECTOMY with BSO, followed a few days later by return to OR for pelvic wash-out  and adhesiolysis     Subjective      History of Present Illness      The patient is a 44 y.o. female who presented to the ER yesterday due to fever greater than 101, nausea and chills. Patient had a hysterectomy 14 days ago and returned for a wash out of a pelvic abscess. She was sent home and states she completed her Augmentin 3-4 days ago.  Patient presented with leukocytosis yesterday in the emergency room. CT revealed improvement of post operative abscess. She is currently on IV Zosyn and admits some nausea and has been afebrile since admission. She admits having 2-3 BMs daily.     Review of Systems    Review of Systems - General ROS: negative  ENT ROS: negative  Respiratory ROS: negative  Cardiovascular ROS: negative  Gastrointestinal ROS: no abdominal pain, change in bowel habits, or black or bloody stools    History  Past Medical History:   Diagnosis Date    Spinal headache    ,   Past Surgical History:   Procedure Laterality Date    APPENDECTOMY      BREAST BIOPSY Bilateral     benign     SECTION      x2    DIAGNOSTIC LAPAROSCOPY N/A 10/24/2024    Procedure: DIAGNOSTIC LAPAROSCOPY WITH DAVINCI ROBOT;  Surgeon: Sami Villalobos MD;  Location: [facility] OR;  Service: Obstetrics/Gynecology;  Laterality: N/A;    DIAGNOSTIC LAPAROSCOPY N/A 10/24/2024    Procedure: DIAGNOSTIC LAPAROSCOPY WITH DAVINCI ROBOT;  Surgeon: Goran Rdz MD;  Location: [facility] OR;  Service: General;  Laterality: N/A;    ECTOPIC PREGNANCY      TOTAL LAPAROSCOPIC HYSTERECTOMY SALPINGO OOPHORECTOMY Bilateral 10/21/2024    Procedure: TOTAL LAPAROSCOPIC HYSTERECTOMY BILATERAL SALPINGOOPHORECTOMY WITH DAVINCI ROBOT;  Surgeon: Kathy Marie MD;  Location: [facility] OR;  Service: Robotics - DaVinci;  Laterality: Bilateral;    TUBAL ABDOMINAL LIGATION      WISDOM TOOTH EXTRACTION     ,   Family History   Problem Relation Age of Onset    Kidney cancer Maternal Aunt     Hypertension Father     No Known Problems Mother     No Known Problems  Sister     Leukemia Paternal Grandmother     Breast cancer Paternal Aunt 40    Ovarian cancer Neg Hx     Uterine cancer Neg Hx     Colon cancer Neg Hx     Melanoma Neg Hx     Prostate cancer Neg Hx    ,   Social History     Tobacco Use    Smoking status: Some Days     Types: Cigarettes     Passive exposure: Current    Smokeless tobacco: Never   Vaping Use    Vaping status: Never Used   Substance Use Topics    Alcohol use: Never    Drug use: Never   ,   Medications Prior to Admission   Medication Sig Dispense Refill Last Dose/Taking    acetaminophen (TYLENOL) 500 MG tablet Take 1 tablet by mouth Every 6 (Six) Hours As Needed for Mild Pain for up to 20 days. 60 tablet 0     naloxone (NARCAN) 4 MG/0.1ML nasal spray Call 911. Don't prime. [place] in 1 nostril for overdose. Repeat in 2-3 minutes in other nostril if no or minimal breathing/responsiveness. (Patient not taking: Reported on 10/30/2024) 2 each 0     ondansetron ODT (ZOFRAN-ODT) 4 MG disintegrating tablet Place 1 tablet on the tongue Every 8 (Eight) Hours As Needed for Nausea or Vomiting. 30 tablet 1     [] oxyCODONE (ROXICODONE) 5 MG immediate release tablet Take 1 tablet by mouth Every 8 (Eight) Hours As Needed for Moderate Pain for up to 7 days. 20 tablet 0     potassium chloride (KLOR-CON M20) 20 MEQ CR tablet Take 1 tablet by mouth 2 (Two) Times a Day. (Patient not taking: Reported on 10/30/2024) 6 tablet 0     and Allergies:  Ketorolac tromethamine and Tramadol    Current Facility-Administered Medications:     acetaminophen (TYLENOL) tablet 650 mg, 650 mg, Oral, Q4H PRN, Lance Mcclain DO, 650 mg at 24 1224    docusate sodium (COLACE) capsule 100 mg, 100 mg, Oral, BID, Kathy Marie MD, 100 mg at 24 1017    heparin (porcine) 5000 UNIT/ML injection 5,000 Units, 5,000 Units, Subcutaneous, Q12H, Kathy Marie MD, 5,000 Units at 24 1222    ibuprofen (ADVIL,MOTRIN) tablet 600 mg, 600 mg, Oral, Q6H PRN, Lance Mcclain DO,  600 mg at 11/04/24 0747    ondansetron ODT (ZOFRAN-ODT) disintegrating tablet 4 mg, 4 mg, Oral, Q6H PRN **OR** ondansetron (ZOFRAN) injection 4 mg, 4 mg, Intravenous, Q6H PRN, Lance Mcclain DO    piperacillin-tazobactam (ZOSYN) 3.375 g IVPB in 100 mL NS MBP (CD), 3.375 g, Intravenous, Q8H, Lance Mcclain, , 3.375 g at 11/04/24 1100    [COMPLETED] Insert Peripheral IV, , , Once **AND** sodium chloride 0.9 % flush 10 mL, 10 mL, Intravenous, PRN, Krystyna Sánchez APRN    sodium chloride 0.9 % flush 10 mL, 10 mL, Intravenous, Q12H, Lance Mcclain DO    sodium chloride 0.9 % infusion 40 mL, 40 mL, Intravenous, PRN, Lance Mcclain DO    sodium chloride 0.9 % infusion, 125 mL/hr, Intravenous, Continuous, Lance Mcclain, , Last Rate: 125 mL/hr at 11/04/24 1100, 125 mL/hr at 11/04/24 1100    Objective     Vital Signs   Temp:  [98 °F (36.7 °C)-99.2 °F (37.3 °C)] 98 °F (36.7 °C)  Heart Rate:  [] 90  Resp:  [16-18] 18  BP: ()/(61-96) 113/62    Physical Exam:    Physical Exam      Physical Exam  Cardiovascular:      Rate and Rhythm: Normal rate and regular rhythm.   Pulmonary:      Effort: Pulmonary effort is normal.   Abdominal:      General: Abdomen is flat. Bowel sounds are normal.      Palpations: Abdomen is soft.      Comments: Post operative incisions present, healing surgical wounds c/o signs or symptoms of infection. Non tender with palpation    Neurological:      Mental Status: She is alert.          Results Review:    Results      Lab Results (last 24 hours)       Procedure Component Value Units Date/Time    CBC & Differential [[id number]]  (Abnormal) Collected: 11/04/24 0746    Specimen: Blood Updated: 11/04/24 1140    Narrative:      The following orders were created for panel order CBC & Differential.  Procedure                               Abnormality         Status                     ---------                               -----------         ------                     CBC  Auto Differential[[id number]]        Abnormal            Final result                 Please view results for these tests on the individual orders.    CBC Auto Differential [[id number]]  (Abnormal) Collected: 11/04/24 0746    Specimen: Blood Updated: 11/04/24 0752     WBC 13.60 10*3/mm3      RBC 3.57 10*6/mm3      Hemoglobin 8.1 g/dL      Hematocrit 27.2 %      MCV 76.2 fL      MCH 22.7 pg      MCHC 29.8 g/dL      RDW 17.9 %      RDW-SD 49.4 fl      MPV 10.2 fL      Platelets 503 10*3/mm3      Neutrophil % 78.9 %      Lymphocyte % 13.9 %      Monocyte % 5.3 %      Eosinophil % 0.7 %      Basophil % 0.4 %      Immature Grans % 0.8 %      Neutrophils, Absolute 10.72 10*3/mm3      Lymphocytes, Absolute 1.89 10*3/mm3      Monocytes, Absolute 0.72 10*3/mm3      Eosinophils, Absolute 0.10 10*3/mm3      Basophils, Absolute 0.06 10*3/mm3      Immature Grans, Absolute 0.11 10*3/mm3      nRBC 0.0 /100 WBC     Respiratory Panel PCR w/COVID-19(SARS-CoV-2) CLARISA/CHARLEE/DALJIT/PAD/COR/ALLY In-House, NP Swab in UTM/VTM, 2 HR TAT - Swab, Nasopharynx [[id number]]  (Normal) Collected: 11/03/24 1952    Specimen: Swab from Nasopharynx Updated: 11/03/24 2057     ADENOVIRUS, PCR Not Detected     Coronavirus 229E Not Detected     Coronavirus HKU1 Not Detected     Coronavirus NL63 Not Detected     Coronavirus OC43 Not Detected     COVID19 Not Detected     Human Metapneumovirus Not Detected     Human Rhinovirus/Enterovirus Not Detected     Influenza A PCR Not Detected     Influenza B PCR Not Detected     Parainfluenza Virus 1 Not Detected     Parainfluenza Virus 2 Not Detected     Parainfluenza Virus 3 Not Detected     Parainfluenza Virus 4 Not Detected     RSV, PCR Not Detected     Bordetella pertussis pcr Not Detected     Bordetella parapertussis PCR Not Detected     Chlamydophila pneumoniae PCR Not Detected     Mycoplasma pneumo by PCR Not Detected    Narrative:      In the setting of a positive respiratory panel with a viral infection PLUS a  negative procalcitonin without other underlying concern for bacterial infection, consider observing off antibiotics or discontinuation of antibiotics and continue supportive care. If the respiratory panel is positive for atypical bacterial infection (Bordetella pertussis, Chlamydophila pneumoniae, or Mycoplasma pneumoniae), consider antibiotic de-escalation to target atypical bacterial infection.    CBC & Differential [[id number]]  (Abnormal) Collected: 11/03/24 2043    Specimen: Blood Updated: 11/03/24 2050    Narrative:      The following orders were created for panel order CBC & Differential.  Procedure                               Abnormality         Status                     ---------                               -----------         ------                     CBC Auto Differential[[id number]]        Abnormal            Final result                 Please view results for these tests on the individual orders.    CBC Auto Differential [[id number]]  (Abnormal) Collected: 11/03/24 2043    Specimen: Blood Updated: 11/03/24 2050     WBC 18.72 10*3/mm3      RBC 3.93 10*6/mm3      Hemoglobin 9.1 g/dL      Hematocrit 29.5 %      MCV 75.1 fL      MCH 23.2 pg      MCHC 30.8 g/dL      RDW 18.0 %      RDW-SD 48.7 fl      MPV 10.5 fL      Platelets 633 10*3/mm3      Neutrophil % 80.6 %      Lymphocyte % 12.7 %      Monocyte % 4.6 %      Eosinophil % 0.2 %      Basophil % 0.4 %      Immature Grans % 1.5 %      Neutrophils, Absolute 15.09 10*3/mm3      Lymphocytes, Absolute 2.38 10*3/mm3      Monocytes, Absolute 0.87 10*3/mm3      Eosinophils, Absolute 0.03 10*3/mm3      Basophils, Absolute 0.07 10*3/mm3      Immature Grans, Absolute 0.28 10*3/mm3      nRBC 0.0 /100 WBC     Blood Culture - Blood, Arm, Right [[id number]] Collected: 11/03/24 2039    Specimen: Blood from Arm, Right Updated: 11/03/24 2049    Procalcitonin [[id number]]  (Normal) Collected: 11/03/24 1943    Specimen: Blood Updated: 11/03/24 2032     Procalcitonin 0.08  "ng/mL     Narrative:      As a Marker for Sepsis (Non-Neonates):    1. <0.5 ng/mL represents a low risk of severe sepsis and/or septic shock.  2. >2 ng/mL represents a high risk of severe sepsis and/or septic shock.    As a Marker for Lower Respiratory Tract Infections that require antibiotic therapy:    PCT on Admission    Antibiotic Therapy       6-12 Hrs later    >0.5                Strongly Recommended  >0.25 - <0.5        Recommended   0.1 - 0.25          Discouraged              Remeasure/reassess PCT  <0.1                Strongly Discouraged     Remeasure/reassess PCT    As 28 day mortality risk marker: \"Change in Procalcitonin Result\" (>80% or <=80%) if Day 0 (or Day 1) and Day 4 values are available. Refer to [email or web address]://[email or web address] – [email or web address]    Change in PCT <=80%  A decrease of PCT levels below or equal to 80% defines a positive change in PCT test result representing a higher risk for 28-day all-cause mortality of patients diagnosed with severe sepsis for septic shock.    Change in PCT >80%  A decrease of PCT levels of more than 80% defines a negative change in PCT result representing a lower risk for 28-day all-cause mortality of patients diagnosed with severe sepsis or septic shock.       C-reactive Protein [[id number]]  (Abnormal) Collected: 11/03/24 1943    Specimen: Blood Updated: 11/03/24 2028     C-Reactive Protein 5.54 mg/dL     Comprehensive Metabolic Panel [[id number]]  (Abnormal) Collected: 11/03/24 1943    Specimen: Blood Updated: 11/03/24 2026     Glucose 113 mg/dL      BUN 12 mg/dL      Creatinine 0.62 mg/dL      Sodium 139 mmol/L      Potassium 4.1 mmol/L      Chloride 104 mmol/L      CO2 25.0 mmol/L      Calcium 8.6 mg/dL      Total Protein 7.5 g/dL      Albumin 3.9 g/dL      ALT (SGPT) 32 U/L      AST (SGOT) 18 U/L      Alkaline Phosphatase 70 U/L      Total Bilirubin 0.2 mg/dL      Globulin 3.6 gm/dL      A/G Ratio 1.1 g/dL      BUN/Creatinine Ratio 19.4     Anion Gap 10.0 mmol/L      " eGFR 111.4 mL/min/1.73     Narrative:      GFR Normal >60  Chronic Kidney Disease <60  Kidney Failure <15      Urinalysis With Culture If Indicated - Urine, Clean Catch [[id number]]  (Normal) Collected: 11/03/24 2010    Specimen: Urine, Clean Catch Updated: 11/03/24 2026     Color, UA Yellow     Appearance, UA Clear     pH, UA 6.0     Specific Gravity, UA 1.022     Glucose, UA Negative     Ketones, UA Negative     Bilirubin, UA Negative     Blood, UA Negative     Protein, UA Negative     Leuk Esterase, UA Negative     Nitrite, UA Negative     Urobilinogen, UA 0.2 E.U./dL    Narrative:      In absence of clinical symptoms, the presence of pyuria, bacteria, and/or nitrites on the urinalysis result does not correlate with infection.  Urine microscopic not indicated.    Lipase [[id number]]  (Normal) Collected: 11/03/24 1943    Specimen: Blood Updated: 11/03/24 2021     Lipase 28 U/L     Lactic Acid, Plasma [[id number]]  (Normal) Collected: 11/03/24 1943    Specimen: Blood Updated: 11/03/24 2020     Lactate 1.2 mmol/L     Blood Culture - Blood, Arm, Left [[id number]] Collected: 11/03/24 1943    Specimen: Blood from Arm, Left Updated: 11/03/24 2019    D-dimer, Quantitative [[id number]]  (Abnormal) Collected: 11/03/24 1943    Specimen: Blood Updated: 11/03/24 2012     D-Dimer, Quantitative 2.75 MCGFEU/mL     Narrative:      According to the assay 's published package insert, a normal (<0.50 MCGFEU/mL) D-dimer result in conjunction with a non-high clinical probability assessment, excludes deep vein thrombosis (DVT) and pulmonary embolism (PE) with high sensitivity.    D-dimer values increase with age and this can make VTE exclusion of an older population difficult. To address this, the [employer], based on best available evidence and recent guidelines, recommends that clinicians use age-adjusted D-dimer thresholds in patients greater than 50 years of age with: a) a low probability of PE who do not  "meet all Pulmonary Embolism Rule Out Criteria, or b) in those with intermediate probability of PE.   The formula for an age-adjusted D-dimer cut-off is \"age/100\".  For example, a 60 year old patient would have an age-adjusted cut-off of 0.60 MCGFEU/mL and an 80 year old 0.80 MCGFEU/mL.    Sedimentation Rate [[id number]]  (Abnormal) Collected: 11/03/24 1943    Specimen: Blood Updated: 11/03/24 2010     Sed Rate 116 mm/hr           Imaging Results (Last 24 Hours)       Procedure Component Value Units Date/Time    CT Angiogram Chest [[id number]] Collected: 11/03/24 2058     Updated: 11/03/24 2106    Narrative:      Exam: CT angiogram of the of the chest with intravenous contrast.  11/3/2024     CLINICAL HISTORY: Recent surgery. Right-sided pleuritic chest pain.  Elevated D-dimer.     DOSE:  304.51 mGy.cm . All CT scans are performed using dose  optimization techniques as appropriate to the performed exam and  including at least one of the following: Automated exposure control,  adjustment of the mA and/or kV according to size, and the use of the  iterative reconstruction technique.     TECHNIQUE: Contiguous axial images were obtained through the thorax  following intravenous contrast administration with reformatted images  obtained in the sagittal and coronal projections from the original data  set. Three-dimensional reconstructions are also obtained.     COMPARISON: None     FINDINGS:     Pulmonary arteries:  There is normal enhancement of the pulmonary  arteries with no evidence of pulmonary thromboembolic disease.     Aorta:  The thoracic aorta and proximal great vessels are normal in  appearance. There is no evidence of aortic dissection or aneurysm.     LUNGS: Biapical fibronodular changes are present. These are symmetric.  The lungs are otherwise clear. No consolidative pneumonia or effusion.     Pleural spaces: Unremarkable. No evidence of pleural effusion or  pneumothorax.     HEART: No evidence of enlargement. " No coronary artery calcifications are  present. There is no evidence of pericardial effusion. No evidence of RV  dysfunction.     Bones: No acute osseous abnormalities are demonstrated.     CHEST WALL: There is asymmetry within the left breast which measures  fluid density. I would favor a benign cyst within the left breast. This  was confirmed on previous diagnostic mammography and ultrasound in 2022.  No additional chest wall abnormalities present.     Lymph nodes: No enlarged mediastinal or axillary lymphadenopathy is  present.     Upper abdomen: Limited images of the upper abdomen are unremarkable.       Impression:      1. No evidence of pulmonary thromboembolic disease. The thoracic aorta  is normal in caliber with no evidence of dissection or aneurysm. The  lungs are clear.  2. Left breast cyst.     This report was signed and finalized on 11/3/2024 9:03 PM by Dr. Tera Ward MD.       CT Abdomen Pelvis With Contrast [[id number]] Collected: 11/03/24 2009     Updated: 11/03/24 2024    Narrative:      CT ABDOMEN PELVIS W CONTRAST- 11/3/2024 6:57 PM     HISTORY: recent hysterectomy with postoperative abscess       COMPARISON: Outside CT dated 10/24/2024.     DLP: 271.98 mGy.cm. All CT scans are performed using dose optimization  techniques as appropriate to the performed exam and including at least  one of the following: Automated exposure control, adjustment of the mA  and/or kV according to size, and the use of the iterative reconstruction  technique.     TECHNIQUE: Following the intravenous administration of contrast, helical  CT tomographic images of the abdomen and pelvis were acquired. Coronal  reformatted images were also provided for review.     FINDINGS:  The lung bases and base of the heart are unremarkable.     LIVER: No focal liver lesion. The hepatic vasculature is patent.     BILIARY SYSTEM: The gallbladder is unremarkable. No intrahepatic or  extrahepatic ductal dilatation.     PANCREAS: No  focal pancreatic lesion.     SPLEEN: Unremarkable.     KIDNEYS AND ADRENALS: Bilateral kidneys and adrenal glands are  unremarkable. The ureters are decompressed and normal in appearance.     RETROPERITONEUM: No mass, lymphadenopathy or hemorrhage.     GI TRACT: There is constipation with increased stool within the right  and transverse colon. The small bowel is normal in distribution and  appearance. No evidence of mechanical obstruction. No gastric wall  thickening or gastric outlet obstruction.. The appendix is surgically  absent..     OTHER: No mesenteric mass is demonstrated. No evidence of adenopathy in  the small bowel mesentery.. 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 This is new from the previous examination. The patient has  undergone recent hysterectomy.. The osseous structures and soft tissues  demonstrate no worrisome lesions. A fat-containing periumbilical hernia  is present.     PELVIS: The patient has undergone hysterectomy. There is a small volume  of air within the urinary bladder. There is mild stranding in the  perivesical space. At the apex of the vaginal cuff there is a fluid  collection containing some air which measures approximately 6.4 x 4.4 cm  in size consistent with a postoperative abscess. This shows significant  interval improvement from the previous examination of 10/24/2024 at  which time this collection measured 11.0 x 6.7 cm in size. There is   residual inflammatory stranding within the pelvic fat.. No significant  bladder wall thickening is present..       Impression:      1. The patient has undergone recent hysterectomy. There is a  postoperative abscess noted at the level of the apex of the vaginal cuff  containing some air. This shows interval improvement from the previous  examination of 10/24/2024. There is  persistent inflammatory stranding  within the pelvis. There is also air within the urinary bladder with  mild enhancement of the bladder wall and stranding  in the perivesical  space. Correlation is recommended with the patient's urinalysis. No free  fluid in the pelvis.  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  3. No evidence of nephrolithiasis or obstructive uropathy. No free  flowing effusion present within the abdomen or pelvis.  4. Constipation with increased stool within the right and transverse  colon. No mechanical bowel obstruction..           This report was signed and finalized on 11/3/2024 8:21 PM by Dr. Tera Ward MD.       XR Chest 1 View [[id number]] Collected: 11/03/24 1931     Updated: 11/03/24 1934    Narrative:      EXAMINATION: XR CHEST 1 VW-  11/3/2024 7:31 PM     Comparison: None available     HISTORY: Fever     One-view chest: Upright frontal projection of the chest is obtained. The  lungs are clear with no evidence of acute parenchymal  consolidation. No  pleural effusion or free air is observed. The  mediastinal contours are  within normal limits.                                                                                                                         Impression:      Impression: No evidence of acute cardiopulmonary disease.     This report was signed and finalized on 11/3/2024 7:31 PM by Dr. Tera Ward MD.               ASSESSMENT/PLAN  Anaerobic Culture - Body Fluid, Pelvis (10/24/2024 23:27)   Assessment & Plan       Diagnosis Plan   1. Postoperative abscess        2. Fever, unspecified fever cause        3. History of hysterectomy             Postoperative abscess    Dr. Rdz and I both saw patient this AM. Leukocytosis is improving. Advised to continue to slowly drink fluids and advance diet. If she tolerates clears, nausea improved and WBCs trend downward tomorrow the plan will be to d/c home. Patient's symptoms have improved since yesterday. Culture from recent surgery reviewed with patient. Continue IV antibiotics and will plan to transition to oral antibiotics prior to d/c home.     Susan Balbuena,  SERGE  11/04/24  14:06 CST            Electronically signed by Susan Balbuena APRN at 11/04/24 3801

## 2024-11-05 NOTE — PROGRESS NOTES
General Surgery     Referring Provider: No ref. provider found    Patient Care Team:  Rashawn Mcgovern MD as PCP - General (Family Medicine)    Subjective      Chief complaint: Fevers, chills    History of present illness:    Aubree Alegria had several episodes of a spiked temp of greater than 100 which was controlled with medication.  The steps were associated with nausea.  This a.m. she feels better after taking her Motrin and Tylenol to drop her temps.  Has some complaints of abdominal pain in the suprapubic location this a.m.    Review of Systems:    Review of Systems - General ROS: positive for  - fever  Respiratory ROS: no cough, shortness of breath, or wheezing  Cardiovascular ROS: no chest pain or dyspnea on exertion  Gastrointestinal ROS: positive for - abdominal pain and nausea    History  Past Medical History:   Diagnosis Date    Spinal headache    ,   Past Surgical History:   Procedure Laterality Date    APPENDECTOMY      BREAST BIOPSY Bilateral     benign     SECTION      x2    DIAGNOSTIC LAPAROSCOPY N/A 10/24/2024    Procedure: DIAGNOSTIC LAPAROSCOPY WITH DAVINCI ROBOT;  Surgeon: Sami Villalobos MD;  Location: [facility] OR;  Service: Obstetrics/Gynecology;  Laterality: N/A;    DIAGNOSTIC LAPAROSCOPY N/A 10/24/2024    Procedure: DIAGNOSTIC LAPAROSCOPY WITH DAVINCI ROBOT;  Surgeon: Goran Rdz MD;  Location: [facility] OR;  Service: General;  Laterality: N/A;    ECTOPIC PREGNANCY      TOTAL LAPAROSCOPIC HYSTERECTOMY SALPINGO OOPHORECTOMY Bilateral 10/21/2024    Procedure: TOTAL LAPAROSCOPIC HYSTERECTOMY BILATERAL SALPINGOOPHORECTOMY WITH DAVINCI ROBOT;  Surgeon: Kathy Marie MD;  Location: [facility] OR;  Service: Robotics - DaVinci;  Laterality: Bilateral;    TUBAL ABDOMINAL LIGATION      WISDOM TOOTH EXTRACTION     ,   Family History   Problem Relation Age of Onset    Kidney cancer Maternal Aunt     Hypertension Father     No Known Problems Mother     No Known Problems Sister     Leukemia  Paternal Grandmother     Breast cancer Paternal Aunt 40    Ovarian cancer Neg Hx     Uterine cancer Neg Hx     Colon cancer Neg Hx     Melanoma Neg Hx     Prostate cancer Neg Hx    ,   Social History     Tobacco Use    Smoking status: Some Days     Types: Cigarettes     Passive exposure: Current    Smokeless tobacco: Never   Vaping Use    Vaping status: Never Used   Substance Use Topics    Alcohol use: Never    Drug use: Never   ,   Medications Prior to Admission   Medication Sig Dispense Refill Last Dose/Taking    acetaminophen (TYLENOL) 500 MG tablet Take 1 tablet by mouth Every 6 (Six) Hours As Needed for Mild Pain for up to 20 days. 60 tablet 0     naloxone (NARCAN) 4 MG/0.1ML nasal spray Call 911. Don't prime. [place] in 1 nostril for overdose. Repeat in 2-3 minutes in other nostril if no or minimal breathing/responsiveness. (Patient not taking: Reported on 10/30/2024) 2 each 0     ondansetron ODT (ZOFRAN-ODT) 4 MG disintegrating tablet Place 1 tablet on the tongue Every 8 (Eight) Hours As Needed for Nausea or Vomiting. 30 tablet 1     [] oxyCODONE (ROXICODONE) 5 MG immediate release tablet Take 1 tablet by mouth Every 8 (Eight) Hours As Needed for Moderate Pain for up to 7 days. 20 tablet 0     potassium chloride (KLOR-CON M20) 20 MEQ CR tablet Take 1 tablet by mouth 2 (Two) Times a Day. (Patient not taking: Reported on 10/30/2024) 6 tablet 0     and Allergies:  Ketorolac tromethamine and Tramadol    Current Facility-Administered Medications:     acetaminophen (TYLENOL) tablet 650 mg, 650 mg, Oral, Q4H PRN, Lance Mcclain DO, 650 mg at 24    cyclobenzaprine (FLEXERIL) tablet 10 mg, 10 mg, Oral, Q8H PRN, Michelle Larson APRN    docusate sodium (COLACE) capsule 100 mg, 100 mg, Oral, BID, Kathy Marie MD, 100 mg at 24    heparin (porcine) 5000 UNIT/ML injection 5,000 Units, 5,000 Units, Subcutaneous, Q12H, Kathy Marie MD, 5,000 Units at 24 0921    ibuprofen  (ADVIL,MOTRIN) tablet 600 mg, 600 mg, Oral, Q6H PRN, Lance Mcclain, , 600 mg at 11/05/24 0610    ondansetron ODT (ZOFRAN-ODT) disintegrating tablet 4 mg, 4 mg, Oral, Q6H PRN **OR** ondansetron (ZOFRAN) injection 4 mg, 4 mg, Intravenous, Q6H PRN, Lance Mcclain, , 4 mg at 11/04/24 1922    oxyCODONE (ROXICODONE) immediate release tablet 5 mg, 5 mg, Oral, Q4H PRN, Michelle Larson, APRN, 5 mg at 11/04/24 2206    piperacillin-tazobactam (ZOSYN) 3.375 g IVPB in 100 mL NS MBP (CD), 3.375 g, Intravenous, Q8H, Kathy Marie MD, 3.375 g at 11/05/24 0244    [COMPLETED] Insert Peripheral IV, , , Once **AND** sodium chloride 0.9 % flush 10 mL, 10 mL, Intravenous, PRN, Krystyna Sánchez, APRN    sodium chloride 0.9 % flush 10 mL, 10 mL, Intravenous, Q12H, Lance Mcclain,     sodium chloride 0.9 % infusion 40 mL, 40 mL, Intravenous, PRN, Lance Mcclain,     sodium chloride 0.9 % infusion, 125 mL/hr, Intravenous, Continuous, Lance Mcclain, , Last Rate: 125 mL/hr at 11/05/24 0216, 125 mL/hr at 11/05/24 0216    Objective     Vital Signs   Temp:  [97.4 °F (36.3 °C)-101.9 °F (38.8 °C)] 98.7 °F (37.1 °C)  Heart Rate:  [] 93  Resp:  [16-20] 16  BP: ()/(59-83) 109/59    Physical Exam:  Physical Exam  Constitutional:       General: She is not in acute distress.     Appearance: She is normal weight. She is not ill-appearing or diaphoretic.   Abdominal:      General: A surgical scar is present. Bowel sounds are normal. There is no distension.      Palpations: Abdomen is soft.      Tenderness: There is abdominal tenderness in the suprapubic area. There is no guarding or rebound.      Comments: Surgical site is clean dry and intact   Neurological:      Mental Status: She is alert.          Results Review:    Lab Results (last 24 hours)       Procedure Component Value Units Date/Time    CBC & Differential [[id number]]  (Abnormal) Collected: 11/05/24 0841    Specimen: Blood Updated: 11/05/24 0858     Narrative:      The following orders were created for panel order CBC & Differential.  Procedure                               Abnormality         Status                     ---------                               -----------         ------                     CBC Auto Differential[[id number]]        Abnormal            Final result                 Please view results for these tests on the individual orders.    CBC Auto Differential [[id number]]  (Abnormal) Collected: 11/05/24 0841    Specimen: Blood Updated: 11/05/24 0858     WBC 13.39 10*3/mm3      RBC 3.50 10*6/mm3      Hemoglobin 7.9 g/dL      Hematocrit 26.6 %      MCV 76.0 fL      MCH 22.6 pg      MCHC 29.7 g/dL      RDW 18.0 %      RDW-SD 49.1 fl      MPV 10.6 fL      Platelets 540 10*3/mm3      Neutrophil % 78.8 %      Lymphocyte % 13.5 %      Monocyte % 5.4 %      Eosinophil % 1.3 %      Basophil % 0.4 %      Immature Grans % 0.6 %      Neutrophils, Absolute 10.55 10*3/mm3      Lymphocytes, Absolute 1.81 10*3/mm3      Monocytes, Absolute 0.72 10*3/mm3      Eosinophils, Absolute 0.17 10*3/mm3      Basophils, Absolute 0.06 10*3/mm3      Immature Grans, Absolute 0.08 10*3/mm3      nRBC 0.0 /100 WBC     Comprehensive Metabolic Panel [[id number]]  (Abnormal) Collected: 11/05/24 0527    Specimen: Blood Updated: 11/05/24 0627     Glucose 99 mg/dL      BUN 7 mg/dL      Creatinine 0.64 mg/dL      Sodium 140 mmol/L      Potassium 3.7 mmol/L      Chloride 109 mmol/L      CO2 24.0 mmol/L      Calcium 7.9 mg/dL      Total Protein 6.0 g/dL      Albumin 3.0 g/dL      ALT (SGPT) 20 U/L      AST (SGOT) 13 U/L      Alkaline Phosphatase 56 U/L      Total Bilirubin 0.2 mg/dL      Globulin 3.0 gm/dL      A/G Ratio 1.0 g/dL      BUN/Creatinine Ratio 10.9     Anion Gap 7.0 mmol/L      eGFR 110.5 mL/min/1.73     Narrative:      GFR Normal >60  Chronic Kidney Disease <60  Kidney Failure <15      CBC & Differential [[id number]]  (Abnormal) Collected: 11/05/24 0527    Specimen: Blood  Updated: 11/05/24 0606    Narrative:      The following orders were created for panel order CBC & Differential.  Procedure                               Abnormality         Status                     ---------                               -----------         ------                     CBC Auto Differential[[id number]]        Abnormal            Final result                 Please view results for these tests on the individual orders.    CBC Auto Differential [[id number]]  (Abnormal) Collected: 11/05/24 0527    Specimen: Blood Updated: 11/05/24 0606     WBC 13.97 10*3/mm3      RBC 3.27 10*6/mm3      Hemoglobin 7.5 g/dL      Hematocrit 24.7 %      MCV 75.5 fL      MCH 22.9 pg      MCHC 30.4 g/dL      RDW 18.3 %      RDW-SD 50.0 fl      MPV 11.6 fL      Platelets 457 10*3/mm3      Neutrophil % 75.1 %      Lymphocyte % 14.7 %      Monocyte % 6.7 %      Eosinophil % 1.5 %      Basophil % 0.6 %      Immature Grans % 1.4 %      Neutrophils, Absolute 10.49 10*3/mm3      Lymphocytes, Absolute 2.06 10*3/mm3      Monocytes, Absolute 0.94 10*3/mm3      Eosinophils, Absolute 0.21 10*3/mm3      Basophils, Absolute 0.08 10*3/mm3      Immature Grans, Absolute 0.19 10*3/mm3      nRBC 0.0 /100 WBC     Blood Culture - Blood, Arm, Right [[id number]]  (Normal) Collected: 11/03/24 2039    Specimen: Blood from Arm, Right Updated: 11/04/24 2100     Blood Culture No growth at 24 hours    Blood Culture - Blood, Arm, Left [[id number]]  (Normal) Collected: 11/03/24 1943    Specimen: Blood from Arm, Left Updated: 11/04/24 2031     Blood Culture No growth at 24 hours          Imaging Results (Last 24 Hours)       ** No results found for the last 24 hours. **              ASSESSMENT/PLAN   Diagnosis Plan   1. Postoperative abscess        2. Fever, unspecified fever cause        3. History of hysterectomy           Plan: Patient currently comfortable.  Discussed patient with Dr. Marie and plan to evacuate the fluid collection in the suprapubic  location tomorrow a.m. by a vaginal approach.  I believe this is appropriate due to her symptoms associated with this fluid collection.  The fluid could be residual abscess which requires to be drained.  Continue antibiotics are recommended.  Discussed course with the patient who is comfortable with this decision.      Goran Rdz MD  11/05/24  10:03 CST

## 2024-11-05 NOTE — PLAN OF CARE
Goal Outcome Evaluation:  Plan of Care Reviewed With: patient, spouse        Progress: improving  Outcome Evaluation: VSS. Temp about mid morning, voiding and ambulating. Pain controlled with tylenol and motrin. IV abx given.  Patient to be NPO at midnight for surgery in AM.

## 2024-11-06 ENCOUNTER — ANESTHESIA (OUTPATIENT)
Dept: PERIOP | Facility: HOSPITAL | Age: 44
End: 2024-11-06
Payer: COMMERCIAL

## 2024-11-06 ENCOUNTER — APPOINTMENT (OUTPATIENT)
Dept: CT IMAGING | Facility: HOSPITAL | Age: 44
End: 2024-11-06
Payer: COMMERCIAL

## 2024-11-06 ENCOUNTER — ANESTHESIA EVENT (OUTPATIENT)
Dept: PERIOP | Facility: HOSPITAL | Age: 44
End: 2024-11-06
Payer: COMMERCIAL

## 2024-11-06 LAB
BASOPHILS # BLD AUTO: 0.05 10*3/MM3 (ref 0–0.2)
BASOPHILS NFR BLD AUTO: 0.4 % (ref 0–1.5)
DEPRECATED RDW RBC AUTO: 50.2 FL (ref 37–54)
EOSINOPHIL # BLD AUTO: 0.16 10*3/MM3 (ref 0–0.4)
EOSINOPHIL NFR BLD AUTO: 1.4 % (ref 0.3–6.2)
ERYTHROCYTE [DISTWIDTH] IN BLOOD BY AUTOMATED COUNT: 18 % (ref 12.3–15.4)
HCT VFR BLD AUTO: 24.7 % (ref 34–46.6)
HGB BLD-MCNC: 7.3 G/DL (ref 12–15.9)
IMM GRANULOCYTES # BLD AUTO: 0.07 10*3/MM3 (ref 0–0.05)
IMM GRANULOCYTES NFR BLD AUTO: 0.6 % (ref 0–0.5)
LYMPHOCYTES # BLD AUTO: 2.21 10*3/MM3 (ref 0.7–3.1)
LYMPHOCYTES NFR BLD AUTO: 18.9 % (ref 19.6–45.3)
MCH RBC QN AUTO: 22.5 PG (ref 26.6–33)
MCHC RBC AUTO-ENTMCNC: 29.6 G/DL (ref 31.5–35.7)
MCV RBC AUTO: 76.2 FL (ref 79–97)
MONOCYTES # BLD AUTO: 0.87 10*3/MM3 (ref 0.1–0.9)
MONOCYTES NFR BLD AUTO: 7.4 % (ref 5–12)
NEUTROPHILS NFR BLD AUTO: 71.3 % (ref 42.7–76)
NEUTROPHILS NFR BLD AUTO: 8.36 10*3/MM3 (ref 1.7–7)
NRBC BLD AUTO-RTO: 0 /100 WBC (ref 0–0.2)
PLATELET # BLD AUTO: 534 10*3/MM3 (ref 140–450)
PMV BLD AUTO: 11 FL (ref 6–12)
RBC # BLD AUTO: 3.24 10*6/MM3 (ref 3.77–5.28)
WBC NRBC COR # BLD AUTO: 11.72 10*3/MM3 (ref 3.4–10.8)

## 2024-11-06 PROCEDURE — 25010000002 FENTANYL CITRATE (PF) 100 MCG/2ML SOLUTION

## 2024-11-06 PROCEDURE — 25010000002 GLYCOPYRROLATE 0.4 MG/2ML SOLUTION

## 2024-11-06 PROCEDURE — 25010000002 PROPOFOL 10 MG/ML EMULSION

## 2024-11-06 PROCEDURE — 57000 COLPOTOMY W/EXPLORATION: CPT | Performed by: OBSTETRICS & GYNECOLOGY

## 2024-11-06 PROCEDURE — 74177 CT ABD & PELVIS W/CONTRAST: CPT

## 2024-11-06 PROCEDURE — 99024 POSTOP FOLLOW-UP VISIT: CPT | Performed by: OBSTETRICS & GYNECOLOGY

## 2024-11-06 PROCEDURE — 85025 COMPLETE CBC W/AUTO DIFF WBC: CPT | Performed by: OBSTETRICS & GYNECOLOGY

## 2024-11-06 PROCEDURE — 25810000003 LACTATED RINGERS PER 1000 ML

## 2024-11-06 PROCEDURE — 25010000002 ONDANSETRON PER 1 MG

## 2024-11-06 PROCEDURE — 25010000002 DEXAMETHASONE PER 1 MG

## 2024-11-06 PROCEDURE — 25010000002 PIPERACILLIN SOD-TAZOBACTAM PER 1 G: Performed by: OBSTETRICS & GYNECOLOGY

## 2024-11-06 PROCEDURE — 25010000002 HEPARIN (PORCINE) PER 1000 UNITS: Performed by: OBSTETRICS & GYNECOLOGY

## 2024-11-06 PROCEDURE — 25510000001 IOPAMIDOL 61 % SOLUTION: Performed by: OBSTETRICS & GYNECOLOGY

## 2024-11-06 PROCEDURE — 0U9G7ZZ DRAINAGE OF VAGINA, VIA NATURAL OR ARTIFICIAL OPENING: ICD-10-PCS | Performed by: OBSTETRICS & GYNECOLOGY

## 2024-11-06 PROCEDURE — 25010000002 LIDOCAINE PF 2% 2 % SOLUTION

## 2024-11-06 RX ORDER — SODIUM CHLORIDE 9 MG/ML
40 INJECTION, SOLUTION INTRAVENOUS AS NEEDED
Status: DISCONTINUED | OUTPATIENT
Start: 2024-11-06 | End: 2024-11-06 | Stop reason: HOSPADM

## 2024-11-06 RX ORDER — IBUPROFEN 600 MG/1
600 TABLET, FILM COATED ORAL EVERY 6 HOURS PRN
Status: DISCONTINUED | OUTPATIENT
Start: 2024-11-06 | End: 2024-11-06 | Stop reason: HOSPADM

## 2024-11-06 RX ORDER — SODIUM CHLORIDE 0.9 % (FLUSH) 0.9 %
3 SYRINGE (ML) INJECTION EVERY 12 HOURS SCHEDULED
Status: DISCONTINUED | OUTPATIENT
Start: 2024-11-06 | End: 2024-11-06 | Stop reason: HOSPADM

## 2024-11-06 RX ORDER — DROPERIDOL 2.5 MG/ML
0.62 INJECTION, SOLUTION INTRAMUSCULAR; INTRAVENOUS ONCE AS NEEDED
Status: DISCONTINUED | OUTPATIENT
Start: 2024-11-06 | End: 2024-11-06 | Stop reason: HOSPADM

## 2024-11-06 RX ORDER — ROCURONIUM BROMIDE 10 MG/ML
INJECTION, SOLUTION INTRAVENOUS AS NEEDED
Status: DISCONTINUED | OUTPATIENT
Start: 2024-11-06 | End: 2024-11-06 | Stop reason: SURG

## 2024-11-06 RX ORDER — SODIUM CHLORIDE, SODIUM LACTATE, POTASSIUM CHLORIDE, CALCIUM CHLORIDE 600; 310; 30; 20 MG/100ML; MG/100ML; MG/100ML; MG/100ML
INJECTION, SOLUTION INTRAVENOUS CONTINUOUS PRN
Status: DISCONTINUED | OUTPATIENT
Start: 2024-11-06 | End: 2024-11-06 | Stop reason: SURG

## 2024-11-06 RX ORDER — ACETAMINOPHEN 500 MG
1000 TABLET ORAL ONCE
Status: DISCONTINUED | OUTPATIENT
Start: 2024-11-06 | End: 2024-11-06 | Stop reason: HOSPADM

## 2024-11-06 RX ORDER — NEOSTIGMINE METHYLSULFATE 5 MG/5 ML
SYRINGE (ML) INTRAVENOUS AS NEEDED
Status: DISCONTINUED | OUTPATIENT
Start: 2024-11-06 | End: 2024-11-06 | Stop reason: SURG

## 2024-11-06 RX ORDER — ONDANSETRON 2 MG/ML
4 INJECTION INTRAMUSCULAR; INTRAVENOUS ONCE AS NEEDED
Status: DISCONTINUED | OUTPATIENT
Start: 2024-11-06 | End: 2024-11-06 | Stop reason: HOSPADM

## 2024-11-06 RX ORDER — NALOXONE HCL 0.4 MG/ML
0.4 VIAL (ML) INJECTION AS NEEDED
Status: DISCONTINUED | OUTPATIENT
Start: 2024-11-06 | End: 2024-11-06 | Stop reason: HOSPADM

## 2024-11-06 RX ORDER — MIDAZOLAM HYDROCHLORIDE 2 MG/2ML
1 INJECTION, SOLUTION INTRAMUSCULAR; INTRAVENOUS
Status: DISCONTINUED | OUTPATIENT
Start: 2024-11-06 | End: 2024-11-06 | Stop reason: HOSPADM

## 2024-11-06 RX ORDER — LABETALOL HYDROCHLORIDE 5 MG/ML
5 INJECTION, SOLUTION INTRAVENOUS
Status: DISCONTINUED | OUTPATIENT
Start: 2024-11-06 | End: 2024-11-06 | Stop reason: HOSPADM

## 2024-11-06 RX ORDER — DEXAMETHASONE SODIUM PHOSPHATE 4 MG/ML
INJECTION, SOLUTION INTRA-ARTICULAR; INTRALESIONAL; INTRAMUSCULAR; INTRAVENOUS; SOFT TISSUE AS NEEDED
Status: DISCONTINUED | OUTPATIENT
Start: 2024-11-06 | End: 2024-11-06 | Stop reason: SURG

## 2024-11-06 RX ORDER — SODIUM CHLORIDE 0.9 % (FLUSH) 0.9 %
3-10 SYRINGE (ML) INJECTION AS NEEDED
Status: DISCONTINUED | OUTPATIENT
Start: 2024-11-06 | End: 2024-11-06 | Stop reason: HOSPADM

## 2024-11-06 RX ORDER — HYDROCODONE BITARTRATE AND ACETAMINOPHEN 10; 325 MG/1; MG/1
1 TABLET ORAL EVERY 4 HOURS PRN
Status: DISCONTINUED | OUTPATIENT
Start: 2024-11-06 | End: 2024-11-06 | Stop reason: HOSPADM

## 2024-11-06 RX ORDER — GLYCOPYRROLATE 0.2 MG/ML
INJECTION INTRAMUSCULAR; INTRAVENOUS AS NEEDED
Status: DISCONTINUED | OUTPATIENT
Start: 2024-11-06 | End: 2024-11-06 | Stop reason: SURG

## 2024-11-06 RX ORDER — ONDANSETRON 2 MG/ML
INJECTION INTRAMUSCULAR; INTRAVENOUS AS NEEDED
Status: DISCONTINUED | OUTPATIENT
Start: 2024-11-06 | End: 2024-11-06 | Stop reason: SURG

## 2024-11-06 RX ORDER — LIDOCAINE HYDROCHLORIDE 20 MG/ML
INJECTION, SOLUTION EPIDURAL; INFILTRATION; INTRACAUDAL; PERINEURAL AS NEEDED
Status: DISCONTINUED | OUTPATIENT
Start: 2024-11-06 | End: 2024-11-06 | Stop reason: SURG

## 2024-11-06 RX ORDER — FENTANYL CITRATE 50 UG/ML
INJECTION, SOLUTION INTRAMUSCULAR; INTRAVENOUS AS NEEDED
Status: DISCONTINUED | OUTPATIENT
Start: 2024-11-06 | End: 2024-11-06 | Stop reason: SURG

## 2024-11-06 RX ORDER — HYDROCODONE BITARTRATE AND ACETAMINOPHEN 5; 325 MG/1; MG/1
1 TABLET ORAL EVERY 4 HOURS PRN
Status: DISCONTINUED | OUTPATIENT
Start: 2024-11-06 | End: 2024-11-06 | Stop reason: HOSPADM

## 2024-11-06 RX ORDER — IOPAMIDOL 612 MG/ML
100 INJECTION, SOLUTION INTRAVASCULAR
Status: COMPLETED | OUTPATIENT
Start: 2024-11-06 | End: 2024-11-06

## 2024-11-06 RX ORDER — FENTANYL CITRATE 50 UG/ML
50 INJECTION, SOLUTION INTRAMUSCULAR; INTRAVENOUS
Status: DISCONTINUED | OUTPATIENT
Start: 2024-11-06 | End: 2024-11-06 | Stop reason: HOSPADM

## 2024-11-06 RX ORDER — FLUMAZENIL 0.1 MG/ML
0.2 INJECTION INTRAVENOUS AS NEEDED
Status: DISCONTINUED | OUTPATIENT
Start: 2024-11-06 | End: 2024-11-06 | Stop reason: HOSPADM

## 2024-11-06 RX ORDER — PROPOFOL 10 MG/ML
VIAL (ML) INTRAVENOUS AS NEEDED
Status: DISCONTINUED | OUTPATIENT
Start: 2024-11-06 | End: 2024-11-06 | Stop reason: SURG

## 2024-11-06 RX ORDER — SODIUM CHLORIDE, SODIUM LACTATE, POTASSIUM CHLORIDE, CALCIUM CHLORIDE 600; 310; 30; 20 MG/100ML; MG/100ML; MG/100ML; MG/100ML
100 INJECTION, SOLUTION INTRAVENOUS CONTINUOUS
Status: DISCONTINUED | OUTPATIENT
Start: 2024-11-06 | End: 2024-11-07 | Stop reason: HOSPADM

## 2024-11-06 RX ADMIN — DOCUSATE SODIUM 100 MG: 100 CAPSULE, LIQUID FILLED ORAL at 08:41

## 2024-11-06 RX ADMIN — PIPERACILLIN SODIUM AND TAZOBACTAM SODIUM 3.38 G: 3; .375 INJECTION, POWDER, LYOPHILIZED, FOR SOLUTION INTRAVENOUS at 10:38

## 2024-11-06 RX ADMIN — IBUPROFEN 600 MG: 600 TABLET, FILM COATED ORAL at 08:41

## 2024-11-06 RX ADMIN — DEXAMETHASONE SODIUM PHOSPHATE 4 MG: 4 INJECTION, SOLUTION INTRA-ARTICULAR; INTRALESIONAL; INTRAMUSCULAR; INTRAVENOUS; SOFT TISSUE at 13:10

## 2024-11-06 RX ADMIN — ONDANSETRON 4 MG: 2 INJECTION INTRAMUSCULAR; INTRAVENOUS at 13:10

## 2024-11-06 RX ADMIN — ACETAMINOPHEN 650 MG: 325 TABLET ORAL at 08:41

## 2024-11-06 RX ADMIN — FENTANYL CITRATE 100 MCG: 50 INJECTION, SOLUTION INTRAMUSCULAR; INTRAVENOUS at 13:06

## 2024-11-06 RX ADMIN — Medication 3 MG: at 13:41

## 2024-11-06 RX ADMIN — HYDROCODONE BITARTRATE AND ACETAMINOPHEN 1 TABLET: 10; 325 TABLET ORAL at 14:19

## 2024-11-06 RX ADMIN — PROPOFOL 200 MG: 10 INJECTION, EMULSION INTRAVENOUS at 13:06

## 2024-11-06 RX ADMIN — DOCUSATE SODIUM 100 MG: 100 CAPSULE, LIQUID FILLED ORAL at 21:01

## 2024-11-06 RX ADMIN — GLYCOPYRROLATE 0.4 MG: 0.2 INJECTION INTRAMUSCULAR; INTRAVENOUS at 13:41

## 2024-11-06 RX ADMIN — IOPAMIDOL 100 ML: 612 INJECTION, SOLUTION INTRAVENOUS at 09:45

## 2024-11-06 RX ADMIN — LIDOCAINE HYDROCHLORIDE 80 MG: 20 INJECTION, SOLUTION EPIDURAL; INFILTRATION; INTRACAUDAL; PERINEURAL at 13:06

## 2024-11-06 RX ADMIN — SODIUM CHLORIDE, POTASSIUM CHLORIDE, SODIUM LACTATE AND CALCIUM CHLORIDE: 600; 310; 30; 20 INJECTION, SOLUTION INTRAVENOUS at 13:06

## 2024-11-06 RX ADMIN — HEPARIN SODIUM 5000 UNITS: 5000 INJECTION, SOLUTION INTRAVENOUS; SUBCUTANEOUS at 21:01

## 2024-11-06 RX ADMIN — ROCURONIUM 30 MG: 50 INJECTION, SOLUTION INTRAVENOUS at 13:06

## 2024-11-06 RX ADMIN — PIPERACILLIN SODIUM AND TAZOBACTAM SODIUM 3.38 G: 3; .375 INJECTION, POWDER, LYOPHILIZED, FOR SOLUTION INTRAVENOUS at 03:30

## 2024-11-06 RX ADMIN — HEPARIN SODIUM 5000 UNITS: 5000 INJECTION, SOLUTION INTRAVENOUS; SUBCUTANEOUS at 08:42

## 2024-11-06 RX ADMIN — ACETAMINOPHEN 650 MG: 325 TABLET ORAL at 05:09

## 2024-11-06 RX ADMIN — PIPERACILLIN SODIUM AND TAZOBACTAM SODIUM 3.38 G: 3; .375 INJECTION, POWDER, LYOPHILIZED, FOR SOLUTION INTRAVENOUS at 18:06

## 2024-11-06 NOTE — ANESTHESIA PROCEDURE NOTES
Airway  Date/Time: 11/6/2024 1:08 PM  Airway not difficult    General Information and Staff    Patient location during procedure: OR  CRNA/CAA: Smita Cox CRNA    Indications and Patient Condition  Indications for airway management: airway protection    Preoxygenated: yes  Mask difficulty assessment: 1 - vent by mask    Final Airway Details  Final airway type: endotracheal airway      Successful airway: ETT  Cuffed: yes   Successful intubation technique: direct laryngoscopy  Facilitating devices/methods: intubating stylet  Endotracheal tube insertion site: oral  Blade: Morales  Blade size: 2  ETT size (mm): 7.0  Cormack-Lehane Classification: grade IIa - partial view of glottis  Placement verified by: chest auscultation and capnometry   Cuff volume (mL): 7  Measured from: teeth  ETT/EBT  to teeth (cm): 21  Number of attempts at approach: 1  Assessment: lips, teeth, and gum same as pre-op and atraumatic intubation    Additional Comments  Small mouth opening

## 2024-11-06 NOTE — PAYOR COMM NOTE
"REF;  [id number]     [facility]   JONATHAN   [phone]  OR  FAX  [phone]    Una Hinson (44 y.o. Female)       Date of Birth   1980    Social Security Number       [address]   [address] [facility] [address]    Home Phone   [phone]    MRN   [id number]       Anabaptism   [facility]    Marital Status                               Admission Date   11/3/24    Admission Type   Emergency    Admitting Provider   Kathy Marie MD    Attending Provider   Kathy Marie MD    Department, Room/Bed   [facility] MOTHER BABY 2A, M201/1       Discharge Date       Discharge Disposition       Discharge Destination                                 Attending Provider: Kathy Marie MD    Allergies: Ketorolac Tromethamine, Tramadol    Isolation: None   Infection: None   Code Status: CPR    Ht: 157.5 cm (62\")   Wt: 54.9 kg (121 lb)    Admission Cmt: None   Principal Problem: Postoperative abscess [T81.49XA]                   Active Insurance as of 11/3/2024       Primary Coverage       Payor Plan Insurance Group Employer/Plan Group    [employer] PPO [id number]       Payor Plan Address Payor Plan Phone Number Payor Plan Fax Number Effective Dates    [address] [phone]  8/1/2023 - None Entered    [place] [address]         Subscriber Name Subscriber Birth Date Member ID       UNA HINSON 1980 [id number]                     Emergency Contacts        (Rel.) Home Phone Work Phone Mobile Phone    BROCK GOMEZ (Father) [phone] -- --        Chante Arora RN   Registered Nurse  Nursing     Plan of Care     Signed     Date of Service: 11/06/24 0620  Creation Time: 11/06/24 0658     Signed         Goal Outcome Evaluation:  Plan of Care Reviewed With: patient  Progress: improving  Outcome Evaluation: VSS. Temperature elevated to 100.9, Tylenol given. Voiding and ambulating. Pain controlled with po prn " medications per pt. IV abx and IVF continued. Pt NPO at midnight.                 Kallie Guerrero, RN   Registered Nurse  Nursing     Plan of Care     Signed     Date of Service: 11/05/24 1703  Creation Time: 11/05/24 1703     Signed         Goal Outcome Evaluation:  Plan of Care Reviewed With: patient, spouse  Progress: improving  Outcome Evaluation: VSS. Temp about mid morning, voiding and ambulating. Pain controlled with tylenol and motrin. IV abx given.  Patient to be NPO at midnight for surgery in AM.                 Plan: Patient currently comfortable.  Discussed patient with Dr. Marie and plan to evacuate the fluid collection in the suprapubic location tomorrow a.m. by a vaginal approach.  I believe this is appropriate due to her symptoms associated with this fluid collection.  The fluid could be residual abscess which requires to be drained.  Continue antibiotics are recommended.  Discussed course with the patient who is comfortable with this decision.        Goran Rdz MD  11/05/24  10:03 CST    Vital Signs (last day)       Date/Time Temp Temp src Pulse Resp BP Patient Position SpO2    11/06/24 0740 98.9 (37.2) Oral 78 14 113/69 Lying 98    11/06/24 0451 100 (37.8) Axillary -- -- -- -- --    11/06/24 0450 99.9 (37.7) Oral 91 16 118/72 Sitting 98    11/06/24 0016 97 (36.1) Axillary -- -- -- -- --    11/06/24 0015 98.7 (37.1) Oral 81 16 114/71 Sitting 97    11/05/24 2225 100.8 (38.2) Axillary -- -- -- -- --    11/05/24 2115 100.7 (38.2) Axillary -- -- -- -- --    11/05/24 2015 100.9 (38.3) Axillary 98 18 132/75 Sitting 99    11/05/24 1648 97.2 (36.2) Axillary 88 16 110/61 Sitting 99    11/05/24 1334 100.4 (38) Axillary -- -- -- -- --    11/05/24 1246 100.7 (38.2) Axillary -- -- -- -- --    11/05/24 1201 100.5 (38.1) Axillary 101 16 132/75 Lying 100    11/05/24 0727 98.7 (37.1) Oral 93 16 109/59 Sitting 100    11/05/24 0700 99.8 (37.7) Axillary -- -- -- -- --    11/05/24 0608 100.2 (37.9) Axillary -- --  -- -- --    11/05/24 0406 98.6 (37) Axillary 100 18 114/64 Lying 100    11/05/24 0030 97.4 (36.3) Axillary 80 20 94/61 Sitting 98          Oxygen Therapy (last day)       Date/Time SpO2 Device (Oxygen Therapy) Flow (L/min) (Oxygen Therapy) Oxygen Concentration (%) ETCO2 (mmHg)    11/06/24 0740 98 room air -- -- --    11/06/24 0450 98 room air -- -- --    11/06/24 0015 97 room air -- -- --    11/05/24 2015 99 room air -- -- --    11/05/24 1648 99 room air -- -- --    11/05/24 1201 100 room air -- -- --    11/05/24 0913 -- room air -- -- --    11/05/24 0727 100 room air -- -- --    11/05/24 0406 100 room air -- -- --    11/05/24 0030 98 room air -- -- --          Intake & Output (last day)         11/05 0701 11/06 0700 11/06 0701  11/07 0700    Urine (mL/kg/hr) 1790 (1.4)     Total Output 1790     Net -1790           Urine Unmeasured Occurrence 2 x           Current Facility-Administered Medications   Medication Dose Route Frequency Provider Last Rate Last Admin    acetaminophen (TYLENOL) tablet 650 mg  650 mg Oral Q4H PRN Lance Mcclain DO   650 mg at 11/06/24 0509    cyclobenzaprine (FLEXERIL) tablet 10 mg  10 mg Oral Q8H PRN Michelle Larson APRN        docusate sodium (COLACE) capsule 100 mg  100 mg Oral BID Kathy Marie MD   100 mg at 11/05/24 2028    heparin (porcine) 5000 UNIT/ML injection 5,000 Units  5,000 Units Subcutaneous Q12H Kathy Marie MD   5,000 Units at 11/05/24 2028    ibuprofen (ADVIL,MOTRIN) tablet 600 mg  600 mg Oral Q6H PRN Lance Mcclain DO   600 mg at 11/05/24 1330    ondansetron ODT (ZOFRAN-ODT) disintegrating tablet 4 mg  4 mg Oral Q6H PRN Lance Mcclain DO        Or    ondansetron (ZOFRAN) injection 4 mg  4 mg Intravenous Q6H PRN Lance Mcclain DO   4 mg at 11/04/24 1922    oxyCODONE (ROXICODONE) immediate release tablet 5 mg  5 mg Oral Q4H PRN Michelle Larson APRN   5 mg at 11/05/24 2231    piperacillin-tazobactam (ZOSYN) 3.375 g IVPB in 100 mL NS MBP (CD)  3.375 g  "Intravenous Q8H Kathy Marie MD   3.375 g at 11/06/24 0330    sodium chloride 0.9 % flush 10 mL  10 mL Intravenous PRN Krystyna Sánchez APRN        sodium chloride 0.9 % flush 10 mL  10 mL Intravenous Q12H Lance Mcclain DO   10 mL at 11/05/24 2038    sodium chloride 0.9 % infusion 40 mL  40 mL Intravenous PRN Lance Mcclain DO         Orders (last 24 hrs)        Start     Ordered    11/06/24 0801  CT Abdomen Pelvis With Contrast  1 Time Imaging         11/06/24 0801    11/06/24 0500  CBC & Differential  Once         11/05/24 0949    11/06/24 0500  CBC Auto Differential  PROCEDURE ONCE         11/05/24 2102 11/06/24 0001  NPO Diet NPO Type: Strict NPO  Diet Effective Midnight         11/05/24 0816    11/04/24 2158  cyclobenzaprine (FLEXERIL) tablet 10 mg  Every 8 Hours PRN         11/04/24 2203    11/04/24 2157  oxyCODONE (ROXICODONE) immediate release tablet 5 mg  Every 4 Hours PRN         11/04/24 2203    11/04/24 1045  docusate sodium (COLACE) capsule 100 mg  2 Times Daily         11/04/24 0949    11/04/24 0900  heparin (porcine) 5000 UNIT/ML injection 5,000 Units  Every 12 Hours Scheduled         11/04/24 0721    11/04/24 0300  piperacillin-tazobactam (ZOSYN) 3.375 g IVPB in 100 mL NS MBP (CD)  Every 8 Hours         11/03/24 2214 11/03/24 2300  sodium chloride 0.9 % flush 10 mL  Every 12 Hours Scheduled         11/03/24 2208    11/03/24 2300  sodium chloride 0.9 % infusion  Continuous         11/03/24 2208 11/03/24 2207  sodium chloride 0.9 % infusion 40 mL  As Needed         11/03/24 2208 11/03/24 2207  acetaminophen (TYLENOL) tablet 650 mg  Every 4 Hours PRN         11/03/24 2208    11/03/24 2207  ibuprofen (ADVIL,MOTRIN) tablet 600 mg  Every 6 Hours PRN         11/03/24 2208 11/03/24 2207  ondansetron ODT (ZOFRAN-ODT) disintegrating tablet 4 mg  Every 6 Hours PRN        Placed in \"Or\" Linked Group    11/03/24 2208 11/03/24 2207  ondansetron (ZOFRAN) injection 4 mg  Every 6 Hours " "PRN        Placed in \"Or\" Linked Group    24  sodium chloride 0.9 % flush 10 mL  As Needed        Placed in \"And\" Linked Group    24                     Physician Progress Notes (last 24 hours)        Kathy Marie MD at 24 0801           Sandra Alegria  : 1980  MRN: [id number]  CSN: [id number]    Post-operative Day #16 from TOTAL LAPAROSCOPIC HYSTERECTOMY with BSO, DaVinci, and POD #13 return to OR for wash-out of pelvic abscess    Hospital Day #4 for this readmission    Subjective   Medications being utilized for pain control: acetaminophen and ibuprofen (OTC)  and oxycodone.  Patient returned to the emergency room  when she had fevers, chills, and nausea.  During the patient's previous hospitalization she had Zosyn every 8 hours for several days.  She was then discharged home on Augmentin and just completed that antibiotic 3 days prior to coming back.  The patient says she was doing well, with just typical postop soreness, until .      Yesterday morning the patient looked very uncomfortable and her abdomen was more tender than the previous day.  This morning, however, the patient is feeling somewhat better and says that during the evening she started having a thick vaginal discharge that is flesh-colored to grayish in color.  She does not have any vaginal discomfort, and specifically denies itching or burning.   Patient currently n.p.o. for possible return to the OR this morning to open up her vaginal cuff.  This may be unnecessary if the pocket of fluid at the vaginal cuff is already draining spontaneously.  A chest x-ray at the time of admission was negative for any findings.  UA was negative for any sign of     No radiology results for the last day       Objective     Min/max vitals past 24 hours:   Temp  Min: 97 °F (36.1 °C)  Max: 100.9 °F (38.3 °C)  BP  Min: 110/61  Max: 132/75  Pulse  Min: 78  Max: 101  Resp  Min: 14  Max: 18    "     I/O last 3 completed shifts:  In: -   Out: 1790 [Urine:1790]    General: well developed; well nourished  no acute distress   Lungs:  Abdomen: breathing is unlabored  soft, with less tenderness than yesterday; no masses  incisions are healing, clean, dry, intact, and without drainage   Pelvic: Not performed   Ext: Calves NT     WBC   Date/Time Value Ref Range Status   11/06/2024 0513 11.72 (H) 3.40 - 10.80 10*3/mm3 Final   11/02/2023 1500 4.98 3.40 - 10.80 10*3/mm3 Final     Hemoglobin   Date/Time Value Ref Range Status   11/06/2024 0513 7.3 (L) 12.0 - 15.9 g/dL Final     Hematocrit   Date/Time Value Ref Range Status   11/06/2024 0513 24.7 (L) 34.0 - 46.6 % Final     Platelets   Date/Time Value Ref Range Status   11/06/2024 0513 534 (H) 140 - 450 10*3/mm3 Final       Assessment   Post-op Day #16 and 12 S/P DaVinci TOTAL LAPAROSCOPIC HYSTERECTOMY with BSO, followed a few days later by return to OR for pelvic wash-out and adhesiolysis  Patient feeling a little better this morning than yesterday  WBCs elevated at 18.7 on admission.  WBCs 13.4 yesterday and then 11.72  this morning.  Fever up to 101.9 Monday night, but only up to 100.9 last evening  Plan   Radiologist was consulted yesterday to see if the patient's 4X6 cm cuff abscess was amenable to drainage by interventional radiology.  Radiology said this was not possible, so the patient was made n.p.o. after midnight last evening so that we could plan to go back to the operating room this morning at open of her vaginal cuff.  In the meantime, the patient has had spontaneous vaginal drainage of thick fluid.  We have discussed the following options:   - expectant management, with continued IV antibiotics since the patient's cuff abscess may be draining spontaneously   -Going ahead with the previous plan and proceeding to the OR.  Opening her vaginal cuff even more might allow fluid to drain more quickly, but may be unnecessary   -Ordering a CT scan this morning.   If the previously 4 x 6 cm cuff abscess is now smaller, this will confirm that the patient is having spontaneous drainage and we will not return to the operating room.  If it is the same or larger, then we will proceed as previously planned.    The patient likes the third option in the past and thinks this makes sense.  A CT scan of the abdomen and pelvis with IV contrast has been ordered.  Further management will be based on those results.    General Surgery following and I appreciate their input.  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 provider found    Patient Care Team:  Rashawn Mcgovern MD as PCP - General (Family Medicine)    Subjective      Chief complaint: Fevers, chills    History of present illness:    Aubree Alegria had several episodes of a spiked temp of greater than 100 which was controlled with medication.  The steps were associated with nausea.  This a.m. she feels better after taking her Motrin and Tylenol to drop her temps.  Has some complaints of abdominal pain in the suprapubic location this a.m.    Review of Systems:    Review of Systems - General ROS: positive for  - fever  Respiratory ROS: no cough, shortness of breath, or wheezing  Cardiovascular ROS: no chest pain or dyspnea on exertion  Gastrointestinal ROS: positive for - abdominal pain and nausea    History  Past Medical History:   Diagnosis Date    Spinal headache    ,   Past Surgical History:   Procedure Laterality Date    APPENDECTOMY      BREAST BIOPSY Bilateral     benign     SECTION      x2    DIAGNOSTIC LAPAROSCOPY N/A 10/24/2024     Procedure: DIAGNOSTIC LAPAROSCOPY WITH DAVINCI ROBOT;  Surgeon: Sami Villalobos MD;  Location:  [facility] OR;  Service: Obstetrics/Gynecology;  Laterality: N/A;    DIAGNOSTIC LAPAROSCOPY N/A 10/24/2024    Procedure: DIAGNOSTIC LAPAROSCOPY WITH DAVINCI ROBOT;  Surgeon: Goran Rdz MD;  Location:  [facility] OR;  Service: General;  Laterality: N/A;    ECTOPIC PREGNANCY      TOTAL LAPAROSCOPIC HYSTERECTOMY SALPINGO OOPHORECTOMY Bilateral 10/21/2024    Procedure: TOTAL LAPAROSCOPIC HYSTERECTOMY BILATERAL SALPINGOOPHORECTOMY WITH DAVINCI ROBOT;  Surgeon: Kathy Marie MD;  Location:  [facility] OR;  Service: Robotics - DaVinci;  Laterality: Bilateral;    TUBAL ABDOMINAL LIGATION      WISDOM TOOTH EXTRACTION     ,   Family History   Problem Relation Age of Onset    Kidney cancer Maternal Aunt     Hypertension Father     No Known Problems Mother     No Known Problems Sister     Leukemia Paternal Grandmother     Breast cancer Paternal Aunt 40    Ovarian cancer Neg Hx     Uterine cancer Neg Hx     Colon cancer Neg Hx     Melanoma Neg Hx     Prostate cancer Neg Hx    ,   Social History     Tobacco Use    Smoking status: Some Days     Types: Cigarettes     Passive exposure: Current    Smokeless tobacco: Never   Vaping Use    Vaping status: Never Used   Substance Use Topics    Alcohol use: Never    Drug use: Never   ,   Medications Prior to Admission   Medication Sig Dispense Refill Last Dose/Taking    acetaminophen (TYLENOL) 500 MG tablet Take 1 tablet by mouth Every 6 (Six) Hours As Needed for Mild Pain for up to 20 days. 60 tablet 0     naloxone (NARCAN) 4 MG/0.1ML nasal spray Call 911. Don't prime. [place] in 1 nostril for overdose. Repeat in 2-3 minutes in other nostril if no or minimal breathing/responsiveness. (Patient not taking: Reported on 10/30/2024) 2 each 0     ondansetron ODT (ZOFRAN-ODT) 4 MG disintegrating tablet Place 1 tablet on the tongue Every 8 (Eight) Hours As Needed for Nausea or Vomiting. 30 tablet 1      [] oxyCODONE (ROXICODONE) 5 MG immediate release tablet Take 1 tablet by mouth Every 8 (Eight) Hours As Needed for Moderate Pain for up to 7 days. 20 tablet 0     potassium chloride (KLOR-CON M20) 20 MEQ CR tablet Take 1 tablet by mouth 2 (Two) Times a Day. (Patient not taking: Reported on 10/30/2024) 6 tablet 0     and Allergies:  Ketorolac tromethamine and Tramadol    Current Facility-Administered Medications:     acetaminophen (TYLENOL) tablet 650 mg, 650 mg, Oral, Q4H PRN, Lance Mcclain DO, 650 mg at 24    cyclobenzaprine (FLEXERIL) tablet 10 mg, 10 mg, Oral, Q8H PRN, Michelle Larson APRN    docusate sodium (COLACE) capsule 100 mg, 100 mg, Oral, BID, Kathy Marie MD, 100 mg at 24    heparin (porcine) 5000 UNIT/ML injection 5,000 Units, 5,000 Units, Subcutaneous, Q12H, Kathy Marie MD, 5,000 Units at 24 09    ibuprofen (ADVIL,MOTRIN) tablet 600 mg, 600 mg, Oral, Q6H PRN, Lance Mcclain DO, 600 mg at 24 0610    ondansetron ODT (ZOFRAN-ODT) disintegrating tablet 4 mg, 4 mg, Oral, Q6H PRN **OR** ondansetron (ZOFRAN) injection 4 mg, 4 mg, Intravenous, Q6H PRN, Lance Mcclain DO, 4 mg at 24    oxyCODONE (ROXICODONE) immediate release tablet 5 mg, 5 mg, Oral, Q4H PRN, Michelle Larson APRN, 5 mg at 24    piperacillin-tazobactam (ZOSYN) 3.375 g IVPB in 100 mL NS MBP (CD), 3.375 g, Intravenous, Q8H, Kathy Marie MD, 3.375 g at 24 0244    [COMPLETED] Insert Peripheral IV, , , Once **AND** sodium chloride 0.9 % flush 10 mL, 10 mL, Intravenous, PRN, Krystyna Sánchez, APRN    sodium chloride 0.9 % flush 10 mL, 10 mL, Intravenous, Q12H, Lance Mcclain DO    sodium chloride 0.9 % infusion 40 mL, 40 mL, Intravenous, PRN, Lance Mcclain DO    sodium chloride 0.9 % infusion, 125 mL/hr, Intravenous, Continuous, Lance Mcclain DO, Last Rate: 125 mL/hr at 24, 125 mL/hr at 24    Objective     Vital Signs    Temp:  [97.4 °F (36.3 °C)-101.9 °F (38.8 °C)] 98.7 °F (37.1 °C)  Heart Rate:  [] 93  Resp:  [16-20] 16  BP: ()/(59-83) 109/59    Physical Exam:  Physical Exam  Constitutional:       General: She is not in acute distress.     Appearance: She is normal weight. She is not ill-appearing or diaphoretic.   Abdominal:      General: A surgical scar is present. Bowel sounds are normal. There is no distension.      Palpations: Abdomen is soft.      Tenderness: There is abdominal tenderness in the suprapubic area. There is no guarding or rebound.      Comments: Surgical site is clean dry and intact   Neurological:      Mental Status: She is alert.          Results Review:    Lab Results (last 24 hours)       Procedure Component Value Units Date/Time    CBC & Differential [[id number]]  (Abnormal) Collected: 11/05/24 0841    Specimen: Blood Updated: 11/05/24 0858    Narrative:      The following orders were created for panel order CBC & Differential.  Procedure                               Abnormality         Status                     ---------                               -----------         ------                     CBC Auto Differential[[id number]]        Abnormal            Final result                 Please view results for these tests on the individual orders.    CBC Auto Differential [[id number]]  (Abnormal) Collected: 11/05/24 0841    Specimen: Blood Updated: 11/05/24 0858     WBC 13.39 10*3/mm3      RBC 3.50 10*6/mm3      Hemoglobin 7.9 g/dL      Hematocrit 26.6 %      MCV 76.0 fL      MCH 22.6 pg      MCHC 29.7 g/dL      RDW 18.0 %      RDW-SD 49.1 fl      MPV 10.6 fL      Platelets 540 10*3/mm3      Neutrophil % 78.8 %      Lymphocyte % 13.5 %      Monocyte % 5.4 %      Eosinophil % 1.3 %      Basophil % 0.4 %      Immature Grans % 0.6 %      Neutrophils, Absolute 10.55 10*3/mm3      Lymphocytes, Absolute 1.81 10*3/mm3      Monocytes, Absolute 0.72 10*3/mm3      Eosinophils, Absolute 0.17 10*3/mm3       Basophils, Absolute 0.06 10*3/mm3      Immature Grans, Absolute 0.08 10*3/mm3      nRBC 0.0 /100 WBC     Comprehensive Metabolic Panel [[id number]]  (Abnormal) Collected: 11/05/24 0527    Specimen: Blood Updated: 11/05/24 0627     Glucose 99 mg/dL      BUN 7 mg/dL      Creatinine 0.64 mg/dL      Sodium 140 mmol/L      Potassium 3.7 mmol/L      Chloride 109 mmol/L      CO2 24.0 mmol/L      Calcium 7.9 mg/dL      Total Protein 6.0 g/dL      Albumin 3.0 g/dL      ALT (SGPT) 20 U/L      AST (SGOT) 13 U/L      Alkaline Phosphatase 56 U/L      Total Bilirubin 0.2 mg/dL      Globulin 3.0 gm/dL      A/G Ratio 1.0 g/dL      BUN/Creatinine Ratio 10.9     Anion Gap 7.0 mmol/L      eGFR 110.5 mL/min/1.73     Narrative:      GFR Normal >60  Chronic Kidney Disease <60  Kidney Failure <15      CBC & Differential [[id number]]  (Abnormal) Collected: 11/05/24 0527    Specimen: Blood Updated: 11/05/24 0606    Narrative:      The following orders were created for panel order CBC & Differential.  Procedure                               Abnormality         Status                     ---------                               -----------         ------                     CBC Auto Differential[[id number]]        Abnormal            Final result                 Please view results for these tests on the individual orders.    CBC Auto Differential [[id number]]  (Abnormal) Collected: 11/05/24 0527    Specimen: Blood Updated: 11/05/24 0606     WBC 13.97 10*3/mm3      RBC 3.27 10*6/mm3      Hemoglobin 7.5 g/dL      Hematocrit 24.7 %      MCV 75.5 fL      MCH 22.9 pg      MCHC 30.4 g/dL      RDW 18.3 %      RDW-SD 50.0 fl      MPV 11.6 fL      Platelets 457 10*3/mm3      Neutrophil % 75.1 %      Lymphocyte % 14.7 %      Monocyte % 6.7 %      Eosinophil % 1.5 %      Basophil % 0.6 %      Immature Grans % 1.4 %      Neutrophils, Absolute 10.49 10*3/mm3      Lymphocytes, Absolute 2.06 10*3/mm3      Monocytes, Absolute 0.94 10*3/mm3      Eosinophils,  Absolute 0.21 10*3/mm3      Basophils, Absolute 0.08 10*3/mm3      Immature Grans, Absolute 0.19 10*3/mm3      nRBC 0.0 /100 WBC     Blood Culture - Blood, Arm, Right [[id number]]  (Normal) Collected: 11/03/24 2039    Specimen: Blood from Arm, Right Updated: 11/04/24 2100     Blood Culture No growth at 24 hours    Blood Culture - Blood, Arm, Left [[id number]]  (Normal) Collected: 11/03/24 1943    Specimen: Blood from Arm, Left Updated: 11/04/24 2031     Blood Culture No growth at 24 hours          Imaging Results (Last 24 Hours)       ** No results found for the last 24 hours. **              ASSESSMENT/PLAN   Diagnosis Plan   1. Postoperative abscess        2. Fever, unspecified fever cause        3. History of hysterectomy           Plan: Patient currently comfortable.  Discussed patient with Dr. Marie and plan to evacuate the fluid collection in the suprapubic location tomorrow a.m. by a vaginal approach.  I believe this is appropriate due to her symptoms associated with this fluid collection.  The fluid could be residual abscess which requires to be drained.  Continue antibiotics are recommended.  Discussed course with the patient who is comfortable with this decision.      Goran Rdz MD  11/05/24  10:03 CST            Electronically signed by Goran Rdz MD at 11/05/24 1016

## 2024-11-06 NOTE — PLAN OF CARE
Goal Outcome Evaluation:  Plan of Care Reviewed With: patient        Progress: improving  Outcome Evaluation: VSS. Temperature elevated to 100.9, Tylenol given. Voiding and ambulating. Pain controlled with po prn medications per pt. IV abx and IVF continued. Pt NPO at midnight.

## 2024-11-06 NOTE — PROGRESS NOTES
Sandra Alegria  : 1980  MRN: [id number]  [facility]: [id number]    Post-operative Day #16 from TOTAL LAPAROSCOPIC HYSTERECTOMY with BSO, DaVinci, and POD #13 return to OR for wash-out of pelvic abscess    Hospital Day #4 for this readmission    Subjective   Medications being utilized for pain control: acetaminophen and ibuprofen (OTC)  and oxycodone.  Patient returned to the emergency room  when she had fevers, chills, and nausea.  During the patient's previous hospitalization she had Zosyn every 8 hours for several days.  She was then discharged home on Augmentin and just completed that antibiotic 3 days prior to coming back.  The patient says she was doing well, with just typical postop soreness, until .      Yesterday morning the patient looked very uncomfortable and her abdomen was more tender than the previous day.  This morning, however, the patient is feeling somewhat better and says that during the evening she started having a thick vaginal discharge that is flesh-colored to grayish in color.  She does not have any vaginal discomfort, and specifically denies itching or burning.   Patient currently n.p.o. for possible return to the OR this morning to open up her vaginal cuff.  This may be unnecessary if the pocket of fluid at the vaginal cuff is already draining spontaneously.  A chest x-ray at the time of admission was negative for any findings.  UA was negative for any sign of     No radiology results for the last day        Objective     Min/max vitals past 24 hours:   Temp  Min: 97 °F (36.1 °C)  Max: 100.9 °F (38.3 °C)  BP  Min: 110/61  Max: 132/75  Pulse  Min: 78  Max: 101  Resp  Min: 14  Max: 18        I/O last 3 completed shifts:  In: -   Out:  [Urine:179]    General: well developed; well nourished  no acute distress   Lungs:  Abdomen: breathing is unlabored  soft, with less tenderness than yesterday; no masses  incisions are healing, clean, dry, intact, and without  drainage   Pelvic: Not performed   Ext: Calves NT     WBC   Date/Time Value Ref Range Status   11/06/2024 0513 11.72 (H) 3.40 - 10.80 10*3/mm3 Final   11/02/2023 1500 4.98 3.40 - 10.80 10*3/mm3 Final     Hemoglobin   Date/Time Value Ref Range Status   11/06/2024 0513 7.3 (L) 12.0 - 15.9 g/dL Final     Hematocrit   Date/Time Value Ref Range Status   11/06/2024 0513 24.7 (L) 34.0 - 46.6 % Final     Platelets   Date/Time Value Ref Range Status   11/06/2024 0513 534 (H) 140 - 450 10*3/mm3 Final        Assessment   Post-op Day #16 and 12 S/P DaVinci TOTAL LAPAROSCOPIC HYSTERECTOMY with BSO, followed a few days later by return to OR for pelvic wash-out and adhesiolysis  Patient feeling a little better this morning than yesterday  WBCs elevated at 18.7 on admission.  WBCs 13.4 yesterday and then 11.72  this morning.  Fever up to 101.9 Monday night, but only up to 100.9 last evening   Plan   Radiologist was consulted yesterday to see if the patient's 4X6 cm cuff abscess was amenable to drainage by interventional radiology.  Radiology said this was not possible, so the patient was made n.p.o. after midnight last evening so that we could plan to go back to the operating room this morning at open of her vaginal cuff.  In the meantime, the patient has had spontaneous vaginal drainage of thick fluid.  We have discussed the following options:   - expectant management, with continued IV antibiotics since the patient's cuff abscess may be draining spontaneously   -Going ahead with the previous plan and proceeding to the OR.  Opening her vaginal cuff even more might allow fluid to drain more quickly, but may be unnecessary   -Ordering a CT scan this morning.  If the previously 4 x 6 cm cuff abscess is now smaller, this will confirm that the patient is having spontaneous drainage and we will not return to the operating room.  If it is the same or larger, then we will proceed as previously planned.    The patient likes the third  option in the past and thinks this makes sense.  A CT scan of the abdomen and pelvis with IV contrast has been ordered.  Further management will be based on those results.    General Surgery following and I appreciate their input.  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

## 2024-11-06 NOTE — ANESTHESIA PREPROCEDURE EVALUATION
Anesthesia Evaluation     Patient summary reviewed   no history of anesthetic complications:   NPO Solid Status: > 8 hours  NPO Liquid Status: > 8 hours           Airway   Mallampati: II  TM distance: >3 FB  Neck ROM: full  No difficulty expected  Dental - normal exam     Pulmonary    (+) a smoker Current,  Cardiovascular - negative cardio ROS  Exercise tolerance: excellent (>7 METS)        Neuro/Psych- negative ROS  GI/Hepatic/Renal/Endo - negative ROS     Musculoskeletal (-) negative ROS    Abdominal    Substance History      OB/GYN          Other                    Anesthesia Plan    ASA 2     general     intravenous induction     Anesthetic plan, risks, benefits, and alternatives have been provided, discussed and informed consent has been obtained with: patient.    CODE STATUS:    Code Status (Patient has no pulse and is not breathing): CPR (Attempt to Resuscitate)  Medical Interventions (Patient has pulse or is breathing): Full Support

## 2024-11-06 NOTE — NURSING NOTE
At around 1700, patient informed this nurse about having some vaginal discharge when she went to the bathroom. Patient was told to call the nurse next time she went to the bathroom so we can assess the drainage.

## 2024-11-06 NOTE — PROGRESS NOTES
General Surgery     Referring Provider: No ref. provider found    Patient Care Team:  Rashawn Mcgovern MD as PCP - General (Family Medicine)    Subjective      Chief complaint: Fevers, chills    History of present illness:    Aubree Alegria had an episode of a spiked temp of greater than 100 which was controlled with medication last night.  She did reports of having spontaneous discharge from her vagina that appeared to be exudative.  She stated that she feels better today with no complaints of abdominal pain or nausea or vomiting.    Review of Systems:    Review of Systems - General ROS: Negative for- fever  Respiratory ROS: no cough, shortness of breath, or wheezing  Gastrointestinal ROS: Negative for - abdominal pain and nausea    History  Past Medical History:   Diagnosis Date    Spinal headache    ,   Past Surgical History:   Procedure Laterality Date    APPENDECTOMY      BREAST BIOPSY Bilateral     benign     SECTION      x2    DIAGNOSTIC LAPAROSCOPY N/A 10/24/2024    Procedure: DIAGNOSTIC LAPAROSCOPY WITH DAVINCI ROBOT;  Surgeon: Sami Villalobos MD;  Location: [facility];  Service: Obstetrics/Gynecology;  Laterality: N/A;    DIAGNOSTIC LAPAROSCOPY N/A 10/24/2024    Procedure: DIAGNOSTIC LAPAROSCOPY WITH DAVINCI ROBOT;  Surgeon: Goran Rdz MD;  Location: [facility] OR;  Service: General;  Laterality: N/A;    ECTOPIC PREGNANCY      TOTAL LAPAROSCOPIC HYSTERECTOMY SALPINGO OOPHORECTOMY Bilateral 10/21/2024    Procedure: TOTAL LAPAROSCOPIC HYSTERECTOMY BILATERAL SALPINGOOPHORECTOMY WITH DAVINCI ROBOT;  Surgeon: Kathy Marie MD;  Location: [facility] OR;  Service: Robotics - DaVinci;  Laterality: Bilateral;    TUBAL ABDOMINAL LIGATION      WISDOM TOOTH EXTRACTION     ,   Family History   Problem Relation Age of Onset    Kidney cancer Maternal Aunt     Hypertension Father     No Known Problems Mother     No Known Problems Sister     Leukemia Paternal Grandmother     Breast cancer Paternal Aunt 40     Ovarian cancer Neg Hx     Uterine cancer Neg Hx     Colon cancer Neg Hx     Melanoma Neg Hx     Prostate cancer Neg Hx    ,   Social History     Tobacco Use    Smoking status: Some Days     Types: Cigarettes     Passive exposure: Current    Smokeless tobacco: Never   Vaping Use    Vaping status: Never Used   Substance Use Topics    Alcohol use: Never    Drug use: Never   ,   Medications Prior to Admission   Medication Sig Dispense Refill Last Dose/Taking    acetaminophen (TYLENOL) 500 MG tablet Take 1 tablet by mouth Every 6 (Six) Hours As Needed for Mild Pain for up to 20 days. 60 tablet 0     naloxone (NARCAN) 4 MG/0.1ML nasal spray Call 911. Don't prime. [place] in 1 nostril for overdose. Repeat in 2-3 minutes in other nostril if no or minimal breathing/responsiveness. (Patient not taking: Reported on 10/30/2024) 2 each 0     ondansetron ODT (ZOFRAN-ODT) 4 MG disintegrating tablet Place 1 tablet on the tongue Every 8 (Eight) Hours As Needed for Nausea or Vomiting. 30 tablet 1     [] oxyCODONE (ROXICODONE) 5 MG immediate release tablet Take 1 tablet by mouth Every 8 (Eight) Hours As Needed for Moderate Pain for up to 7 days. 20 tablet 0     potassium chloride (KLOR-CON M20) 20 MEQ CR tablet Take 1 tablet by mouth 2 (Two) Times a Day. (Patient not taking: Reported on 10/30/2024) 6 tablet 0     and Allergies:  Ketorolac tromethamine and Tramadol    Current Facility-Administered Medications:     acetaminophen (TYLENOL) tablet 650 mg, 650 mg, Oral, Q4H PRN, Lance Mcclain DO, 650 mg at 24 0841    cyclobenzaprine (FLEXERIL) tablet 10 mg, 10 mg, Oral, Q8H PRN, Michelle Larson APRN    docusate sodium (COLACE) capsule 100 mg, 100 mg, Oral, BID, Kathy Marie MD, 100 mg at 24 0841    heparin (porcine) 5000 UNIT/ML injection 5,000 Units, 5,000 Units, Subcutaneous, Q12H, Kathy Marie MD, 5,000 Units at 24 0842    ibuprofen (ADVIL,MOTRIN) tablet 600 mg, 600 mg, Oral, Q6H PRN, Lance Mcclain  A DO, 600 mg at 11/06/24 0841    ondansetron ODT (ZOFRAN-ODT) disintegrating tablet 4 mg, 4 mg, Oral, Q6H PRN **OR** ondansetron (ZOFRAN) injection 4 mg, 4 mg, Intravenous, Q6H PRN, Lance Mcclain DO, 4 mg at 11/04/24 1922    oxyCODONE (ROXICODONE) immediate release tablet 5 mg, 5 mg, Oral, Q4H PRN, Michelle Larson APRN, 5 mg at 11/05/24 2231    piperacillin-tazobactam (ZOSYN) 3.375 g IVPB in 100 mL NS MBP (CD), 3.375 g, Intravenous, Q8H, Kathy Marie MD, 3.375 g at 11/06/24 1038    [COMPLETED] Insert Peripheral IV, , , Once **AND** sodium chloride 0.9 % flush 10 mL, 10 mL, Intravenous, PRN, Krystyna Sánchez APRN    sodium chloride 0.9 % flush 10 mL, 10 mL, Intravenous, Q12H, Lance Mcclain DO, 10 mL at 11/05/24 2038    sodium chloride 0.9 % infusion 40 mL, 40 mL, Intravenous, PRN, Lance Mcclain DO    Objective     Vital Signs   Temp:  [97 °F (36.1 °C)-100.9 °F (38.3 °C)] 98.9 °F (37.2 °C)  Heart Rate:  [] 78  Resp:  [14-18] 14  BP: (110-132)/(61-75) 113/69    Physical Exam:  Physical Exam  Constitutional:       General: She is not in acute distress.     Appearance: She is normal weight. She is not ill-appearing or diaphoretic.   Abdominal:      General: A surgical scar is present. Bowel sounds are normal. There is no distension.      Palpations: Abdomen is soft.      Tenderness: There is no abdominal tenderness. There is no guarding or rebound.      Comments: Surgical site is clean dry and intact   Neurological:      Mental Status: She is alert.        Results Review:    Lab Results (last 24 hours)       Procedure Component Value Units Date/Time    CBC & Differential [[id number]]  (Abnormal) Collected: 11/06/24 0513    Specimen: Blood Updated: 11/06/24 0544    Narrative:      The following orders were created for panel order CBC & Differential.  Procedure                               Abnormality         Status                     ---------                               -----------          ------                     CBC Auto Differential[[id number]]        Abnormal            Final result                 Please view results for these tests on the individual orders.    CBC Auto Differential [[id number]]  (Abnormal) Collected: 11/06/24 0513    Specimen: Blood Updated: 11/06/24 0544     WBC 11.72 10*3/mm3      RBC 3.24 10*6/mm3      Hemoglobin 7.3 g/dL      Hematocrit 24.7 %      MCV 76.2 fL      MCH 22.5 pg      MCHC 29.6 g/dL      RDW 18.0 %      RDW-SD 50.2 fl      MPV 11.0 fL      Platelets 534 10*3/mm3      Neutrophil % 71.3 %      Lymphocyte % 18.9 %      Monocyte % 7.4 %      Eosinophil % 1.4 %      Basophil % 0.4 %      Immature Grans % 0.6 %      Neutrophils, Absolute 8.36 10*3/mm3      Lymphocytes, Absolute 2.21 10*3/mm3      Monocytes, Absolute 0.87 10*3/mm3      Eosinophils, Absolute 0.16 10*3/mm3      Basophils, Absolute 0.05 10*3/mm3      Immature Grans, Absolute 0.07 10*3/mm3      nRBC 0.0 /100 WBC     Blood Culture - Blood, Arm, Right [[id number]]  (Normal) Collected: 11/03/24 2039    Specimen: Blood from Arm, Right Updated: 11/05/24 2100     Blood Culture No growth at 2 days    Blood Culture - Blood, Arm, Left [[id number]]  (Normal) Collected: 11/03/24 1943    Specimen: Blood from Arm, Left Updated: 11/05/24 2031     Blood Culture No growth at 2 days          Imaging Results (Last 24 Hours)       Procedure Component Value Units Date/Time    CT Abdomen Pelvis With Contrast [[id number]] Collected: 11/06/24 1020     Updated: 11/06/24 1033    Narrative:      EXAM/TECHNIQUE: CT abdomen and pelvis with IV contrast     INDICATION: post-hysterectomy cuff abscess, seems to be draining  spontaneously and need to compare size to previous study;  T81.49XA-Infection following a procedure, other surgical site, initial  encounter; R50.9-Fever, unspecified; 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 Automated exposure control was also utilized  to  decrease patient radiation dose.     FINDINGS:     Increased size of rim-enhancing collection in the deep midline pelvis  abutting the vaginal cuff and rectum. Collection measures 6.8 x 4.5 x  3.8 cm (previously 6.1 x 2.9 x 2.3 cm). Surrounding fat stranding is  noted. Postoperative change of hysterectomy.     Mild atelectasis in both lung bases.     No suspicious focal liver lesion. No gallstone or gallbladder wall  thickening. No biliary ductal dilatation. Pancreas, spleen, and adrenal  glands are unremarkable. No solid renal mass. No urolithiasis or  hydronephrosis. No focal bladder wall thickening. Tiny locule of gas in  the urinary bladder lumen.     No colonic wall thickening or pericolonic fat stranding. No small bowel  distention or evidence of active small bowel inflammation. No ascites or  free pelvic fluid. Nonaneurysmal abdominal aorta. Widely patent SMA.  There is again a thrombus within the right ovarian vein, without  significant change in size/extent.     No new or enlarging abdominal or pelvic lymph nodes. Bilateral  borderline enlarged internal iliac lymph nodes are presumably reactive.  No acute abdominal wall soft tissue abnormality. No acute osseous  finding.       Impression:         1.  Increased size of rim-enhancing collection in the deep midline  pelvis abutting the vaginal cuff and rectum.     2.  Redemonstrated right ovarian vein thrombus without significant  change in size/extent.     This report was signed and finalized on 11/6/2024 10:30 AM by Dr. Lucio Ng MD.                 ASSESSMENT/PLAN   Diagnosis Plan   1. Postoperative abscess        2. Fever, unspecified fever cause        3. History of hysterectomy           Plan: Patient currently comfortable.  Slight increase in fluid size per CT scan.  Patient fluid collection is located above or abutting the vaginal cuff.  Clinically the patient has been showing significant signs of improvement with decrease in abdominal  pain, no nausea or vomiting as well as a white count trending down consistently.  It would be reasonable to consider a vaginal exam to assess its communication with the abscess versus continuing this modality of treatment allowing the vagina to continue to drain this fluid collection.        Goran Rdz MD  11/06/24  10:40 CST

## 2024-11-06 NOTE — PROGRESS NOTES
Went to see patient to discuss CT results.  Although the patient is still having drainage fluid from her vagina, the physical measurement of the abscess at the top of her vaginal cuff has not decreased.  We have discussed continuing to allow it to drain spontaneously versus return to the OR.  The patient understands that either is an acceptable course of action, but prefers to go back to the operating room and have her cuff explored so that maybe the drainage of all the abscess fluid will occur more quickly.  The procedures been described to the patient.  All of her questions have been answered.  The patient has remained n.p.o. since midnight, in case she needed to have surgery.  I have already contacted the main OR and the patient is anticipated to go to surgery at 115.

## 2024-11-06 NOTE — PLAN OF CARE
Problem: Adult Inpatient Plan of Care  Goal: Plan of Care Review  Outcome: Progressing  Flowsheets (Taken 11/6/2024 2852)  Outcome Evaluation: VSS WNL, dtv after surgery.  afebrile.  no drainage on aye pad.  no c/o pain.  IV abx continues   Goal Outcome Evaluation:              Outcome Evaluation: VSS WNL, dtv after surgery.  afebrile.  no drainage on aye pad.  no c/o pain.  IV abx continues                              no

## 2024-11-06 NOTE — OP NOTE
Sandra Alegria  : 1980  MRN: [id number]  CSN: [id number]  Date: 2024      Operative Note    Drainage of vaginal cuff abscess, vaginal approach    Pre-op Diagnosis:  Vaginal cuff abscess, 2 weeks s/p hysterectomy   Post-op Diagnosis:  same        Procedure: Drainage of vaginal cuff abscess     Surgeon: Surgeon(s):  Kathy Marie MD       Anesthesia: General   Estimated Blood Loss: minimal           ABx: Zosyn already hanging   Specimens:  none   Findings: Drainage of purulent fluid from vaginal cuff     Complications: None       Description of Procedure:       Patient was sent to the operating room where she was prepped in the usual fashion, in the dorsolithotomy position using yellowfin stirrups.  A alonso catheter was placed.  A weighted speculum was used posteriorly, and a Bennett retractor anteriorly, in order to visualize the vaginal cuff.  Vaginal cuff was well-approximated, although there was purulent material seen draining between the sutures, on the patient's left side.  Long Mcdonough scissors were used to snip a single stitch on that end of the incision and an increased amount of purulent material was seen to drain from the opening.  All of the purulent material was suctioned away, and totaled 40 mL at the end of the case.  The rest of the incision was probed and intact.  The small opening on the left half of the vaginal cuff was also probed and the deeper layer found to be intact.  4 interrupted 3-0 Vicryl sutures were loosely placed to provide the vaginal cuff with extra-support as it was healing.     The patient tolerated the procedure well.  Sponge lap and needle counts were correct ×2.      Kathy Marie MD   2024  13:43 CST

## 2024-11-07 VITALS
TEMPERATURE: 98 F | BODY MASS INDEX: 22.26 KG/M2 | WEIGHT: 121 LBS | HEIGHT: 62 IN | OXYGEN SATURATION: 99 % | DIASTOLIC BLOOD PRESSURE: 68 MMHG | SYSTOLIC BLOOD PRESSURE: 132 MMHG | HEART RATE: 80 BPM | RESPIRATION RATE: 18 BRPM

## 2024-11-07 LAB
BASOPHILS # BLD AUTO: 0.04 10*3/MM3 (ref 0–0.2)
BASOPHILS NFR BLD AUTO: 0.5 % (ref 0–1.5)
DEPRECATED RDW RBC AUTO: 48.7 FL (ref 37–54)
EOSINOPHIL # BLD AUTO: 0.06 10*3/MM3 (ref 0–0.4)
EOSINOPHIL NFR BLD AUTO: 0.8 % (ref 0.3–6.2)
ERYTHROCYTE [DISTWIDTH] IN BLOOD BY AUTOMATED COUNT: 17.8 % (ref 12.3–15.4)
HCT VFR BLD AUTO: 24.8 % (ref 34–46.6)
HGB BLD-MCNC: 7.5 G/DL (ref 12–15.9)
IMM GRANULOCYTES # BLD AUTO: 0.04 10*3/MM3 (ref 0–0.05)
IMM GRANULOCYTES NFR BLD AUTO: 0.5 % (ref 0–0.5)
LYMPHOCYTES # BLD AUTO: 2.92 10*3/MM3 (ref 0.7–3.1)
LYMPHOCYTES NFR BLD AUTO: 36.6 % (ref 19.6–45.3)
MCH RBC QN AUTO: 22.7 PG (ref 26.6–33)
MCHC RBC AUTO-ENTMCNC: 30.2 G/DL (ref 31.5–35.7)
MCV RBC AUTO: 75.2 FL (ref 79–97)
MONOCYTES # BLD AUTO: 0.54 10*3/MM3 (ref 0.1–0.9)
MONOCYTES NFR BLD AUTO: 6.8 % (ref 5–12)
NEUTROPHILS NFR BLD AUTO: 4.38 10*3/MM3 (ref 1.7–7)
NEUTROPHILS NFR BLD AUTO: 54.8 % (ref 42.7–76)
NRBC BLD AUTO-RTO: 0 /100 WBC (ref 0–0.2)
PLATELET # BLD AUTO: 551 10*3/MM3 (ref 140–450)
PMV BLD AUTO: 10.6 FL (ref 6–12)
RBC # BLD AUTO: 3.3 10*6/MM3 (ref 3.77–5.28)
WBC NRBC COR # BLD AUTO: 7.98 10*3/MM3 (ref 3.4–10.8)

## 2024-11-07 PROCEDURE — 99024 POSTOP FOLLOW-UP VISIT: CPT | Performed by: OBSTETRICS & GYNECOLOGY

## 2024-11-07 PROCEDURE — 25010000002 PIPERACILLIN SOD-TAZOBACTAM PER 1 G: Performed by: OBSTETRICS & GYNECOLOGY

## 2024-11-07 PROCEDURE — 25010000002 HEPARIN (PORCINE) PER 1000 UNITS: Performed by: OBSTETRICS & GYNECOLOGY

## 2024-11-07 PROCEDURE — 85025 COMPLETE CBC W/AUTO DIFF WBC: CPT | Performed by: OBSTETRICS & GYNECOLOGY

## 2024-11-07 RX ORDER — HEPARIN SODIUM 5000 [USP'U]/ML
5000 INJECTION, SOLUTION INTRAVENOUS; SUBCUTANEOUS EVERY 12 HOURS SCHEDULED
Qty: 14 ML | Refills: 0 | Status: SHIPPED | OUTPATIENT
Start: 2024-11-07

## 2024-11-07 RX ORDER — METRONIDAZOLE 500 MG/1
500 TABLET ORAL 2 TIMES DAILY
Qty: 14 TABLET | Refills: 0 | Status: SHIPPED | OUTPATIENT
Start: 2024-11-07 | End: 2024-11-14

## 2024-11-07 RX ORDER — DOXYCYCLINE 100 MG/1
100 CAPSULE ORAL 2 TIMES DAILY
Qty: 14 CAPSULE | Refills: 0 | Status: SHIPPED | OUTPATIENT
Start: 2024-11-07

## 2024-11-07 RX ADMIN — DOCUSATE SODIUM 100 MG: 100 CAPSULE, LIQUID FILLED ORAL at 09:28

## 2024-11-07 RX ADMIN — PIPERACILLIN SODIUM AND TAZOBACTAM SODIUM 3.38 G: 3; .375 INJECTION, POWDER, LYOPHILIZED, FOR SOLUTION INTRAVENOUS at 03:54

## 2024-11-07 RX ADMIN — IBUPROFEN 600 MG: 600 TABLET, FILM COATED ORAL at 03:55

## 2024-11-07 RX ADMIN — HEPARIN SODIUM 5000 UNITS: 5000 INJECTION, SOLUTION INTRAVENOUS; SUBCUTANEOUS at 09:28

## 2024-11-07 NOTE — PROGRESS NOTES
Mattoonevelyn Alegria  : 1980  MRN: [id number]  CSN: [id number]    Post-operative Day #17 from TOTAL LAPAROSCOPIC HYSTERECTOMY with BSO, DaVinci, and POD #14 return to OR for wash-out of pelvic abscess    Hospital Day #5 for this readmission    Now POD#1 from return to OR for drainage of vaginal cuff abscess, vaginal approach    Subjective   Medications being utilized for pain control: acetaminophen and ibuprofen (OTC)  and oxycodone.  Patient returned to the operating room yesterday for surgical drainage of the vaginal cuff abscess, vaginal approach.  Procedure was uncomplicated and resulted in drainage of about 40 mL of pus from the vaginal cuff.  This morning, patient says she is feeling some better.  Pelvic pain is less.  Abdominal tenderness is less.  She is tolerating a regular diet.  Bladder and bowel function are normal.  Aubree does not think there is still drainage from her vagina, and says she is having just scant spotting    CT Abdomen Pelvis With Contrast    Result Date: 2024   1.  Increased size of rim-enhancing collection in the deep midline pelvis abutting the vaginal cuff and rectum.  2.  Redemonstrated right ovarian vein thrombus without significant change in size/extent.  This report was signed and finalized on 2024 10:30 AM by Dr. Lucio Ng MD.           Objective     Min/max vitals past 24 hours:   Temp  Min: 97.9 °F (36.6 °C)  Max: 98.6 °F (37 °C)  BP  Min: 85/48  Max: 141/76  Pulse  Min: 54  Max: 81  Resp  Min: 12  Max: 18        I/O last 3 completed shifts:  In: 500 [I.V.:400; IV Piggyback:100]  Out: 1600 [Urine:1600]    General: well developed; well nourished  no acute distress   Lungs:  Abdomen: breathing is unlabored  soft, with less tenderness than yesterday; no masses  incisions are healing, clean, dry, intact, and without drainage   Pelvic: Not performed   Ext: Calves NT     WBC   Date/Time Value Ref Range Status   2024 0513 11.72 (H) 3.40 - 10.80  10*3/mm3 Final   11/02/2023 1500 4.98 3.40 - 10.80 10*3/mm3 Final     Hemoglobin   Date/Time Value Ref Range Status   11/06/2024 0513 7.3 (L) 12.0 - 15.9 g/dL Final     Hematocrit   Date/Time Value Ref Range Status   11/06/2024 0513 24.7 (L) 34.0 - 46.6 % Final     Platelets   Date/Time Value Ref Range Status   11/06/2024 0513 534 (H) 140 - 450 10*3/mm3 Final        Assessment   Post-op Day #1 drainage of vaginal cuff abscess, vaginal approach.  Patient feeling a little better this morning than yesterday  Was not planning to do more labs this morning since abscess has been drained and WBCs were only 11.7 yesterday.  Patient requesting labs, so CBC has been ordered.  Patient overall feeling better, but very hesitant about going home since she has already had to return to the hospital twice.  This hesitation is very understandable.  Patient has been reassured that there is no rash.  She feels like ordering a new CBC will help her to decide whether or not she feels comfortable going home.  Additionally, the patient has been advised that she can wait to see how she is feeling later in the day and maybe go home this evening.  Discharge disposition undecided at this time  Plan     Possible discharge later today.  If not, then should definitely be good tomorrow.  I do not want patient to be discharged until she is very comfortable with that plan.    General Surgery following and I appreciate their input.  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

## 2024-11-07 NOTE — PAYOR COMM NOTE
"Ref  [id number]   Fax  [phone]     Una Hinson (44 y.o. Female)       Date of Birth   1980    Social Security Number       Address   [address] [facility] [address]    Home Phone   [phone]    MRN   [id number]       [facility]    Marital Status                               Admission Date   11/3/24    Admission Type   Emergency    Admitting Provider   Kathy Marie MD    Attending Provider       Department, Room/Bed   [facility] 2A, M201/1       Discharge Date   2024    Discharge Disposition   Home or Self Care    Discharge Destination   Home                              Attending Provider: (none)   Allergies: Ketorolac Tromethamine, Tramadol    Isolation: None   Infection: None   Code Status: CPR    Ht: 157.5 cm (62\")   Wt: 54.9 kg (121 lb)    Admission Cmt: None   Principal Problem: Postoperative abscess [T81.49XA]                   Active Insurance as of 11/3/2024       Primary Coverage       Payor Plan Insurance Group Employer/Plan Group    [employer] BLUE [employer] PPO [id number]       Payor Plan Address Payor Plan Phone Number Payor Plan Fax Number Effective Dates    [address] [phone]  2023 - None Entered    [facility] [id number]         Subscriber Name Subscriber Birth Date Member ID       UNA HINSON 1980 [id number]                     Emergency Contacts        (Rel.) Home Phone Work Phone Mobile Phone    BROCK GOMEZ (Father) [phone] -- --                 Discharge Summary        Kathy Marie MD at 24 0943          [facility]  Una VALENTINO Hinson  : 1980  MRN: [id number]  CSN: [id number]    Discharge Summary      Date of Admission: 11/3/2024   Date of Discharge: 2024   Discharge Diagnosis: Post-hysterectomy cuff abscess   Procedures Performed: Procedure(s):  VAGINAL CUFF DEHISCENCE REPAIR      Consults: General surgery   Brief History: Patient is a 44 " y.o.who presented 13 days status post robotic hysterectomy and 10 days status post return to the operating room for laparoscopic washout of pelvic abscess.   Hospital Course: Patient was admitted to the hospital and managed on IV antibiotics until postop day #16 from her hysterectomy.  Although the patient's white count had decreased, she was still not feeling great.  Additionally, that same day she started having purulent vaginal discharge.  The patient was taken back to the operating room, again, for drainage of a vaginal cuff abscess, vaginal approach.  The procedure was uncomplicated and did yield a large amount of purulent material.  Today, the patient is feeling much better.  Her WBCs are completely normal, at 8.  The patient has gotten up this morning, walked in the whaley, had a shower, and had breakfast.  With a normal white count, the patient is requesting to be discharged home and feels comfortable going home on oral antibiotics   Pending Studies: None.     Condition at discharge: gradually improving   Discharge Medications:    Your medication list        START taking these medications        Instructions Last Dose Given Next Dose Due   doxycycline 100 MG capsule  Commonly known as: VIBRAMYCIN      Take 1 capsule by mouth 2 (Two) Times a Day.       heparin (porcine) 5000 UNIT/ML injection      Inject 1 mL under the skin into the appropriate area as directed Every 12 (Twelve) Hours. Indications: Prevention of Unwanted Clot in Veins       metroNIDAZOLE 500 MG tablet  Commonly known as: Flagyl      Take 1 tablet by mouth 2 (Two) Times a Day for 7 days.              CONTINUE taking these medications        Instructions Last Dose Given Next Dose Due   acetaminophen 500 MG tablet  Commonly known as: TYLENOL      Take 1 tablet by mouth Every 6 (Six) Hours As Needed for Mild Pain for up to 20 days.       naloxone 4 MG/0.1ML nasal spray  Commonly known as: NARCAN      Call 911. Don't prime. Spray in 1 nostril for  overdose. Repeat in 2-3 minutes in other nostril if no or minimal breathing/responsiveness.              STOP taking these medications      ondansetron ODT 4 MG disintegrating tablet  Commonly known as: ZOFRAN-ODT        oxyCODONE 5 MG immediate release tablet  Commonly known as: ROXICODONE               ASK your doctor about these medications        Instructions Last Dose Given Next Dose Due   potassium chloride 20 MEQ CR tablet  Commonly known as: KLOR-CON M20      Take 1 tablet by mouth 2 (Two) Times a Day.                 Where to Get Your Medications        These medications were sent to [id number] - [place], [place] - [address] [facility] AT [address] - [phone]  - [phone]  [address] [place] [id number]      Phone: [phone]   doxycycline 100 MG capsule  heparin (porcine) 5000 UNIT/ML injection  metroNIDAZOLE 500 MG tablet        Discharge Disposition: home   Follow-up: Future Appointments   Date Time Provider Department Center   11/27/2024  8:45 AM Ozzy Marie MD [facility]            This note has been electronically signed.    Ozzy Marie MD  November 7, 2024  09:43 CST    Electronically signed by Ozzy Marie MD at 11/07/24 0936       Discharge Order (From admission, onward)       Start     Ordered    11/07/24 0941  Discharge patient  Once        Expected Discharge Date: 11/07/24   Discharge Disposition: Home or Self Care   Physician of Record for Attribution - Please select from Treatment Team: OZZY MARIE [[id number]]   Review needed by CMO to determine Physician of Record: No      Question Answer Comment   Physician of Record for Attribution - Please select from Treatment Team OZZY MARIE    Review needed by CMO to determine Physician of Record No        11/07/24 0941

## 2024-11-07 NOTE — DISCHARGE SUMMARY
Sandra Alegria  : 1980  MRN: [id number]  CSN: [id number]    Discharge Summary      Date of Admission: 11/3/2024   Date of Discharge: 2024   Discharge Diagnosis: Post-hysterectomy cuff abscess   Procedures Performed: Procedure(s):  VAGINAL CUFF DEHISCENCE REPAIR      Consults: General surgery   Brief History: Patient is a 44 y.o.who presented 13 days status post robotic hysterectomy and 10 days status post return to the operating room for laparoscopic washout of pelvic abscess.   Hospital Course: Patient was admitted to the hospital and managed on IV antibiotics until postop day #16 from her hysterectomy.  Although the patient's white count had decreased, she was still not feeling great.  Additionally, that same day she started having purulent vaginal discharge.  The patient was taken back to the operating room, again, for drainage of a vaginal cuff abscess, vaginal approach.  The procedure was uncomplicated and did yield a large amount of purulent material.  Today, the patient is feeling much better.  Her WBCs are completely normal, at 8.  The patient has gotten up this morning, walked in the whaley, had a shower, and had breakfast.  With a normal white count, the patient is requesting to be discharged home and feels comfortable going home on oral antibiotics   Pending Studies: None.     Condition at discharge: gradually improving   Discharge Medications:    Your medication list        START taking these medications        Instructions Last Dose Given Next Dose Due   doxycycline 100 MG capsule  Commonly known as: VIBRAMYCIN      Take 1 capsule by mouth 2 (Two) Times a Day.       heparin (porcine) 5000 UNIT/ML injection      Inject 1 mL under the skin into the appropriate area as directed Every 12 (Twelve) Hours. Indications: Prevention of Unwanted Clot in Veins       metroNIDAZOLE 500 MG tablet  Commonly known as: Flagyl      Take 1 tablet by mouth 2 (Two) Times a Day for 7 days.               CONTINUE taking these medications        Instructions Last Dose Given Next Dose Due   acetaminophen 500 MG tablet  Commonly known as: TYLENOL      Take 1 tablet by mouth Every 6 (Six) Hours As Needed for Mild Pain for up to 20 days.       naloxone 4 MG/0.1ML nasal spray  Commonly known as: NARCAN      Call 911. Don't prime. [place] in 1 nostril for overdose. Repeat in 2-3 minutes in other nostril if no or minimal breathing/responsiveness.              STOP taking these medications      ondansetron ODT 4 MG disintegrating tablet  Commonly known as: ZOFRAN-ODT        oxyCODONE 5 MG immediate release tablet  Commonly known as: ROXICODONE               ASK your doctor about these medications        Instructions Last Dose Given Next Dose Due   potassium chloride 20 MEQ CR tablet  Commonly known as: KLOR-CON M20      Take 1 tablet by mouth 2 (Two) Times a Day.                 Where to Get Your Medications        These medications were sent to [id number] - [place], [place] - [address] [facility] AT [address] - [phone] [facility] [phone]   [address] [place] [address]      Phone: [phone]   doxycycline 100 MG capsule  heparin (porcine) 5000 UNIT/ML injection  metroNIDAZOLE 500 MG tablet        Discharge Disposition: home   Follow-up: Future Appointments   Date Time Provider Department Center   11/27/2024  8:45 AM Kathy Marie MD [facility]            This note has been electronically signed.    Kathy Marie MD  November 7, 2024  09:43 CST

## 2024-11-07 NOTE — ANESTHESIA POSTPROCEDURE EVALUATION
"Patient: Aubree Alegria    Procedure Summary       Date: 11/06/24 Room / Location:  [facility] OR 06 /  [facility]    Anesthesia Start: 1306 Anesthesia Stop: 1352    Procedure: VAGINAL CUFF DEHISCENCE REPAIR (Vagina) Diagnosis:     Surgeons: Kathy Marie MD Provider: Smita Cox CRNA    Anesthesia Type: general ASA Status: 2            Anesthesia Type: general    Vitals  Vitals Value Taken Time   /76 11/06/24 1440   Temp 98.6 °F (37 °C) 11/06/24 1445   Pulse 61 11/06/24 1445   Resp 15 11/06/24 1445   SpO2 99 % 11/06/24 1445           Post Anesthesia Care and Evaluation    Patient location during evaluation: PACU  Patient participation: complete - patient participated  Level of consciousness: awake and alert  Pain management: adequate    Airway patency: patent  Anesthetic complications: No anesthetic complications  PONV Status: none  Cardiovascular status: acceptable and hemodynamically stable  Respiratory status: acceptable  Hydration status: acceptable    Comments: Blood pressure 111/67, pulse 74, temperature 98.1 °F (36.7 °C), temperature source Oral, resp. rate 18, height 157.5 cm (62\"), weight 54.9 kg (121 lb), last menstrual period 10/07/2024, SpO2 98%, not currently breastfeeding.    Patient discharged from PACU based upon Ashley score. Please see RN notes for further details    "

## 2024-11-07 NOTE — PROGRESS NOTES
Patient has now been up out of bed, walked in the whaley, and had a shower.  Additionally, the patient CBC has returned and her WBCs are 7.98.  She is feeling well and requesting to go home.  Discharge order being placed, and medications sent to pharmacy.    WBC   Date Value Ref Range Status   11/07/2024 7.98 3.40 - 10.80 10*3/mm3 Final     RBC   Date Value Ref Range Status   11/07/2024 3.30 (L) 3.77 - 5.28 10*6/mm3 Final     Hemoglobin   Date Value Ref Range Status   11/07/2024 7.5 (L) 12.0 - 15.9 g/dL Final     Hematocrit   Date Value Ref Range Status   11/07/2024 24.8 (L) 34.0 - 46.6 % Final     MCV   Date Value Ref Range Status   11/07/2024 75.2 (L) 79.0 - 97.0 fL Final     MCH   Date Value Ref Range Status   11/07/2024 22.7 (L) 26.6 - 33.0 pg Final     MCHC   Date Value Ref Range Status   11/07/2024 30.2 (L) 31.5 - 35.7 g/dL Final     RDW   Date Value Ref Range Status   11/07/2024 17.8 (H) 12.3 - 15.4 % Final     RDW-SD   Date Value Ref Range Status   11/07/2024 48.7 37.0 - 54.0 fl Final     MPV   Date Value Ref Range Status   11/07/2024 10.6 6.0 - 12.0 fL Final     Platelets   Date Value Ref Range Status   11/07/2024 551 (H) 140 - 450 10*3/mm3 Final     Neutrophil %   Date Value Ref Range Status   11/07/2024 54.8 42.7 - 76.0 % Final     Lymphocyte %   Date Value Ref Range Status   11/07/2024 36.6 19.6 - 45.3 % Final     Monocyte %   Date Value Ref Range Status   11/07/2024 6.8 5.0 - 12.0 % Final     Eosinophil %   Date Value Ref Range Status   11/07/2024 0.8 0.3 - 6.2 % Final     Basophil %   Date Value Ref Range Status   11/07/2024 0.5 0.0 - 1.5 % Final     Immature Grans %   Date Value Ref Range Status   11/07/2024 0.5 0.0 - 0.5 % Final     Neutrophils, Absolute   Date Value Ref Range Status   11/07/2024 4.38 1.70 - 7.00 10*3/mm3 Final     Lymphocytes, Absolute   Date Value Ref Range Status   11/07/2024 2.92 0.70 - 3.10 10*3/mm3 Final     Monocytes, Absolute   Date Value Ref Range Status   11/07/2024 0.54 0.10  - 0.90 10*3/mm3 Final     Eosinophils, Absolute   Date Value Ref Range Status   11/07/2024 0.06 0.00 - 0.40 10*3/mm3 Final     Basophils, Absolute   Date Value Ref Range Status   11/07/2024 0.04 0.00 - 0.20 10*3/mm3 Final     Immature Grans, Absolute   Date Value Ref Range Status   11/07/2024 0.04 0.00 - 0.05 10*3/mm3 Final     nRBC   Date Value Ref Range Status   11/07/2024 0.0 0.0 - 0.2 /100 WBC Final

## 2024-11-07 NOTE — PROGRESS NOTES
General Surgery     Referring Provider: No ref. provider found    Patient Care Team:  Rashawn Mcgovern MD as PCP - General (Family Medicine)    Subjective      Chief complaint:     History of present illness:    Aubree Alegria reports she is doing much better.  She has some soreness in the suprapubic location.  She denies nausea or vomiting.  She denies having fevers or chills last night.  She stated that she feels better today with no complaints of abdominal pain or nausea or vomiting.    Review of Systems:    Review of Systems - General ROS: Negative for- fever      History  Past Medical History:   Diagnosis Date    Spinal headache    ,   Past Surgical History:   Procedure Laterality Date    APPENDECTOMY      BREAST BIOPSY Bilateral     benign     SECTION      x2    DIAGNOSTIC LAPAROSCOPY N/A 10/24/2024    Procedure: DIAGNOSTIC LAPAROSCOPY WITH DAVINCI ROBOT;  Surgeon: Sami Villalobos MD;  Location: [facility] OR;  Service: Obstetrics/Gynecology;  Laterality: N/A;    DIAGNOSTIC LAPAROSCOPY N/A 10/24/2024    Procedure: DIAGNOSTIC LAPAROSCOPY WITH DAVINCI ROBOT;  Surgeon: Goran Rdz MD;  Location:  [facility] OR;  Service: General;  Laterality: N/A;    ECTOPIC PREGNANCY      TOTAL LAPAROSCOPIC HYSTERECTOMY SALPINGO OOPHORECTOMY Bilateral 10/21/2024    Procedure: TOTAL LAPAROSCOPIC HYSTERECTOMY BILATERAL SALPINGOOPHORECTOMY WITH DAVINCI ROBOT;  Surgeon: Kathy Marie MD;  Location: [facility] OR;  Service: Robotics - DaVinci;  Laterality: Bilateral;    TUBAL ABDOMINAL LIGATION      WISDOM TOOTH EXTRACTION     ,   Family History   Problem Relation Age of Onset    Kidney cancer Maternal Aunt     Hypertension Father     No Known Problems Mother     No Known Problems Sister     Leukemia Paternal Grandmother     Breast cancer Paternal Aunt 40    Ovarian cancer Neg Hx     Uterine cancer Neg Hx     Colon cancer Neg Hx     Melanoma Neg Hx     Prostate cancer Neg Hx    ,   Social History     Tobacco Use    Smoking  status: Some Days     Types: Cigarettes     Passive exposure: Current    Smokeless tobacco: Never   Vaping Use    Vaping status: Never Used   Substance Use Topics    Alcohol use: Never    Drug use: Never   ,   Medications Prior to Admission   Medication Sig Dispense Refill Last Dose/Taking    acetaminophen (TYLENOL) 500 MG tablet Take 1 tablet by mouth Every 6 (Six) Hours As Needed for Mild Pain for up to 20 days. 60 tablet 0     naloxone (NARCAN) 4 MG/0.1ML nasal spray Call 911. Don't prime. [place] in 1 nostril for overdose. Repeat in 2-3 minutes in other nostril if no or minimal breathing/responsiveness. (Patient not taking: Reported on 10/30/2024) 2 each 0     ondansetron ODT (ZOFRAN-ODT) 4 MG disintegrating tablet Place 1 tablet on the tongue Every 8 (Eight) Hours As Needed for Nausea or Vomiting. 30 tablet 1     [] oxyCODONE (ROXICODONE) 5 MG immediate release tablet Take 1 tablet by mouth Every 8 (Eight) Hours As Needed for Moderate Pain for up to 7 days. 20 tablet 0     potassium chloride (KLOR-CON M20) 20 MEQ CR tablet Take 1 tablet by mouth 2 (Two) Times a Day. (Patient not taking: Reported on 10/30/2024) 6 tablet 0     and Allergies:  Ketorolac tromethamine and Tramadol    Current Facility-Administered Medications:     acetaminophen (TYLENOL) tablet 650 mg, 650 mg, Oral, Q4H PRN, Kathy Marie MD, 650 mg at 24 0841    cyclobenzaprine (FLEXERIL) tablet 10 mg, 10 mg, Oral, Q8H PRN, Kathy Marie MD    docusate sodium (COLACE) capsule 100 mg, 100 mg, Oral, BID, Kathy Marie MD, 100 mg at 24 210    heparin (porcine) 5000 UNIT/ML injection 5,000 Units, 5,000 Units, Subcutaneous, Q12H, Kathy Marie MD, 5,000 Units at 24    ibuprofen (ADVIL,MOTRIN) tablet 600 mg, 600 mg, Oral, Q6H PRN, Kathy Marie MD, 600 mg at 24 0355    lactated ringers infusion, 100 mL/hr, Intravenous, Continuous, Laina Choe MD    ondansetron ODT (ZOFRAN-ODT) disintegrating tablet 4  mg, 4 mg, Oral, Q6H PRN **OR** ondansetron (ZOFRAN) injection 4 mg, 4 mg, Intravenous, Q6H PRN, Kathy Marie MD, 4 mg at 11/04/24 1922    oxyCODONE (ROXICODONE) immediate release tablet 5 mg, 5 mg, Oral, Q4H PRN, Kathy Marie MD, 5 mg at 11/05/24 2231    piperacillin-tazobactam (ZOSYN) 3.375 g IVPB in 100 mL NS MBP (CD), 3.375 g, Intravenous, Q8H, Kathy Marie MD, 3.375 g at 11/07/24 0354    [COMPLETED] Insert Peripheral IV, , , Once **AND** sodium chloride 0.9 % flush 10 mL, 10 mL, Intravenous, PRN, Kathy Marie MD    sodium chloride 0.9 % flush 10 mL, 10 mL, Intravenous, Q12H, Kathy Marie MD, 10 mL at 11/05/24 2038    sodium chloride 0.9 % infusion 40 mL, 40 mL, Intravenous, PRN, Kathy Marie MD    Objective     Vital Signs   Temp:  [97.9 °F (36.6 °C)-98.6 °F (37 °C)] 98.1 °F (36.7 °C)  Heart Rate:  [54-81] 74  Resp:  [12-18] 18  BP: ()/(48-76) 111/67    Physical Exam:  Physical Exam  Constitutional:       General: She is not in acute distress.     Appearance: She is normal weight. She is not ill-appearing or diaphoretic.   Abdominal:      General: A surgical scar is present. Bowel sounds are normal. There is no distension.      Palpations: Abdomen is soft.      Tenderness: There is abdominal tenderness in the suprapubic area. There is no guarding or rebound.      Comments: Surgical site is clean dry and intact   Neurological:      Mental Status: She is alert.          Results Review:    Lab Results (last 24 hours)       Procedure Component Value Units Date/Time    CBC & Differential [[id number]]  (Abnormal) Collected: 11/07/24 0818    Specimen: Blood Updated: 11/07/24 0829    Narrative:      The following orders were created for panel order CBC & Differential.  Procedure                               Abnormality         Status                     ---------                               -----------         ------                     CBC Auto Differential[[id number]]        Abnormal             Final result                 Please view results for these tests on the individual orders.    CBC Auto Differential [[id number]]  (Abnormal) Collected: 11/07/24 0818    Specimen: Blood Updated: 11/07/24 0829     WBC 7.98 10*3/mm3      RBC 3.30 10*6/mm3      Hemoglobin 7.5 g/dL      Hematocrit 24.8 %      MCV 75.2 fL      MCH 22.7 pg      MCHC 30.2 g/dL      RDW 17.8 %      RDW-SD 48.7 fl      MPV 10.6 fL      Platelets 551 10*3/mm3      Neutrophil % 54.8 %      Lymphocyte % 36.6 %      Monocyte % 6.8 %      Eosinophil % 0.8 %      Basophil % 0.5 %      Immature Grans % 0.5 %      Neutrophils, Absolute 4.38 10*3/mm3      Lymphocytes, Absolute 2.92 10*3/mm3      Monocytes, Absolute 0.54 10*3/mm3      Eosinophils, Absolute 0.06 10*3/mm3      Basophils, Absolute 0.04 10*3/mm3      Immature Grans, Absolute 0.04 10*3/mm3      nRBC 0.0 /100 WBC     Blood Culture - Blood, Arm, Right [[id number]]  (Normal) Collected: 11/03/24 2039    Specimen: Blood from Arm, Right Updated: 11/06/24 2100     Blood Culture No growth at 3 days    Blood Culture - Blood, Arm, Left [[id number]]  (Normal) Collected: 11/03/24 1943    Specimen: Blood from Arm, Left Updated: 11/06/24 2030     Blood Culture No growth at 3 days          Imaging Results (Last 24 Hours)       Procedure Component Value Units Date/Time    CT Abdomen Pelvis With Contrast [[id number]] Collected: 11/06/24 1020     Updated: 11/06/24 1033    Narrative:      EXAM/TECHNIQUE: CT abdomen and pelvis with IV contrast     INDICATION: post-hysterectomy cuff abscess, seems to be draining  spontaneously and need to compare size to previous study;  T81.49XA-Infection following a procedure, other surgical site, initial  encounter; R50.9-Fever, unspecified; 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 Automated exposure control was also utilized to  decrease patient radiation dose.     FINDINGS:     Increased size of rim-enhancing  collection in the deep midline pelvis  abutting the vaginal cuff and rectum. Collection measures 6.8 x 4.5 x  3.8 cm (previously 6.1 x 2.9 x 2.3 cm). Surrounding fat stranding is  noted. Postoperative change of hysterectomy.     Mild atelectasis in both lung bases.     No suspicious focal liver lesion. No gallstone or gallbladder wall  thickening. No biliary ductal dilatation. Pancreas, spleen, and adrenal  glands are unremarkable. No solid renal mass. No urolithiasis or  hydronephrosis. No focal bladder wall thickening. Tiny locule of gas in  the urinary bladder lumen.     No colonic wall thickening or pericolonic fat stranding. No small bowel  distention or evidence of active small bowel inflammation. No ascites or  free pelvic fluid. Nonaneurysmal abdominal aorta. Widely patent SMA.  There is again a thrombus within the right ovarian vein, without  significant change in size/extent.     No new or enlarging abdominal or pelvic lymph nodes. Bilateral  borderline enlarged internal iliac lymph nodes are presumably reactive.  No acute abdominal wall soft tissue abnormality. No acute osseous  finding.       Impression:         1.  Increased size of rim-enhancing collection in the deep midline  pelvis abutting the vaginal cuff and rectum.     2.  Redemonstrated right ovarian vein thrombus without significant  change in size/extent.     This report was signed and finalized on 11/6/2024 10:30 AM by Dr. Lucio Ng MD.                 ASSESSMENT/PLAN   Diagnosis Plan   1. Postoperative abscess        2. Fever, unspecified fever cause        3. History of hysterectomy           Plan: Patient currently comfortable.  Slight increase in fluid size per CT scan yesterday which was addressed yesterday with OB/GYN's procedure.    Clinically the patient has been showing significant signs of improvement with decrease in abdominal pain, no nausea or vomiting as well as resolution of leukocytosis.    I will support discharge  home soon per OB/GYN's recommendations and discharge instructions.  She may follow-up with me as needed.  Thank you for allowing me to participate with the care of your patient.      Goran Rdz MD  11/07/24  08:44 CST

## 2024-11-07 NOTE — PLAN OF CARE
Goal Outcome Evaluation:  Plan of Care Reviewed With: patient        Progress: improving  Outcome Evaluation: VSS. voiding and ambulating. afebrile. tolerating iv abx. pain controlled with prn pain medication.

## 2024-11-08 ENCOUNTER — READMISSION MANAGEMENT (OUTPATIENT)
Dept: CALL CENTER | Facility: HOSPITAL | Age: 44
End: 2024-11-08
Payer: COMMERCIAL

## 2024-11-08 LAB
BACTERIA SPEC AEROBE CULT: NORMAL
BACTERIA SPEC AEROBE CULT: NORMAL

## 2024-11-08 NOTE — OUTREACH NOTE
Prep Survey      Flowsheet Row Responses   [facility] facility patient discharged from? [place]   Is LACE score < 7 ? No   Eligibility Readm Mgmt   Discharge diagnosis VAGINAL CUFF DEHISCENCE REPAIR   Does the patient have one of the following disease processes/diagnoses(primary or secondary)? General Surgery   Does the patient have Home health ordered? No   Is there a DME ordered? No   Prep survey completed? Yes            Arlen BURGOS - Registered Nurse

## 2024-11-11 ENCOUNTER — READMISSION MANAGEMENT (OUTPATIENT)
Dept: CALL CENTER | Facility: HOSPITAL | Age: 44
End: 2024-11-11
Payer: COMMERCIAL

## 2024-11-11 ENCOUNTER — TELEPHONE (OUTPATIENT)
Dept: OBSTETRICS AND GYNECOLOGY | Age: 44
End: 2024-11-11
Payer: COMMERCIAL

## 2024-11-11 NOTE — OUTREACH NOTE
General Surgery Week 1 Survey      Flowsheet Row Responses   [facility] facility patient discharged from? [place]   Does the patient have one of the following disease processes/diagnoses(primary or secondary)? General Surgery   Week 1 attempt successful? No   Unsuccessful attempts Attempt 1            Mora Mathew Registered Nurse

## 2024-11-11 NOTE — TELEPHONE ENCOUNTER
Attempted to reach pt to see how she is feeling since surgery. Left message for pt to return call to office.

## 2024-11-14 ENCOUNTER — READMISSION MANAGEMENT (OUTPATIENT)
Dept: CALL CENTER | Facility: HOSPITAL | Age: 44
End: 2024-11-14
Payer: COMMERCIAL

## 2024-11-14 NOTE — OUTREACH NOTE
General Surgery Week 1 Survey      Flowsheet Row Responses   [facility] facility patient discharged from? [place]   Does the patient have one of the following disease processes/diagnoses(primary or secondary)? General Surgery   Week 1 attempt successful? No   Unsuccessful attempts Attempt 2            Shruthi Mathew Registered Nurse

## 2024-11-20 ENCOUNTER — READMISSION MANAGEMENT (OUTPATIENT)
Dept: CALL CENTER | Facility: HOSPITAL | Age: 44
End: 2024-11-20
Payer: COMMERCIAL

## 2024-11-20 NOTE — OUTREACH NOTE
General Surgery Week 3 Survey      Flowsheet Row Responses   [facility] facility patient discharged from? [facility]   Does the patient have one of the following disease processes/diagnoses(primary or secondary)? General Surgery   Revoke Decline to participate            Geri JACOBSON - Licensed Nurse

## 2024-11-27 ENCOUNTER — OFFICE VISIT (OUTPATIENT)
Dept: OBSTETRICS AND GYNECOLOGY | Age: 44
End: 2024-11-27
Payer: COMMERCIAL

## 2024-11-27 VITALS
BODY MASS INDEX: 22.08 KG/M2 | HEIGHT: 62 IN | WEIGHT: 120 LBS | DIASTOLIC BLOOD PRESSURE: 82 MMHG | SYSTOLIC BLOOD PRESSURE: 118 MMHG

## 2024-11-27 DIAGNOSIS — Z90.710 S/P LAPAROSCOPIC HYSTERECTOMY: ICD-10-CM

## 2024-11-27 DIAGNOSIS — E89.40 SURGICAL MENOPAUSE: Primary | ICD-10-CM

## 2024-11-27 RX ORDER — ACETAMINOPHEN 325 MG/1
650 TABLET ORAL EVERY 6 HOURS PRN
COMMUNITY

## 2024-11-27 RX ORDER — OXYCODONE AND ACETAMINOPHEN 5; 325 MG/1; MG/1
1 TABLET ORAL EVERY 6 HOURS PRN
COMMUNITY

## 2024-11-27 RX ORDER — IBUPROFEN 200 MG
200 TABLET ORAL EVERY 6 HOURS PRN
COMMUNITY

## 2024-11-27 NOTE — PROGRESS NOTES
"Subjective   Chief Complaint   Patient presents with    Post-op     Pt here today for 3 week post op vaginal dehiscence repair. Pt voices she is feeling better but still not great. Pt voices still having pain in abdomen and pelvis. Pt voices feeling bloated a lot of the time. Pt voices no other concerns.      Aubree Alegria is a 44 y.o. year old  presenting to be seen for her post-operative visit.  She had a Da Stevan TLH with bilateral salpingo-oophorectomy  5 weeks ago, followed by return to OR 3 days later for abdominal wash-out with gyn and general surgery.  Patient then required another return to OR for abscess at vaginal one week after that.  Patient was discharged home on oral antibiotics.  Currently she reports the she is feeling progressively better, but not yet back to baseline.  Patient has returned to work, but is exhausted and hurting by the afternoon.  She feels like abdomen is still very bloated sometimes.  Aubree is still occasionally requiring percocet, especially in the evening.     No Additional Complaints Reported    The following portions of the patient's history were reviewed and updated as appropriate:current medications and allergies    Review of Systems      Objective   /82   Ht 157.5 cm (62\")   Wt 54.4 kg (120 lb)   LMP 10/07/2024 (Approximate) Comment: Neg HCG  BMI 21.95 kg/m²     General:  well developed; well nourished  no acute distress  mentation appropriate   Abdomen: soft, non-tender; no masses  incisions are healed   Pelvis:      Final Diagnosis   Uterus, cervix, bilateral fallopian tubes and ovaries, hysterectomy and bilateral salpingo-oophorectomy:  Chronic cervicitis with nabothian cysts.  Secretory endometrium.  Diffuse adenomyosis.  Multiple myometrial leiomyomas (0.5 to 7 cm).  Unremarkable left and right fallopian tubes and fimbrial epithelial lining.  Corpus hemorrhagicum, endosalpingosis, and corpora albicantia of first ovary.  Multiple follicular cysts " endosalpingosis, and corpora albicantia of second ovary.  Uterine weight of 370 g   Electronically signed by Tim Zamora MD on 10/22/2024 at 1321        Assessment   Pt is 5 weeks s/p Da Stevan TLH with bilateral salpingo-oophorectomy   Having lots of hot flashes and night sweats now  Energy level still not back to baseline and still having pain when she does more activity     Plan   Starting Premarin .625 mg q day for surgical menopause  RTO in 3-4 weeks for post-op visit.    No orders of the defined types were placed in this encounter.         This note was electronically signed.    Kathy Marie MD  November 27, 2024

## 2024-12-04 DIAGNOSIS — G89.18 POST-OP PAIN: Primary | ICD-10-CM

## 2024-12-05 ENCOUNTER — TELEPHONE (OUTPATIENT)
Dept: OBSTETRICS AND GYNECOLOGY | Age: 44
End: 2024-12-05
Payer: COMMERCIAL

## 2024-12-05 DIAGNOSIS — G89.18 POST-OP PAIN: Primary | ICD-10-CM

## 2024-12-05 NOTE — TELEPHONE ENCOUNTER
CT/radiology dept called and requesting that order for ct abd/pelvis be changed to stat so pt can be scheduled for sooner appt available tomorrow.

## 2024-12-06 ENCOUNTER — HOSPITAL ENCOUNTER (OUTPATIENT)
Dept: CT IMAGING | Facility: HOSPITAL | Age: 44
Discharge: HOME | End: 2024-12-06
Payer: COMMERCIAL

## 2024-12-06 ENCOUNTER — OFFICE VISIT (OUTPATIENT)
Dept: BARIATRICS/WEIGHT MGMT | Facility: CLINIC | Age: 44
End: 2024-12-06
Payer: COMMERCIAL

## 2024-12-06 ENCOUNTER — LAB (OUTPATIENT)
Dept: LAB | Facility: HOSPITAL | Age: 44
End: 2024-12-06
Payer: COMMERCIAL

## 2024-12-06 VITALS
BODY MASS INDEX: 22.07 KG/M2 | DIASTOLIC BLOOD PRESSURE: 78 MMHG | TEMPERATURE: 98 F | OXYGEN SATURATION: 99 % | HEIGHT: 62 IN | SYSTOLIC BLOOD PRESSURE: 154 MMHG | WEIGHT: 119.9 LBS | HEART RATE: 94 BPM

## 2024-12-06 DIAGNOSIS — Z98.890 S/P LAPAROSCOPY WITH LYSIS OF ADHESIONS: ICD-10-CM

## 2024-12-06 DIAGNOSIS — E89.40 SURGICAL MENOPAUSE: Primary | ICD-10-CM

## 2024-12-06 DIAGNOSIS — K65.1 POSTOPERATIVE INTRA-ABDOMINAL ABSCESS: Primary | ICD-10-CM

## 2024-12-06 DIAGNOSIS — T81.43XA POSTOPERATIVE INTRA-ABDOMINAL ABSCESS: Primary | ICD-10-CM

## 2024-12-06 LAB
ALBUMIN SERPL-MCNC: 4.2 G/DL (ref 3.5–5.2)
ALBUMIN/GLOB SERPL: 1.3 G/DL
ALP SERPL-CCNC: 62 U/L (ref 39–117)
ALT SERPL W P-5'-P-CCNC: 12 U/L (ref 1–33)
ANION GAP SERPL CALCULATED.3IONS-SCNC: 13 MMOL/L (ref 5–15)
AST SERPL-CCNC: 16 U/L (ref 1–32)
BASOPHILS # BLD AUTO: 0.06 10*3/MM3 (ref 0–0.2)
BASOPHILS NFR BLD AUTO: 0.8 % (ref 0–1.5)
BILIRUB SERPL-MCNC: 0.4 MG/DL (ref 0–1.2)
BUN SERPL-MCNC: 9 MG/DL (ref 6–20)
BUN/CREAT SERPL: 14.3 (ref 7–25)
CALCIUM SPEC-SCNC: 8.9 MG/DL (ref 8.6–10.5)
CHLORIDE SERPL-SCNC: 99 MMOL/L (ref 98–107)
CO2 SERPL-SCNC: 24 MMOL/L (ref 22–29)
CREAT SERPL-MCNC: 0.63 MG/DL (ref 0.57–1)
DEPRECATED RDW RBC AUTO: 51.7 FL (ref 37–54)
EGFRCR SERPLBLD CKD-EPI 2021: 111 ML/MIN/1.73
EOSINOPHIL # BLD AUTO: 0.11 10*3/MM3 (ref 0–0.4)
EOSINOPHIL NFR BLD AUTO: 1.5 % (ref 0.3–6.2)
ERYTHROCYTE [DISTWIDTH] IN BLOOD BY AUTOMATED COUNT: 18.7 % (ref 12.3–15.4)
GLOBULIN UR ELPH-MCNC: 3.2 GM/DL
GLUCOSE SERPL-MCNC: 90 MG/DL (ref 65–99)
HCT VFR BLD AUTO: 36.9 % (ref 34–46.6)
HGB BLD-MCNC: 10.8 G/DL (ref 12–15.9)
IMM GRANULOCYTES # BLD AUTO: 0.02 10*3/MM3 (ref 0–0.05)
IMM GRANULOCYTES NFR BLD AUTO: 0.3 % (ref 0–0.5)
IRON 24H UR-MRATE: 48 MCG/DL (ref 37–145)
LYMPHOCYTES # BLD AUTO: 3.25 10*3/MM3 (ref 0.7–3.1)
LYMPHOCYTES NFR BLD AUTO: 43.2 % (ref 19.6–45.3)
MAGNESIUM SERPL-MCNC: 1.8 MG/DL (ref 1.6–2.6)
MCH RBC QN AUTO: 22.3 PG (ref 26.6–33)
MCHC RBC AUTO-ENTMCNC: 29.3 G/DL (ref 31.5–35.7)
MCV RBC AUTO: 76.2 FL (ref 79–97)
MONOCYTES # BLD AUTO: 0.43 10*3/MM3 (ref 0.1–0.9)
MONOCYTES NFR BLD AUTO: 5.7 % (ref 5–12)
NEUTROPHILS NFR BLD AUTO: 3.65 10*3/MM3 (ref 1.7–7)
NEUTROPHILS NFR BLD AUTO: 48.5 % (ref 42.7–76)
NRBC BLD AUTO-RTO: 0 /100 WBC (ref 0–0.2)
PHOSPHATE SERPL-MCNC: 4.4 MG/DL (ref 2.5–4.5)
PLATELET # BLD AUTO: 356 10*3/MM3 (ref 140–450)
PMV BLD AUTO: 11.1 FL (ref 6–12)
POTASSIUM SERPL-SCNC: 3.3 MMOL/L (ref 3.5–5.2)
PROT SERPL-MCNC: 7.4 G/DL (ref 6–8.5)
RBC # BLD AUTO: 4.84 10*6/MM3 (ref 3.77–5.28)
SODIUM SERPL-SCNC: 136 MMOL/L (ref 136–145)
WBC NRBC COR # BLD AUTO: 7.52 10*3/MM3 (ref 3.4–10.8)

## 2024-12-06 PROCEDURE — 84100 ASSAY OF PHOSPHORUS: CPT | Performed by: SURGERY

## 2024-12-06 PROCEDURE — 85025 COMPLETE CBC W/AUTO DIFF WBC: CPT | Performed by: SURGERY

## 2024-12-06 PROCEDURE — 74177 CT ABD & PELVIS W/CONTRAST: CPT

## 2024-12-06 PROCEDURE — 25510000001 IOPAMIDOL 61 % SOLUTION: Performed by: OBSTETRICS & GYNECOLOGY

## 2024-12-06 PROCEDURE — 36415 COLL VENOUS BLD VENIPUNCTURE: CPT | Performed by: SURGERY

## 2024-12-06 PROCEDURE — 83540 ASSAY OF IRON: CPT | Performed by: SURGERY

## 2024-12-06 PROCEDURE — 80053 COMPREHEN METABOLIC PANEL: CPT | Performed by: SURGERY

## 2024-12-06 PROCEDURE — 83735 ASSAY OF MAGNESIUM: CPT | Performed by: SURGERY

## 2024-12-06 RX ORDER — ESTRADIOL 1 MG/1
1 TABLET ORAL DAILY
Qty: 30 TABLET | Refills: 11 | Status: SHIPPED | OUTPATIENT
Start: 2024-12-06

## 2024-12-06 RX ORDER — IOPAMIDOL 612 MG/ML
100 INJECTION, SOLUTION INTRAVASCULAR
Status: COMPLETED | OUTPATIENT
Start: 2024-12-06 | End: 2024-12-06

## 2024-12-06 RX ADMIN — IOPAMIDOL 100 ML: 612 INJECTION, SOLUTION INTRAVENOUS at 13:44

## 2024-12-06 NOTE — PROGRESS NOTES
"  Patient Care Team:  Rashawn Mcgovern MD as PCP - General (Family Medicine)    Subjective     Aubree Alegria is a 44 y.o. female.     Aubree is post op 1 month from her vaginal cuff dehiscence repair and completion of the drainage from her abscess.  She is here today to be assessed due to complaints of pelvic discomfort and pain in the perineal region.  She stated the pain started approximately 2 weeks ago after she started working again.  She states the pain is somewhat less but more specifically exacerbated with the action of sitting down or the action of standing up.  NSAIDs do not appear to help the symptoms.    Review Of Systems:  General ROS: positive for  - fatigue  negative for - chills or fever  Respiratory ROS: no cough, shortness of breath, or wheezing  Cardiovascular ROS: no chest pain or dyspnea on exertion  Gastrointestinal ROS: no abdominal pain, change in bowel habits, or black or bloody stools    The following portions of the patient's history were reviewed and updated as appropriate: allergies, current medications, past family history, past medical history, past social history, past surgical history, and problem list.    Objective   /78 (BP Location: Left arm, Patient Position: Sitting, Cuff Size: Adult)   Pulse 94   Temp 98 °F (36.7 °C) (Temporal)   Ht 157.5 cm (62\")   Wt 54.4 kg (119 lb 14.4 oz)   LMP 10/07/2024 (Approximate) Comment: Neg HCG  SpO2 99%   BMI 21.93 kg/m²       12/06/24  1409   Weight: 54.4 kg (119 lb 14.4 oz)        General Appearance:  awake, alert, oriented, in no acute distress  CT Abdomen Pelvis With Contrast    Result Date: 12/6/2024  1. Dramatic improvement in the inflammatory changes of the pelvis since the 11/6/2024 study. Resolution of previous pelvic abscess collections. Only a tiny 6 mm low-density focus of the right vaginal cuff is identified with mild residual stranding of the fat adjacent to the vaginal cuff as well as along the inferior perineum " with no drainable abscess collection. Reactive pelvic lymph nodes. 2. Large volume of stool within the right colon and moderate stool the transverse colon for which constipation considered. No evidence for bowel obstruction. No free air.  This report was signed and finalized on 12/6/2024 2:12 PM by Dr. Sil Stuart MD.        WBC   Date Value Ref Range Status   12/06/2024 7.52 3.40 - 10.80 10*3/mm3 Final   11/02/2023 4.98 3.40 - 10.80 10*3/mm3 Final     RBC   Date Value Ref Range Status   12/06/2024 4.84 3.77 - 5.28 10*6/mm3 Final   11/02/2023 4.94 3.77 - 5.28 10*6/mm3 Final     Hemoglobin   Date Value Ref Range Status   12/06/2024 10.8 (L) 12.0 - 15.9 g/dL Final     Hematocrit   Date Value Ref Range Status   12/06/2024 36.9 34.0 - 46.6 % Final     MCV   Date Value Ref Range Status   12/06/2024 76.2 (L) 79.0 - 97.0 fL Final     MCH   Date Value Ref Range Status   12/06/2024 22.3 (L) 26.6 - 33.0 pg Final     MCHC   Date Value Ref Range Status   12/06/2024 29.3 (L) 31.5 - 35.7 g/dL Final     RDW   Date Value Ref Range Status   12/06/2024 18.7 (H) 12.3 - 15.4 % Final     RDW-SD   Date Value Ref Range Status   12/06/2024 51.7 37.0 - 54.0 fl Final     MPV   Date Value Ref Range Status   12/06/2024 11.1 6.0 - 12.0 fL Final     Platelets   Date Value Ref Range Status   12/06/2024 356 140 - 450 10*3/mm3 Final     Neutrophil %   Date Value Ref Range Status   12/06/2024 48.5 42.7 - 76.0 % Final     Lymphocyte %   Date Value Ref Range Status   12/06/2024 43.2 19.6 - 45.3 % Final     Monocyte %   Date Value Ref Range Status   12/06/2024 5.7 5.0 - 12.0 % Final     Eosinophil %   Date Value Ref Range Status   12/06/2024 1.5 0.3 - 6.2 % Final     Basophil %   Date Value Ref Range Status   12/06/2024 0.8 0.0 - 1.5 % Final     Immature Grans %   Date Value Ref Range Status   12/06/2024 0.3 0.0 - 0.5 % Final     Neutrophils, Absolute   Date Value Ref Range Status   12/06/2024 3.65 1.70 - 7.00 10*3/mm3 Final     Lymphocytes,  Absolute   Date Value Ref Range Status   12/06/2024 3.25 (H) 0.70 - 3.10 10*3/mm3 Final     Monocytes, Absolute   Date Value Ref Range Status   12/06/2024 0.43 0.10 - 0.90 10*3/mm3 Final     Eosinophils, Absolute   Date Value Ref Range Status   12/06/2024 0.11 0.00 - 0.40 10*3/mm3 Final     Basophils, Absolute   Date Value Ref Range Status   12/06/2024 0.06 0.00 - 0.20 10*3/mm3 Final     Immature Grans, Absolute   Date Value Ref Range Status   12/06/2024 0.02 0.00 - 0.05 10*3/mm3 Final     nRBC   Date Value Ref Range Status   12/06/2024 0.0 0.0 - 0.2 /100 WBC Final      ASSESSMENT/ PLAN    Encounter Diagnoses   Name Primary?    Postoperative intra-abdominal abscess Yes    S/P laparoscopy with lysis of adhesions      A total of 20 minutes was spent reviewing patient's laboratory and imaging findings, discussion with her gynecologist as well as face-to-face with this patient and her  during this encounter.  Over half the time was spent on counseling and coordination of care.  Residual abscess has significantly decreased, mild pelvic inflammatory images seen on CT.  This could be attributed to her perineal complaints.  No active infection seen from her laboratory findings.  She will follow-up with her gynecologist as scheduled.  She was instructed to continue with mild activity and explained that it appears that her symptoms are related to muscle skeletal complaints as opposed to an inflammatory or infectious etiology.  Discussed findings with her gynecologist as well.  She may return to our office as needed.      12/06/24  14:22 CST  Patient Care Team:  Rashawn Mcgovern MD as PCP - General (Family Medicine)  Goran Rdz MD FACS

## 2024-12-09 ENCOUNTER — TELEPHONE (OUTPATIENT)
Dept: BARIATRICS/WEIGHT MGMT | Facility: CLINIC | Age: 44
End: 2024-12-09
Payer: COMMERCIAL

## 2024-12-09 NOTE — TELEPHONE ENCOUNTER
----- Message from Goran Rdz sent at 12/9/2024  9:01 AM CST -----  Good day Amanda, can you please call this patient and inform her about her low potassium.  Tell her I recommend a banana introducing her daily meals.  She should follow-up with her potassium with a primary care provider in 1 month's time.  I have already addressed her CBC findings but if she has any questions she is welcome to call thank you.

## 2024-12-09 NOTE — TELEPHONE ENCOUNTER
Patient called back and results along with recommendations. Understanding was voiced            ----- Message from Goran Rdz sent at 12/9/2024  9:01 AM CST -----  Good day Amanda, can you please call this patient and inform her about her low potassium.  Tell her I recommend a banana introducing her daily meals.  She should follow-up with her potassium with a primary care provider in 1 month's time.  I have already addressed her CBC findings but if she has any questions she is welcome to call thank you.

## 2024-12-18 ENCOUNTER — OFFICE VISIT (OUTPATIENT)
Dept: OBSTETRICS AND GYNECOLOGY | Age: 44
End: 2024-12-18
Payer: COMMERCIAL

## 2024-12-18 VITALS
BODY MASS INDEX: 21.9 KG/M2 | HEIGHT: 62 IN | DIASTOLIC BLOOD PRESSURE: 72 MMHG | WEIGHT: 119 LBS | SYSTOLIC BLOOD PRESSURE: 102 MMHG

## 2024-12-18 DIAGNOSIS — Z90.710 S/P LAPAROSCOPIC HYSTERECTOMY: ICD-10-CM

## 2024-12-18 DIAGNOSIS — N39.0 URINARY TRACT INFECTION WITHOUT HEMATURIA, SITE UNSPECIFIED: ICD-10-CM

## 2024-12-18 DIAGNOSIS — E89.40 SURGICAL MENOPAUSE: ICD-10-CM

## 2024-12-18 DIAGNOSIS — N89.8 VAGINAL DISCHARGE: Primary | ICD-10-CM

## 2024-12-18 DIAGNOSIS — Z12.31 BREAST CANCER SCREENING BY MAMMOGRAM: ICD-10-CM

## 2024-12-18 PROCEDURE — 87661 TRICHOMONAS VAGINALIS AMPLIF: CPT | Performed by: OBSTETRICS & GYNECOLOGY

## 2024-12-18 RX ORDER — ESTRADIOL 1 MG/1
1 TABLET ORAL DAILY
Qty: 90 TABLET | Refills: 3 | Status: SHIPPED | OUTPATIENT
Start: 2024-12-18

## 2024-12-18 NOTE — PROGRESS NOTES
"Subjective   Chief Complaint   Patient presents with    Post-op     Pt here today for 6 week post op vaginal dehiscence surgery. Pt voices feeling ok but her tailbone is still causing pain.      Aubree Alegria is a 44 y.o. year old  presenting to be seen for her post-operative visit.  She had a Da Stevan TLH with bilateral salpingo-oophorectomy  6 weeks ago, followed by return to OR 3 days later for abdominal wash-out with gyn and general surgery.  Patient then required another return to OR for abscess at vaginal one week after that.  Patient was discharged home on oral antibiotics.  Currently she reports the she is feeling progressively better, but not yet back to baseline.  Patient has returned to work, but is exhausted and hurting by the afternoon.  She feels like abdomen is still very bloated sometimes.  Aubree is still occasionally requiring percocet, especially in the evening.     No Additional Complaints Reported    The following portions of the patient's history were reviewed and updated as appropriate:current medications and allergies    Review of Systems      Objective   /72   Ht 157.5 cm (62\")   Wt 54 kg (119 lb)   LMP 10/07/2024 (Approximate) Comment: Neg HCG  BMI 21.77 kg/m²     General:  well developed; well nourished  no acute distress  mentation appropriate   Abdomen: soft, non-tender; no masses  incisions are healed   Pelvis:      Final Diagnosis   Uterus, cervix, bilateral fallopian tubes and ovaries, hysterectomy and bilateral salpingo-oophorectomy:  Chronic cervicitis with nabothian cysts.  Secretory endometrium.  Diffuse adenomyosis.  Multiple myometrial leiomyomas (0.5 to 7 cm).  Unremarkable left and right fallopian tubes and fimbrial epithelial lining.  Corpus hemorrhagicum, endosalpingosis, and corpora albicantia of first ovary.  Multiple follicular cysts endosalpingosis, and corpora albicantia of second ovary.  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    No orders of the defined types were placed in this encounter.         This note was electronically signed.    Kathy Marie MD  December 18, 2024

## 2024-12-19 LAB — TRICHOMONAS VAGINALIS PCR: NOT DETECTED

## 2024-12-20 LAB
APPEARANCE UR: ABNORMAL
BACTERIA #/AREA URNS HPF: ABNORMAL /HPF
BACTERIA UR CULT: NORMAL
BACTERIA UR CULT: NORMAL
BILIRUB UR QL STRIP: NEGATIVE
CASTS URNS MICRO: ABNORMAL
COLOR UR: YELLOW
EPI CELLS #/AREA URNS HPF: ABNORMAL /HPF
GLUCOSE UR QL STRIP: NEGATIVE
HGB UR QL STRIP: NEGATIVE
KETONES UR QL STRIP: NEGATIVE
LEUKOCYTE ESTERASE UR QL STRIP: ABNORMAL
NITRITE UR QL STRIP: NEGATIVE
PH UR STRIP: 5.5 [PH] (ref 5–8)
PROT UR QL STRIP: NEGATIVE
RBC #/AREA URNS HPF: ABNORMAL /HPF
SP GR UR STRIP: 1.02 (ref 1–1.03)
UROBILINOGEN UR STRIP-MCNC: ABNORMAL MG/DL
WBC #/AREA URNS HPF: ABNORMAL /HPF

## 2024-12-24 LAB
LAB AP CASE REPORT: NORMAL
Lab: NORMAL
PATH INTERP SPEC-IMP: NORMAL

## 2025-01-12 LAB
NCCN CRITERIA FLAG: NORMAL
TYRER CUZICK SCORE: 8.1

## 2025-01-13 ENCOUNTER — HOSPITAL ENCOUNTER (OUTPATIENT)
Dept: MAMMOGRAPHY | Facility: HOSPITAL | Age: 45
Discharge: HOME | End: 2025-01-13
Admitting: OBSTETRICS & GYNECOLOGY
Payer: COMMERCIAL

## 2025-01-13 DIAGNOSIS — Z12.31 BREAST CANCER SCREENING BY MAMMOGRAM: ICD-10-CM

## 2025-01-13 PROCEDURE — 77063 BREAST TOMOSYNTHESIS BI: CPT

## 2025-01-13 PROCEDURE — 77067 SCR MAMMO BI INCL CAD: CPT

## 2025-01-20 ENCOUNTER — APPOINTMENT (OUTPATIENT)
Dept: CT IMAGING | Facility: HOSPITAL | Age: 45
End: 2025-01-20
Payer: COMMERCIAL

## 2025-01-20 ENCOUNTER — HOSPITAL ENCOUNTER (EMERGENCY)
Facility: HOSPITAL | Age: 45
Discharge: HOME | End: 2025-01-20
Attending: EMERGENCY MEDICINE | Admitting: EMERGENCY MEDICINE
Payer: COMMERCIAL

## 2025-01-20 VITALS
OXYGEN SATURATION: 93 % | TEMPERATURE: 99.1 F | DIASTOLIC BLOOD PRESSURE: 70 MMHG | BODY MASS INDEX: 23.28 KG/M2 | HEIGHT: 62 IN | RESPIRATION RATE: 18 BRPM | SYSTOLIC BLOOD PRESSURE: 123 MMHG | HEART RATE: 91 BPM | WEIGHT: 126.5 LBS

## 2025-01-20 DIAGNOSIS — M54.50 ACUTE LOW BACK PAIN WITHOUT SCIATICA, UNSPECIFIED BACK PAIN LATERALITY: Primary | ICD-10-CM

## 2025-01-20 DIAGNOSIS — N39.0 ACUTE UTI: ICD-10-CM

## 2025-01-20 LAB
ALBUMIN SERPL-MCNC: 4.2 G/DL (ref 3.5–5.2)
ALBUMIN/GLOB SERPL: 1.4 G/DL
ALP SERPL-CCNC: 64 U/L (ref 39–117)
ALT SERPL W P-5'-P-CCNC: 14 U/L (ref 1–33)
ANION GAP SERPL CALCULATED.3IONS-SCNC: 13 MMOL/L (ref 5–15)
AST SERPL-CCNC: 18 U/L (ref 1–32)
BACTERIA UR QL AUTO: ABNORMAL /HPF
BASOPHILS # BLD AUTO: 0.03 10*3/MM3 (ref 0–0.2)
BASOPHILS NFR BLD AUTO: 0.9 % (ref 0–1.5)
BILIRUB SERPL-MCNC: 0.4 MG/DL (ref 0–1.2)
BILIRUB UR QL STRIP: NEGATIVE
BUN SERPL-MCNC: 12 MG/DL (ref 6–20)
BUN/CREAT SERPL: 15.2 (ref 7–25)
CALCIUM SPEC-SCNC: 8.5 MG/DL (ref 8.6–10.5)
CHLORIDE SERPL-SCNC: 105 MMOL/L (ref 98–107)
CLARITY UR: ABNORMAL
CO2 SERPL-SCNC: 21 MMOL/L (ref 22–29)
COLOR UR: YELLOW
CREAT SERPL-MCNC: 0.79 MG/DL (ref 0.57–1)
D-LACTATE SERPL-SCNC: 2 MMOL/L (ref 0.5–2)
DEPRECATED RDW RBC AUTO: 49.1 FL (ref 37–54)
EGFRCR SERPLBLD CKD-EPI 2021: 93.6 ML/MIN/1.73
EOSINOPHIL # BLD AUTO: 0.04 10*3/MM3 (ref 0–0.4)
EOSINOPHIL NFR BLD AUTO: 1.2 % (ref 0.3–6.2)
ERYTHROCYTE [DISTWIDTH] IN BLOOD BY AUTOMATED COUNT: 18.4 % (ref 12.3–15.4)
FLUAV RNA RESP QL NAA+PROBE: NOT DETECTED
FLUBV RNA RESP QL NAA+PROBE: NOT DETECTED
GLOBULIN UR ELPH-MCNC: 3.1 GM/DL
GLUCOSE SERPL-MCNC: 109 MG/DL (ref 65–99)
GLUCOSE UR STRIP-MCNC: NEGATIVE MG/DL
HCT VFR BLD AUTO: 36.2 % (ref 34–46.6)
HGB BLD-MCNC: 11.5 G/DL (ref 12–15.9)
HGB UR QL STRIP.AUTO: NEGATIVE
HYALINE CASTS UR QL AUTO: ABNORMAL /LPF
IMM GRANULOCYTES # BLD AUTO: 0.01 10*3/MM3 (ref 0–0.05)
IMM GRANULOCYTES NFR BLD AUTO: 0.3 % (ref 0–0.5)
KETONES UR QL STRIP: NEGATIVE
LEUKOCYTE ESTERASE UR QL STRIP.AUTO: ABNORMAL
LIPASE SERPL-CCNC: 35 U/L (ref 13–60)
LYMPHOCYTES # BLD AUTO: 0.67 10*3/MM3 (ref 0.7–3.1)
LYMPHOCYTES NFR BLD AUTO: 20 % (ref 19.6–45.3)
MAGNESIUM SERPL-MCNC: 1.9 MG/DL (ref 1.6–2.6)
MCH RBC QN AUTO: 23.7 PG (ref 26.6–33)
MCHC RBC AUTO-ENTMCNC: 31.8 G/DL (ref 31.5–35.7)
MCV RBC AUTO: 74.5 FL (ref 79–97)
MONOCYTES # BLD AUTO: 0.39 10*3/MM3 (ref 0.1–0.9)
MONOCYTES NFR BLD AUTO: 11.6 % (ref 5–12)
NEUTROPHILS NFR BLD AUTO: 2.21 10*3/MM3 (ref 1.7–7)
NEUTROPHILS NFR BLD AUTO: 66 % (ref 42.7–76)
NITRITE UR QL STRIP: NEGATIVE
PH UR STRIP.AUTO: 7.5 [PH] (ref 5–8)
PLATELET # BLD AUTO: 280 10*3/MM3 (ref 140–450)
PMV BLD AUTO: 11 FL (ref 6–12)
POTASSIUM SERPL-SCNC: 3.8 MMOL/L (ref 3.5–5.2)
PROT SERPL-MCNC: 7.3 G/DL (ref 6–8.5)
PROT UR QL STRIP: NEGATIVE
RBC # BLD AUTO: 4.86 10*6/MM3 (ref 3.77–5.28)
RBC # UR STRIP: ABNORMAL /HPF
REF LAB TEST METHOD: ABNORMAL
SARS-COV-2 RNA RESP QL NAA+PROBE: NOT DETECTED
SODIUM SERPL-SCNC: 139 MMOL/L (ref 136–145)
SP GR UR STRIP: 1.02 (ref 1–1.03)
SQUAMOUS #/AREA URNS HPF: ABNORMAL /HPF
UROBILINOGEN UR QL STRIP: ABNORMAL
WBC # UR STRIP: ABNORMAL /HPF
WBC NRBC COR # BLD AUTO: 3.35 10*3/MM3 (ref 3.4–10.8)

## 2025-01-20 PROCEDURE — 83735 ASSAY OF MAGNESIUM: CPT | Performed by: EMERGENCY MEDICINE

## 2025-01-20 PROCEDURE — 87636 SARSCOV2 & INF A&B AMP PRB: CPT | Performed by: EMERGENCY MEDICINE

## 2025-01-20 PROCEDURE — 25810000003 SODIUM CHLORIDE 0.9 % SOLUTION: Performed by: EMERGENCY MEDICINE

## 2025-01-20 PROCEDURE — 80053 COMPREHEN METABOLIC PANEL: CPT | Performed by: EMERGENCY MEDICINE

## 2025-01-20 PROCEDURE — 81001 URINALYSIS AUTO W/SCOPE: CPT | Performed by: EMERGENCY MEDICINE

## 2025-01-20 PROCEDURE — 25010000002 MORPHINE PER 10 MG: Performed by: EMERGENCY MEDICINE

## 2025-01-20 PROCEDURE — 85025 COMPLETE CBC W/AUTO DIFF WBC: CPT | Performed by: EMERGENCY MEDICINE

## 2025-01-20 PROCEDURE — 25510000001 IOPAMIDOL 61 % SOLUTION: Performed by: EMERGENCY MEDICINE

## 2025-01-20 PROCEDURE — 83690 ASSAY OF LIPASE: CPT | Performed by: EMERGENCY MEDICINE

## 2025-01-20 PROCEDURE — 96375 TX/PRO/DX INJ NEW DRUG ADDON: CPT

## 2025-01-20 PROCEDURE — 99285 EMERGENCY DEPT VISIT HI MDM: CPT

## 2025-01-20 PROCEDURE — 83605 ASSAY OF LACTIC ACID: CPT | Performed by: EMERGENCY MEDICINE

## 2025-01-20 PROCEDURE — 25010000002 ONDANSETRON PER 1 MG: Performed by: EMERGENCY MEDICINE

## 2025-01-20 PROCEDURE — 96374 THER/PROPH/DIAG INJ IV PUSH: CPT

## 2025-01-20 PROCEDURE — 74177 CT ABD & PELVIS W/CONTRAST: CPT

## 2025-01-20 RX ORDER — METHOCARBAMOL 750 MG/1
750 TABLET, FILM COATED ORAL 3 TIMES DAILY PRN
Qty: 20 TABLET | Refills: 0 | Status: SHIPPED | OUTPATIENT
Start: 2025-01-20

## 2025-01-20 RX ORDER — ONDANSETRON 2 MG/ML
4 INJECTION INTRAMUSCULAR; INTRAVENOUS ONCE
Status: COMPLETED | OUTPATIENT
Start: 2025-01-20 | End: 2025-01-20

## 2025-01-20 RX ORDER — IOPAMIDOL 612 MG/ML
100 INJECTION, SOLUTION INTRAVASCULAR
Status: COMPLETED | OUTPATIENT
Start: 2025-01-20 | End: 2025-01-20

## 2025-01-20 RX ORDER — CEFDINIR 300 MG/1
300 CAPSULE ORAL 2 TIMES DAILY
Qty: 10 CAPSULE | Refills: 0 | Status: SHIPPED | OUTPATIENT
Start: 2025-01-20 | End: 2025-01-25

## 2025-01-20 RX ADMIN — ONDANSETRON 4 MG: 2 INJECTION INTRAMUSCULAR; INTRAVENOUS at 20:50

## 2025-01-20 RX ADMIN — MORPHINE SULFATE 4 MG: 4 INJECTION, SOLUTION INTRAMUSCULAR; INTRAVENOUS at 20:51

## 2025-01-20 RX ADMIN — IOPAMIDOL 100 ML: 612 INJECTION, SOLUTION INTRAVENOUS at 20:45

## 2025-01-20 RX ADMIN — SODIUM CHLORIDE 1000 ML: 9 INJECTION, SOLUTION INTRAVENOUS at 20:34

## 2025-01-21 NOTE — ED PROVIDER NOTES
Subjective   History of Present Illness  Pt presents to the  with report of having lower back pain radiating to lower abdomen - onset yesterday.  Reports she had fever earlier today.  Has had some dysuria - however this has been ongoing since she had hysterectomy a few months ago - this was complicated by development of abscess and had to have revision.  No vomiting/diarrhea.  No cough/congestion.  No injuries.  No known sick contacts.         Review of Systems   Constitutional:  Positive for fatigue and fever.   HENT:  Negative for congestion.    Respiratory:  Negative for cough and shortness of breath.    Cardiovascular:  Negative for chest pain.   Gastrointestinal:  Positive for abdominal pain. Negative for diarrhea and vomiting.   Genitourinary:  Positive for dysuria and flank pain. Negative for hematuria.   Musculoskeletal:  Positive for back pain.   Skin:  Negative for rash.   All other systems reviewed and are negative.      Past Medical History:   Diagnosis Date    Spinal headache        Allergies   Allergen Reactions    Ketorolac Tromethamine Hives    Tramadol Hives       Past Surgical History:   Procedure Laterality Date    APPENDECTOMY      BREAST BIOPSY Bilateral     benign     SECTION      x2    DIAGNOSTIC LAPAROSCOPY N/A 10/24/2024    Procedure: DIAGNOSTIC LAPAROSCOPY WITH DAVINCI ROBOT;  Surgeon: Sami Villalobos MD;  Location: [facility] OR;  Service: Obstetrics/Gynecology;  Laterality: N/A;    DIAGNOSTIC LAPAROSCOPY N/A 10/24/2024    Procedure: DIAGNOSTIC LAPAROSCOPY WITH DAVINCI ROBOT;  Surgeon: Goran Rdz MD;  Location: [facility] OR;  Service: General;  Laterality: N/A;    ECTOPIC PREGNANCY      TOTAL LAPAROSCOPIC HYSTERECTOMY SALPINGO OOPHORECTOMY Bilateral 10/21/2024    Procedure: TOTAL LAPAROSCOPIC HYSTERECTOMY BILATERAL SALPINGOOPHORECTOMY WITH DAVINCI ROBOT;  Surgeon: Kathy Marie MD;  Location: [facility] OR;  Service: Robotics - DaVinci;  Laterality: Bilateral;    TUBAL ABDOMINAL  LIGATION      VAGINAL CUFF DEHISCENCE REPAIR N/A 11/6/2024    Procedure: VAGINAL CUFF DEHISCENCE REPAIR;  Surgeon: Kathy Marie MD;  Location: [facility] OR;  Service: Obstetrics/Gynecology;  Laterality: N/A;    WISDOM TOOTH EXTRACTION         Family History   Problem Relation Age of Onset    Kidney cancer Maternal Aunt     Hypertension Father     No Known Problems Mother     No Known Problems Sister     Leukemia Paternal Grandmother     Breast cancer Paternal Aunt 40    Ovarian cancer Neg Hx     Uterine cancer Neg Hx     Colon cancer Neg Hx     Melanoma Neg Hx     Prostate cancer Neg Hx        Social History     Socioeconomic History    Marital status:    Tobacco Use    Smoking status: Some Days     Types: Cigarettes     Passive exposure: Current    Smokeless tobacco: Never   Vaping Use    Vaping status: Never Used   Substance and Sexual Activity    Alcohol use: Never    Drug use: Never    Sexual activity: Yes     Partners: Male     Birth control/protection: Surgical           Objective   Physical Exam  Vitals and nursing note reviewed.   Constitutional:       General: She is not in acute distress.  HENT:      Head: Normocephalic and atraumatic.      Nose: Nose normal.      Mouth/Throat:      Mouth: Mucous membranes are moist.   Eyes:      Conjunctiva/sclera: Conjunctivae normal.   Cardiovascular:      Rate and Rhythm: Normal rate and regular rhythm.      Pulses: Normal pulses.      Heart sounds: Normal heart sounds.   Pulmonary:      Effort: Pulmonary effort is normal.      Breath sounds: Normal breath sounds.   Abdominal:      General: Abdomen is flat. Bowel sounds are normal.      Palpations: Abdomen is soft.   Musculoskeletal:         General: Tenderness present.      Comments: + TTP to R flank. No mass/swelling.     Skin:     General: Skin is warm and dry.      Capillary Refill: Capillary refill takes less than 2 seconds.   Neurological:      Mental Status: She is alert.         Procedures           ED  Course      Labs Reviewed   COMPREHENSIVE METABOLIC PANEL - Abnormal; Notable for the following components:       Result Value    Glucose 109 (*)     CO2 21.0 (*)     Calcium 8.5 (*)     All other components within normal limits    Narrative:     GFR Categories in Chronic Kidney Disease (CKD)      GFR Category          GFR (mL/min/1.73)    Interpretation  G1                     90 or greater         Normal or high (1)  G2                      60-89                Mild decrease (1)  G3a                   45-59                Mild to moderate decrease  G3b                   30-44                Moderate to severe decrease  G4                    15-29                Severe decrease  G5                    14 or less           Kidney failure          (1)In the absence of evidence of kidney disease, neither GFR category G1 or G2 fulfill the criteria for CKD.    eGFR calculation 2021 CKD-EPI creatinine equation, which does not include race as a factor   URINALYSIS W/ MICROSCOPIC IF INDICATED (NO CULTURE) - Abnormal; Notable for the following components:    Appearance, UA Cloudy (*)     Leuk Esterase, UA Small (1+) (*)     All other components within normal limits   CBC WITH AUTO DIFFERENTIAL - Abnormal; Notable for the following components:    WBC 3.35 (*)     Hemoglobin 11.5 (*)     MCV 74.5 (*)     MCH 23.7 (*)     RDW 18.4 (*)     Lymphocytes, Absolute 0.67 (*)     All other components within normal limits   URINALYSIS, MICROSCOPIC ONLY - Abnormal; Notable for the following components:    RBC, UA 6-10 (*)     WBC, UA 3-5 (*)     Bacteria, UA 1+ (*)     Squamous Epithelial Cells, UA 7-12 (*)     All other components within normal limits   COVID-19 AND FLU A/B, NP SWAB IN TRANSPORT MEDIA 1 HR TAT - Normal    Narrative:     Fact sheet for providers: https://www.fda.gov/media/152017/download    Fact sheet for patients: https://www.fda.gov/media/500747/download    Test performed by PCR.   LIPASE - Normal   LACTIC ACID,  PLASMA - Normal   MAGNESIUM - Normal   COVID PRE-OP / PRE-PROCEDURE SCREENING ORDER (NO ISOLATION)    Narrative:     The following orders were created for panel order COVID PRE-OP / PRE-PROCEDURE SCREENING ORDER (NO ISOLATION) - Swab, Nasal Cavity.  Procedure                               Abnormality         Status                     ---------                               -----------         ------                     COVID-19 and FLU A/B PCR...[[id number]]  Normal              Final result                 Please view results for these tests on the individual orders.   CBC AND DIFFERENTIAL    Narrative:     The following orders were created for panel order CBC & Differential.  Procedure                               Abnormality         Status                     ---------                               -----------         ------                     CBC Auto Differential[[id number]]        Abnormal            Final result                 Please view results for these tests on the individual orders.     CT Abdomen Pelvis With Contrast   Final Result   1. No acute abnormality is seen.       This report was signed and finalized on 1/20/2025 8:58 PM by Dr. Andre Brown MD.                                                             Medical Decision Making  Pt stable in EC - no evid of SBI/sepsis.  No evid of pyelo/AAA/stone.  No findings s/o GI pathology.  She has some mild bacteria in UA - will cover for UTI given her hx.  Will give robaxin as well.  Will d/c to home and recommend outpt f/u    Amount and/or Complexity of Data Reviewed  Labs: ordered.  Radiology: ordered.    Risk  Prescription drug management.        Final diagnoses:   Acute low back pain without sciatica, unspecified back pain laterality   Acute UTI       ED Disposition  ED Disposition       ED Disposition   Discharge    Condition   Stable    Comment   --               Rashawn Mcgovern MD  [address] [place] [address] [id number] [phone]                Medication List        New Prescriptions      cefdinir 300 MG capsule  Commonly known as: OMNICEF  Take 1 capsule by mouth 2 (Two) Times a Day for 5 days.     methocarbamol 750 MG tablet  Commonly known as: ROBAXIN  Take 1 tablet by mouth 3 (Three) Times a Day As Needed for Muscle Spasms.               Where to Get Your Medications        These medications were sent to [id number] - [place], [place] - [address] AT [address] - [phone] [facility] [phone]   [address] [place] [id number]      Phone: [phone]   cefdinir 300 MG capsule  methocarbamol 750 MG tablet            Aryan Srinivasan, DO  01/20/25 2023       Aryan Srinivasan, DO  01/20/25 2139

## 2025-03-06 ENCOUNTER — TELEPHONE (OUTPATIENT)
Dept: OBSTETRICS AND GYNECOLOGY | Age: 45
End: 2025-03-06
Payer: COMMERCIAL

## 2025-03-06 NOTE — TELEPHONE ENCOUNTER
Pt called reporting finding of prominent lump on left breast. Pt had mammogram performed that was normal in 12/18/24. Pt scheduled 3/7 with blake for follow up. Last OV 12/18/24.

## 2025-03-07 ENCOUNTER — OFFICE VISIT (OUTPATIENT)
Dept: OBSTETRICS AND GYNECOLOGY | Age: 45
End: 2025-03-07
Payer: COMMERCIAL

## 2025-03-07 VITALS
HEIGHT: 62 IN | SYSTOLIC BLOOD PRESSURE: 142 MMHG | BODY MASS INDEX: 23.19 KG/M2 | WEIGHT: 126 LBS | DIASTOLIC BLOOD PRESSURE: 80 MMHG

## 2025-03-07 DIAGNOSIS — N63.20 MASS OF LEFT BREAST, UNSPECIFIED QUADRANT: Primary | ICD-10-CM

## 2025-03-07 NOTE — PROGRESS NOTES
"Chief Complaint   Patient presents with    Breast Mass     Patient is here for evaluation of a prominent lump she noted on left breast x 1 week. Denies pain or redness/streaking at lump site. Screening mammogram 1/13/25, BIRADS Cat 2 benign. On Estradiol tablet HRT due to hysterectomy and oophorectomy.       History:  Aubree Alegria is a 45 y.o. female who presents today for follow-up for evaluation of the above:    HPI  Patient reports a breast mass/lump for 1 week. She has a history of a cyst in her left breast in a similar area but she states she has never been able to feel until this past week and she can also see it.           ROS:  Review of Systems   Constitutional: Negative.    HENT: Negative.     Eyes: Negative.    Respiratory: Negative.     Cardiovascular: Negative.    Gastrointestinal: Negative.    Endocrine: Negative.    Genitourinary: Negative.  Positive for breast lump.   Musculoskeletal: Negative.    Skin: Negative.    Neurological: Negative.    Psychiatric/Behavioral: Negative.         Ms. Alegria  reports that she quit smoking about 10 years ago. Her smoking use included cigarettes. She started smoking about 4 months ago. She has a 0.1 pack-year smoking history. She has been exposed to tobacco smoke. She has never used smokeless tobacco. She reports that she does not drink alcohol and does not use drugs.      Current Outpatient Medications:     estradiol (Estrace) 1 MG tablet, Take 1 tablet by mouth Daily., Disp: 90 tablet, Rfl: 3    ibuprofen (ADVIL,MOTRIN) 200 MG tablet, Take 1 tablet by mouth Every 6 (Six) Hours As Needed for Mild Pain., Disp: , Rfl:     methocarbamol (ROBAXIN) 750 MG tablet, Take 1 tablet by mouth 3 (Three) Times a Day As Needed for Muscle Spasms., Disp: 20 tablet, Rfl: 0      OBJECTIVE:  /80   Ht 157.5 cm (62\")   Wt 57.2 kg (126 lb)   LMP 10/07/2024 (Approximate) Comment: Neg HCG  BMI 23.05 kg/m²    Physical Exam  Exam conducted with a chaperone present.   Chest: "   Breasts:     Left: Mass present.             Assessment/Plan    Diagnoses and all orders for this visit:    1. Mass of left breast, unspecified quadrant (Primary)  -     US Breast Left Limited; Future         An After Visit Summary was printed and given to the patient at discharge.  Return if symptoms worsen or fail to improve, for Next scheduled follow up. Sooner if problems arise.          Alanis Hernandez APRN. 3/7/2025   Electronically Signed

## 2025-03-10 DIAGNOSIS — N63.20 MASS OF LEFT BREAST, UNSPECIFIED QUADRANT: Primary | ICD-10-CM

## 2025-03-12 ENCOUNTER — HOSPITAL ENCOUNTER (OUTPATIENT)
Dept: ULTRASOUND IMAGING | Facility: HOSPITAL | Age: 45
Discharge: HOME | End: 2025-03-12
Payer: COMMERCIAL

## 2025-03-12 ENCOUNTER — HOSPITAL ENCOUNTER (OUTPATIENT)
Dept: MAMMOGRAPHY | Facility: HOSPITAL | Age: 45
Discharge: HOME | End: 2025-03-12
Payer: COMMERCIAL

## 2025-03-12 DIAGNOSIS — N63.20 MASS OF LEFT BREAST, UNSPECIFIED QUADRANT: ICD-10-CM

## 2025-03-12 PROCEDURE — 76642 ULTRASOUND BREAST LIMITED: CPT

## 2025-03-12 PROCEDURE — G0279 TOMOSYNTHESIS, MAMMO: HCPCS

## 2025-03-12 PROCEDURE — 77065 DX MAMMO INCL CAD UNI: CPT

## 2025-03-13 ENCOUNTER — TELEPHONE (OUTPATIENT)
Dept: SURGERY | Facility: CLINIC | Age: 45
End: 2025-03-13
Payer: COMMERCIAL

## 2025-03-13 NOTE — TELEPHONE ENCOUNTER
Attempted to contact PT regarding scheduling for their referral. I left them a voicemail with our office number and requested they call us back at their earliest convenience to get scheduled.    CBC  03/13

## 2025-03-24 ENCOUNTER — OFFICE VISIT (OUTPATIENT)
Dept: SURGERY | Facility: CLINIC | Age: 45
End: 2025-03-24
Payer: COMMERCIAL

## 2025-03-24 ENCOUNTER — PATIENT ROUNDING (BHMG ONLY) (OUTPATIENT)
Dept: SURGERY | Facility: CLINIC | Age: 45
End: 2025-03-24
Payer: COMMERCIAL

## 2025-03-24 VITALS
WEIGHT: 126 LBS | BODY MASS INDEX: 23.19 KG/M2 | DIASTOLIC BLOOD PRESSURE: 74 MMHG | HEIGHT: 62 IN | SYSTOLIC BLOOD PRESSURE: 116 MMHG

## 2025-03-24 DIAGNOSIS — N60.02 CYST OF LEFT BREAST: Primary | ICD-10-CM

## 2025-03-24 PROCEDURE — 99214 OFFICE O/P EST MOD 30 MIN: CPT

## 2025-03-24 RX ORDER — LIDOCAINE HYDROCHLORIDE AND EPINEPHRINE 10; 10 MG/ML; UG/ML
10 INJECTION, SOLUTION INFILTRATION; PERINEURAL ONCE
OUTPATIENT
Start: 2025-03-24 | End: 2025-03-24

## 2025-03-24 NOTE — PROGRESS NOTES
Office New Patient History and Physical:     Referring Provider: Alanis Hernandez APRN    Chief Complaint   Patient presents with    Abnormal Breast Imaging       Subjective     History of Present Illness  The patient is a 45-year-old female who presents for evaluation of a breast cyst in her left breast.    She reports experiencing pain associated with the cyst, which has shown some reduction in size. She hypothesizes that her estrogen therapy may be influencing the cyst's behavior, leading her to temporarily discontinue the medication. The cyst was not palpable initially but has since become prominent, protruding from her breast. She describes intermittent severe stabbing pain in the nipple area, which she perceives as more intense than the pain elsewhere. Additionally, she experiences a pulsating sensation in the area. She underwent a total hysterectomy, including oophorectomy, and initiated estrogen therapy in 2025 following a mammogram. She resumed the estrogen therapy due to sleep disturbances.    FAMILY HISTORY  Her aunt had breast cancer years ago.    MEDICATIONS  Current: estrogen, no blood thinners        Review of Systems    Review of Systems - General ROS: negative  ENT ROS: negative  Respiratory ROS: no cough, shortness of breath, or wheezing  Cardiovascular ROS: no chest pain or dyspnea on exertion  Gastrointestinal ROS: no abdominal pain, change in bowel habits, or black or bloody stools  Genito-Urinary ROS: no dysuria, trouble voiding, or hematuria  Dermatological ROS: negative   Breast ROS: positive for - L breast cyst   Hematological and Lymphatic ROS: negative  Musculoskeletal ROS: negative   Neurological ROS: no TIA or stroke symptoms    Psychological ROS: negative  Endocrine ROS: negative    History  Past Medical History:   Diagnosis Date    Spinal headache    ,   Past Surgical History:   Procedure Laterality Date    APPENDECTOMY      BREAST BIOPSY Bilateral     benign     SECTION       x2    DIAGNOSTIC LAPAROSCOPY N/A 10/24/2024    Procedure: DIAGNOSTIC LAPAROSCOPY WITH DAVINCI ROBOT;  Surgeon: Sami Villalobos MD;  Location:  [facility];  Service: Obstetrics/Gynecology;  Laterality: N/A;    DIAGNOSTIC LAPAROSCOPY N/A 10/24/2024    Procedure: DIAGNOSTIC LAPAROSCOPY WITH DAVINCI ROBOT;  Surgeon: Goran Rdz MD;  Location:  [facility];  Service: General;  Laterality: N/A;    ECTOPIC PREGNANCY      TOTAL LAPAROSCOPIC HYSTERECTOMY SALPINGO OOPHORECTOMY Bilateral 10/21/2024    Procedure: TOTAL LAPAROSCOPIC HYSTERECTOMY BILATERAL SALPINGOOPHORECTOMY WITH DAVINCI ROBOT;  Surgeon: Kathy Marie MD;  Location:  [facility];  Service: Robotics - DaVinci;  Laterality: Bilateral;    TUBAL ABDOMINAL LIGATION      VAGINAL CUFF DEHISCENCE REPAIR N/A 11/6/2024    Procedure: VAGINAL CUFF DEHISCENCE REPAIR;  Surgeon: Kathy Marie MD;  Location:  [facility];  Service: Obstetrics/Gynecology;  Laterality: N/A;    WISDOM TOOTH EXTRACTION     ,   Family History   Problem Relation Age of Onset    Kidney cancer Maternal Aunt     Hypertension Father     No Known Problems Mother     No Known Problems Sister     Leukemia Paternal Grandmother     Breast cancer Paternal Aunt 40    Ovarian cancer Neg Hx     Uterine cancer Neg Hx     Colon cancer Neg Hx     Melanoma Neg Hx     Prostate cancer Neg Hx    ,   Social History     Tobacco Use    Smoking status: Former     Current packs/day: 0.25     Average packs/day: 0.3 packs/day for 0.4 years (0.1 ttl pk-yrs)     Types: Cigarettes     Start date: 10/21/2024     Quit date: 2015     Passive exposure: Current    Smokeless tobacco: Never   Vaping Use    Vaping status: Never Used   Substance Use Topics    Alcohol use: Never    Drug use: Never   , (Not in a hospital admission)   and Allergies:  Ketorolac tromethamine and Tramadol    Current Outpatient Medications:     estradiol (Estrace) 1 MG tablet, Take 1 tablet by mouth Daily., Disp: 90 tablet, Rfl: 3    ibuprofen (ADVIL,MOTRIN) 200  "MG tablet, Take 1 tablet by mouth Every 6 (Six) Hours As Needed for Mild Pain., Disp: , Rfl:     methocarbamol (ROBAXIN) 750 MG tablet, Take 1 tablet by mouth 3 (Three) Times a Day As Needed for Muscle Spasms., Disp: 20 tablet, Rfl: 0    Objective     Vital Signs   /74   Ht 157.5 cm (62\")   Wt 57.2 kg (126 lb)   LMP 10/07/2024 (Approximate) Comment: Neg HCG  BMI 23.05 kg/m²      Physical Exam:  General appearance - alert, well appearing, and in no distress  Mental status - normal mood, behavior, speech, dress, motor activity, and thought processes  Eyes - sclera anicteric  Neck - supple, no significant adenopathy  Chest - no tachypnea, retractions or cyanosis  Heart - normal rate and regular rhythm  Breasts - right breast normal without mass, skin or nipple changes or axillary nodes, abnormal mass palpable in the left breast approximately 5 cm in size from 9-12 o'clock position coordinating with cyst seen on ultrasound, no skin changes or axillary adenopathy   Neurological - alert, oriented, normal speech, no focal findings or movement disorder noted  Skin - normal coloration and turgor, no rashes, no suspicious skin lesions noted    Physical Exam         Results Review:  Result Review :            US Breast Left Limited (03/12/2025 11:52)   Targeted LEFT breast ultrasound.  Grayscale and color flow imaging.  Palpable abnormality within the superior LEFT breast.  2 cm from the nipple.     Large simple cyst measuring 4.5 x 3.1 x 4.6 cm.     No solid mass or malignant features.     IMPRESSION:  1. Enlarging LEFT breast cyst.  2. Since this is enlarging and symptomatic ultrasound-guided aspiration  could easily be performed though cysts that have been drained do often  recur.    Mammo Diagnostic Digital Tomosynthesis Left With CAD (03/12/2025 11:38)   Oval 44 x 36 mm LEFT breast mass compared with 42 x 35 mm 2 months ago  and 31 x 22 mm 3 years ago.  LEFT breast ultrasound performed on 1/5/2022 showed a 3 x 2 " cm LEFT  breast simple cyst.     Given that this finding has recently enlarged repeat targeted ultrasound  shows a large simple cyst measuring 46 mm in greatest dimension.     Summary:  1. Benign LEFT breast finding (Enlarging cyst).  2. BI-RADS category 2.  3. Screening mammography is recommended in one year.     Comment:  Since this cyst is enlarging and symptomatic ultrasound-guided  aspiration could easily be performed though cysts which have been  drained do often recur.     Tyrer-Cuzick lifetime risk score of 8.1%.    NCCN guidelines for genetic testing have not been met.  American Cancer Society guidelines recommend screening MRI for women  with estimated lifetime risk of breast cancer greater than 20 percent.    Results  Imaging  Ultrasound shows a cyst in the left breast measuring 4.5 x 3.1 x 4.6 cm.       Assessment & Plan     Diagnoses and all orders for this visit:    1. Cyst of left breast (Primary)  -     Obtain Informed Consent; Standing  -     Cancel: US Guided Cyst Aspiration Breast Right; Future  -     Non-gynecologic Cytology; Standing  -     lidocaine 1% - EPINEPHrine 1:117345 (XYLOCAINE W/EPI) 1 %-1:981069 injection 10 mL  -     Obtain Informed Consent; Standing  -     US Guided Cyst Aspiration Breast Left; Future  -     Non-gynecologic Cytology; Standing  -     lidocaine 1% - EPINEPHrine 1:546793 (XYLOCAINE W/EPI) 1 %-1:269945 injection 10 mL         Assessment & Plan  1. Left breast cyst.  The cyst has been progressively enlarging and is currently of significant size, measuring 4.5 x 3.1 x 4.6 cm on ultrasound. It was noted to be 4 cm in size on her screening mammogram. The patient reports intermittent pain, including stabbing and pulsating sensations, likely due to nerve irritation from the cyst's size and location. The cyst could have been present for years and grown slowly over time or it could have grown during pregnancy and then increased in size due to hormones. The estrogen therapy she  is taking could be contributing to the cyst's growth and associated symptoms. The potential risks and benefits of estrogen therapy were discussed, including the possibility of fluid reaccumulation post-aspiration. The procedure for ultrasound-guided aspiration was explained in detail. She was advised to continue her estrogen therapy if it provides significant relief from other symptoms, with the understanding that the cyst can be aspirated as needed. An order for cyst aspiration will be placed, and she will be contacted to schedule the procedure. Tests will be conducted on the aspirated fluid to rule out any abnormalities. A follow-up appointment is recommended in 6 months to monitor the condition. If the cyst becomes bothersome before then, she can contact us for an earlier appointment.    Follow-up  The patient will follow up in 6 months.    PROCEDURE  The patient underwent a total hysterectomy with oophorectomy.     Follow up:     BMI is within normal parameters. No other follow-up for BMI required.    Return in about 6 months (around 9/24/2025) for 6 month breast f/u .      Delia James PA-C  03/24/25  16:04 CDT    Patient or patient representative verbalized consent for the use of Ambient Listening during the visit with  Delia James PA-C for chart documentation. 3/24/2025  15:03 CDT

## 2025-04-01 ENCOUNTER — HOSPITAL ENCOUNTER (OUTPATIENT)
Dept: ULTRASOUND IMAGING | Facility: HOSPITAL | Age: 45
Discharge: HOME | End: 2025-04-01
Payer: COMMERCIAL

## 2025-04-01 DIAGNOSIS — N60.02 CYST OF LEFT BREAST: ICD-10-CM

## 2025-04-01 PROCEDURE — 76942 ECHO GUIDE FOR BIOPSY: CPT

## 2025-04-01 RX ORDER — LIDOCAINE HYDROCHLORIDE 10 MG/ML
10 INJECTION, SOLUTION INFILTRATION; PERINEURAL ONCE
Status: DISPENSED | OUTPATIENT
Start: 2025-04-01

## 2025-04-02 ENCOUNTER — TELEPHONE (OUTPATIENT)
Dept: SURGERY | Facility: CLINIC | Age: 45
End: 2025-04-02
Payer: COMMERCIAL

## 2025-04-02 NOTE — TELEPHONE ENCOUNTER
Called the check on patient after cyst aspiration. Pt states she is doing fine just a little sore. No redness, bruising, or drainage from aspiration site.

## 2025-08-11 ENCOUNTER — TELEPHONE (OUTPATIENT)
Dept: SURGERY | Facility: CLINIC | Age: 45
End: 2025-08-11
Payer: COMMERCIAL

## 2025-08-12 ENCOUNTER — OFFICE VISIT (OUTPATIENT)
Age: 45
End: 2025-08-12
Payer: COMMERCIAL

## 2025-08-12 VITALS
OXYGEN SATURATION: 98 % | DIASTOLIC BLOOD PRESSURE: 78 MMHG | TEMPERATURE: 97.8 F | BODY MASS INDEX: 25.51 KG/M2 | SYSTOLIC BLOOD PRESSURE: 120 MMHG | RESPIRATION RATE: 18 BRPM | HEART RATE: 68 BPM | HEIGHT: 61 IN | WEIGHT: 135.1 LBS

## 2025-08-12 DIAGNOSIS — L05.91 CHRONIC RECURRENT PILONIDAL CYST WITHOUT ABSCESS: Chronic | ICD-10-CM

## 2025-08-12 DIAGNOSIS — G43.E09 CHRONIC MIGRAINE WITH AURA WITHOUT STATUS MIGRAINOSUS, NOT INTRACTABLE: Primary | Chronic | ICD-10-CM

## 2025-08-12 DIAGNOSIS — J02.9 SORE THROAT: ICD-10-CM

## 2025-08-12 RX ORDER — DOXYCYCLINE 100 MG/1
100 CAPSULE ORAL 2 TIMES DAILY
Qty: 20 CAPSULE | Refills: 0 | Status: SHIPPED | OUTPATIENT
Start: 2025-08-12 | End: 2025-08-13 | Stop reason: SDUPTHER

## 2025-08-12 RX ORDER — BUTALBITAL, ACETAMINOPHEN AND CAFFEINE 50; 325; 40 MG/1; MG/1; MG/1
1 TABLET ORAL EVERY 6 HOURS PRN
Qty: 120 TABLET | Refills: 0 | Status: SHIPPED | OUTPATIENT
Start: 2025-08-12 | End: 2025-08-13 | Stop reason: SDUPTHER

## 2025-08-13 ENCOUNTER — TELEPHONE (OUTPATIENT)
Age: 45
End: 2025-08-13
Payer: COMMERCIAL

## 2025-08-13 DIAGNOSIS — J02.9 SORE THROAT: ICD-10-CM

## 2025-08-13 DIAGNOSIS — G43.E09 CHRONIC MIGRAINE WITH AURA WITHOUT STATUS MIGRAINOSUS, NOT INTRACTABLE: Chronic | ICD-10-CM

## 2025-08-13 DIAGNOSIS — L05.91 CHRONIC RECURRENT PILONIDAL CYST WITHOUT ABSCESS: Chronic | ICD-10-CM

## 2025-08-13 RX ORDER — DOXYCYCLINE 100 MG/1
100 CAPSULE ORAL 2 TIMES DAILY
Qty: 20 CAPSULE | Refills: 0 | Status: SHIPPED | OUTPATIENT
Start: 2025-08-13 | End: 2025-08-23

## 2025-08-13 RX ORDER — BUTALBITAL, ACETAMINOPHEN AND CAFFEINE 50; 325; 40 MG/1; MG/1; MG/1
1 TABLET ORAL EVERY 6 HOURS PRN
Qty: 120 TABLET | Refills: 0 | Status: SHIPPED | OUTPATIENT
Start: 2025-08-13 | End: 2025-09-12

## (undated) DEVICE — ANTIBACTERIAL UNDYED BRAIDED (POLYGLACTIN 910), SYNTHETIC ABSORBABLE SUTURE: Brand: COATED VICRYL

## (undated) DEVICE — PK LAP GYN 30

## (undated) DEVICE — INTENDED TO AID IN THE PASSING OF SUTURES THROUGH BONE AND SOFT TISSUE DURING ORTHOPEDIC SURGERY: Brand: HOFFEE SUTURE RETRIEVER

## (undated) DEVICE — PK POSTN TRENGUARD450 PROC

## (undated) DEVICE — GLOVE,SURG,SENSICARE,ALOE,LF,PF,6: Brand: MEDLINE

## (undated) DEVICE — TROC BLADLES ANCHORPORT/OPTI LP 8X120MM 1P/U

## (undated) DEVICE — APPL HEMO ENDO SURGICEL 2IN1 1P/U STRL

## (undated) DEVICE — SPONGE,LAP,12"X12",XR,ST,5/PK,40PK/CS: Brand: MEDLINE

## (undated) DEVICE — GAUZE,SPONGE,2"X2",8PLY,STERILE,LF,2'S: Brand: MEDLINE

## (undated) DEVICE — PATIENT RETURN ELECTRODE, SINGLE-USE, CONTACT QUALITY MONITORING, ADULT, WITH 9FT CORD, FOR PATIENTS WEIGING OVER 33LBS. (15KG): Brand: MEGADYNE

## (undated) DEVICE — ENDOPATH XCEL WITH OPTIVIEW TECHNOLOGY BLADELESS TROCARS WITH STABILITY SLEEVES: Brand: ENDOPATH XCEL OPTIVIEW

## (undated) DEVICE — DAVINCI: Brand: MEDLINE INDUSTRIES, INC.

## (undated) DEVICE — PAD D&C: Brand: MEDLINE INDUSTRIES, INC.

## (undated) DEVICE — GLV SURG SENSICARE W/ALOE PF LF 7.5 STRL

## (undated) DEVICE — ARM DRAPE

## (undated) DEVICE — 2, DISPOSABLE SUCTION/IRRIGATOR WITHOUT DISPOSABLE TIP: Brand: STRYKEFLOW

## (undated) DEVICE — BLADELESS OBTURATOR: Brand: WECK VISTA

## (undated) DEVICE — TIP COVER ACCESSORY

## (undated) DEVICE — ANTIBACTERIAL VIOLET BRAIDED (POLYGLACTIN 910), SYNTHETIC ABSORBABLE SUTURE: Brand: COATED VICRYL

## (undated) DEVICE — TOTAL TRAY, 16FR 10ML SIL FOLEY, URN: Brand: MEDLINE

## (undated) DEVICE — RESERVOIR,SUCTION,100CC,SILICONE: Brand: MEDLINE

## (undated) DEVICE — SEAL

## (undated) DEVICE — ST TBG AIRSEAL FLTR TRI LUM

## (undated) DEVICE — ST TBG PNEUMOCLEAR EVAC SMOKE HIFLO

## (undated) DEVICE — NDL CNTR 40CT FM MAG: Brand: MEDLINE INDUSTRIES, INC.

## (undated) DEVICE — ADHS SKIN PREMIERPRO EXOFIN TOPICAL HI/VISC .5ML

## (undated) DEVICE — CLTH CLENS READYCLEANSE PERI CARE PK/5

## (undated) DEVICE — KT CLN CLEANOR SCPE

## (undated) DEVICE — DRN WND HUBLSS FLUT FULL PERF SIL10MM

## (undated) DEVICE — YANKAUER,BULB TIP WITH VENT: Brand: ARGYLE